# Patient Record
Sex: FEMALE | Race: WHITE
[De-identification: names, ages, dates, MRNs, and addresses within clinical notes are randomized per-mention and may not be internally consistent; named-entity substitution may affect disease eponyms.]

---

## 2017-03-18 ENCOUNTER — HOSPITAL ENCOUNTER (OUTPATIENT)
Dept: HOSPITAL 63 - ER | Age: 67
Setting detail: OBSERVATION
LOS: 3 days | Discharge: HOME | End: 2017-03-21
Attending: INTERNAL MEDICINE | Admitting: INTERNAL MEDICINE
Payer: COMMERCIAL

## 2017-03-18 VITALS — HEIGHT: 59 IN | WEIGHT: 168.31 LBS | BODY MASS INDEX: 33.93 KG/M2

## 2017-03-18 DIAGNOSIS — J44.0: ICD-10-CM

## 2017-03-18 DIAGNOSIS — Z82.49: ICD-10-CM

## 2017-03-18 DIAGNOSIS — J11.1: ICD-10-CM

## 2017-03-18 DIAGNOSIS — Z79.899: ICD-10-CM

## 2017-03-18 DIAGNOSIS — K21.9: ICD-10-CM

## 2017-03-18 DIAGNOSIS — E78.5: ICD-10-CM

## 2017-03-18 DIAGNOSIS — M15.9: ICD-10-CM

## 2017-03-18 DIAGNOSIS — J44.1: ICD-10-CM

## 2017-03-18 DIAGNOSIS — Z83.3: ICD-10-CM

## 2017-03-18 DIAGNOSIS — J45.909: ICD-10-CM

## 2017-03-18 DIAGNOSIS — I25.10: ICD-10-CM

## 2017-03-18 DIAGNOSIS — F41.9: ICD-10-CM

## 2017-03-18 DIAGNOSIS — I10: ICD-10-CM

## 2017-03-18 DIAGNOSIS — J09.X2: Primary | ICD-10-CM

## 2017-03-18 DIAGNOSIS — F17.210: ICD-10-CM

## 2017-03-18 LAB
ALBUMIN SERPL-MCNC: 3.8 G/DL (ref 3.4–5)
ALP SERPL-CCNC: 58 U/L (ref 46–116)
ALT SERPL-CCNC: 28 U/L (ref 14–59)
ANION GAP SERPL CALC-SCNC: 6 MMOL/L (ref 6–14)
AST SERPL-CCNC: 20 U/L (ref 15–37)
BASOPHILS # BLD AUTO: 0.1 X10^3/UL (ref 0–0.2)
BASOPHILS NFR BLD: 2 % (ref 0–3)
BILIRUB DIRECT SERPL-MCNC: 0.1 MG/DL (ref 0–0.2)
BILIRUB SERPL-MCNC: 0.3 MG/DL (ref 0.2–1)
CA-I SERPL ISE-MCNC: 12 MG/DL (ref 7–20)
CALCIUM SERPL-MCNC: 9 MG/DL (ref 8.5–10.1)
CHLORIDE SERPL-SCNC: 106 MMOL/L (ref 98–107)
CK SERPL-CCNC: 67 U/L (ref 26–192)
CO2 SERPL-SCNC: 29 MMOL/L (ref 21–32)
CREAT SERPL-MCNC: 0.9 MG/DL (ref 0.6–1)
EOSINOPHIL NFR BLD: 0.7 X10^3/UL (ref 0–0.7)
EOSINOPHIL NFR BLD: 12 % (ref 0–3)
ERYTHROCYTE [DISTWIDTH] IN BLOOD BY AUTOMATED COUNT: 15 % (ref 11.5–14.5)
GFR SERPLBLD BASED ON 1.73 SQ M-ARVRAT: 63 ML/MIN
GLUCOSE SERPL-MCNC: 91 MG/DL (ref 70–99)
HCT VFR BLD CALC: 43.6 % (ref 36–47)
HGB BLD-MCNC: 14.4 G/DL (ref 12–15.5)
LIPASE: 216 U/L (ref 73–393)
LYMPHOCYTES # BLD: 2.2 X10^3/UL (ref 1–4.8)
LYMPHOCYTES NFR BLD AUTO: 35 % (ref 24–48)
MAGNESIUM SERPL-MCNC: 2.1 MG/DL (ref 1.8–2.4)
MCH RBC QN AUTO: 29 PG (ref 25–35)
MCHC RBC AUTO-ENTMCNC: 33 G/DL (ref 31–37)
MCV RBC AUTO: 88 FL (ref 79–100)
MONO #: 0.5 X10^3/UL (ref 0–1.1)
MONOCYTES NFR BLD: 9 % (ref 0–9)
NEUT #: 2.6 X10^3UL (ref 1.8–7.7)
NEUTROPHILS NFR BLD AUTO: 42 % (ref 31–73)
PLATELET # BLD AUTO: 231 X10^3/UL (ref 140–400)
POTASSIUM SERPL-SCNC: 3.8 MMOL/L (ref 3.5–5.1)
PROT SERPL-MCNC: 6.8 G/DL (ref 6.4–8.2)
RBC # BLD AUTO: 4.96 X10^6/UL (ref 3.5–5.4)
SODIUM SERPL-SCNC: 141 MMOL/L (ref 136–145)
WBC # BLD AUTO: 6.1 X10^3/UL (ref 4–11)

## 2017-03-18 PROCEDURE — 87040 BLOOD CULTURE FOR BACTERIA: CPT

## 2017-03-18 PROCEDURE — 87070 CULTURE OTHR SPECIMN AEROBIC: CPT

## 2017-03-18 PROCEDURE — 83690 ASSAY OF LIPASE: CPT

## 2017-03-18 PROCEDURE — 85730 THROMBOPLASTIN TIME PARTIAL: CPT

## 2017-03-18 PROCEDURE — 82553 CREATINE MB FRACTION: CPT

## 2017-03-18 PROCEDURE — G0378 HOSPITAL OBSERVATION PER HR: HCPCS

## 2017-03-18 PROCEDURE — 96361 HYDRATE IV INFUSION ADD-ON: CPT

## 2017-03-18 PROCEDURE — 36415 COLL VENOUS BLD VENIPUNCTURE: CPT

## 2017-03-18 PROCEDURE — 71020: CPT

## 2017-03-18 PROCEDURE — G0238 OTH RESP PROC, INDIV: HCPCS

## 2017-03-18 PROCEDURE — 96365 THER/PROPH/DIAG IV INF INIT: CPT

## 2017-03-18 PROCEDURE — 84484 ASSAY OF TROPONIN QUANT: CPT

## 2017-03-18 PROCEDURE — 87086 URINE CULTURE/COLONY COUNT: CPT

## 2017-03-18 PROCEDURE — 96376 TX/PRO/DX INJ SAME DRUG ADON: CPT

## 2017-03-18 PROCEDURE — 85379 FIBRIN DEGRADATION QUANT: CPT

## 2017-03-18 PROCEDURE — 83735 ASSAY OF MAGNESIUM: CPT

## 2017-03-18 PROCEDURE — 85610 PROTHROMBIN TIME: CPT

## 2017-03-18 PROCEDURE — 94760 N-INVAS EAR/PLS OXIMETRY 1: CPT

## 2017-03-18 PROCEDURE — G0379 DIRECT REFER HOSPITAL OBSERV: HCPCS

## 2017-03-18 PROCEDURE — 87880 STREP A ASSAY W/OPTIC: CPT

## 2017-03-18 PROCEDURE — 85027 COMPLETE CBC AUTOMATED: CPT

## 2017-03-18 PROCEDURE — 96372 THER/PROPH/DIAG INJ SC/IM: CPT

## 2017-03-18 PROCEDURE — 80076 HEPATIC FUNCTION PANEL: CPT

## 2017-03-18 PROCEDURE — 80048 BASIC METABOLIC PNL TOTAL CA: CPT

## 2017-03-18 PROCEDURE — 87186 SC STD MICRODIL/AGAR DIL: CPT

## 2017-03-18 PROCEDURE — 81001 URINALYSIS AUTO W/SCOPE: CPT

## 2017-03-18 PROCEDURE — 87804 INFLUENZA ASSAY W/OPTIC: CPT

## 2017-03-18 PROCEDURE — 99285 EMERGENCY DEPT VISIT HI MDM: CPT

## 2017-03-18 PROCEDURE — 96366 THER/PROPH/DIAG IV INF ADDON: CPT

## 2017-03-18 PROCEDURE — 94640 AIRWAY INHALATION TREATMENT: CPT

## 2017-03-18 PROCEDURE — 93005 ELECTROCARDIOGRAM TRACING: CPT

## 2017-03-18 PROCEDURE — 96375 TX/PRO/DX INJ NEW DRUG ADDON: CPT

## 2017-03-18 PROCEDURE — 84443 ASSAY THYROID STIM HORMONE: CPT

## 2017-03-18 PROCEDURE — 80053 COMPREHEN METABOLIC PANEL: CPT

## 2017-03-18 NOTE — ED.ADGEN
Past History


Past Medical History:  Asthma, CAD, COPD, GERD


Past Surgical History:  Knee Replacement, Tonsillectomy, Tubal ligation


Smoking:  Quit Greater Than 1 Year


Alcohol Use:  None


Drug Use:  None





Adult General


Chief Complaint


Chief Complaint


".. I ve been short of breath...".. " I ve been sick the last 3 days.. fever,  

chills.. cough.. and now some yellow sputum.. I did go out to the bar last 

night... but now I am really short of breath..."





HPI


HPI





Patient is a 66 year old female who presents with above hx and complaints of 

increased dyspnea.  Found to be hypoxic 80 by paramedics.  Pt. normally not on 

oxygen.  Does have a history of cardiac disease and COPD. Patient no longer 

smokes. Patient did have stents placed in August and September for severe 

coronary artery stenosis. Patient has been seen both at [place] and  [facility] for her cardiac disease. Patient normally follows with Dr. Islas.. Patient denies any travel.  Pt. denies any specific ill contacts. 

Patient denies any noncompliance with her hypertensive  or other cardiac meds.





Review of Systems


Review of Systems





Constitutional:  Hx. of  fever or chills []


Eyes: Denies change in visual acuity, redness, or eye pain []


HENT: Denies nasal congestion or sore throat []


Respiratory:  hx of  cough  and  shortness of breath []


Cardiovascular: No additional information not addressed in HPI []


GI: Denies abdominal pain, nausea, vomiting, bloody stools or diarrhea []


: Denies dysuria or hematuria []


Musculoskeletal: Denies back pain or joint pain []


Integument: Denies rash or skin lesions []


Neurologic: Denies headache, focal weakness or sensory changes []


Endocrine: Denies polyuria or polydipsia []





Family History


Family History


Noncontributory





Current Medications


Current Medications





 Current Medications








 Medications


  (Trade)  Dose


 Ordered  Sig/Mar  Start Time


 Stop Time Status Last Admin


Dose Admin


 


 Albuterol/


 Ipratropium 3 ml  3 ml  1X  ONCE  3/18/17 22:30


 3/18/17 22:31 DC 3/18/17 22:47


3 ML


 


 Aspirin 324 mg  324 mg  1X  ONCE  3/18/17 22:30


 3/18/17 22:31 DC 3/19/17 00:46


324 MG


 


 Azithromycin


  (Zithromax)  500 mg  1X  ONCE  3/18/17 23:00


 3/18/17 23:01 DC 3/19/17 00:46


500 MG


 


 Ceftriaxone


 Sodium/Sodium


 Chloride


  (Rocephin/Iv


 Sodium Chloride


 0.9% 50ml)  50 ml @ 


 100 mls/hr  1X  ONCE  3/18/17 23:00


 3/18/17 23:29 DC  


 


 


 Enoxaparin Sodium


  (Lovenox 80mg


 Syringe)  80 mg  Q12HR  3/19/17 00:00


    3/19/17 00:45


80 MG


 


 Info


  (Anti-Coagulation


 Monitoring By


 Pharmacy)  1 each  PRN DAILY  PRN  3/18/17 23:45


     


 


 


 Lactated Ringer's


  (Iv Lactated


 Ringers)  1,000 ml @ 


 100 mls/hr  Q10H  3/18/17 22:30


    3/19/17 00:47


100 MLS/HR


 


 Ondansetron HCl


  (Zofran)  4 mg  PRN Q4HRS  PRN  3/19/17 00:00


 3/19/17 23:59  3/19/17 00:44


4 MG





See Nursing for home meds





Allergies


Allergies





 Allergies








Coded Allergies Type Severity Reaction Last Updated Verified


 


  venom-honey bee Allergy Severe Shortness of Air 5/29/14 Yes


 


  cimetidine Allergy Intermediate "toxic" 7/19/15 Yes


 


  cimetidine HCl Allergy Intermediate "toxic" 7/19/15 Yes











Physical Exam


Physical Exam





Constitutional: Moderate distress, non-toxic appearance. []


HENT: Normocephalic, atraumatic, bilateral external ears normal, oropharynx 

moist, injected pharynx, , no oral exudates, nose rhinorrhea. 


Eyes: PERRLA, EOMI, conjunctiva normal, no discharge. [] 


Neck: Normal range of motion, no tenderness, supple, no stridor. [] 


Cardiovascular:bradycardia heart rate regular rhythm, no murmur , PMI to lt. 


Lungs & Thorax:  Bilateral breath sounds equal at apexes with scattered wheezes 

on  auscultation []


Abdomen: Bowel sounds normal, soft, no tenderness, no masses, no pulsatile 

masses.  Obese.  Old surgical scar. 


Skin: Warm, dry, no erythema, no rash. [] 


Back: No tenderness, no CVA tenderness. [] 


Extremities: No tenderness, no cyanosis, no clubbing, ROM intact, trace ankle 

edema.  No cording.


Neurologic: Alert and oriented X 3, normal motor function, normal sensory 

function, no focal deficits noted. []


Psychologic: Affect anxious,  judgement normal, mood normal. []





Current Patient Data


Vital Signs





 Vital Signs








  Date Time  Temp Pulse Resp B/P Pulse Ox O2 Delivery O2 Flow Rate FiO2


 


3/18/17 22:48     99 Nasal Cannula 3.0 


 


3/18/17 22:14  73 22     


 


3/18/17 22:05 97.8   129/74    








Lab Results





 Laboratory Tests








Test


  3/18/17


22:35 3/18/17


23:25


 


White Blood Count


  6.1x10^3/uL


(4.0-11.0) 


 


 


Red Blood Count


  4.96x10^6/uL


(3.50-5.40) 


 


 


Hemoglobin


  14.4g/dL


(12.0-15.5) 


 


 


Hematocrit


  43.6%


(36.0-47.0) 


 


 


Mean Corpuscular Volume 88fL ()   


 


Mean Corpuscular Hemoglobin 29pg (25-35)   


 


Mean Corpuscular Hemoglobin


Concent 33g/dL (31-37)


  


 


 


Red Cell Distribution Width


  15.0%


(11.5-14.5)  H 


 


 


Platelet Count


  231x10^3/uL


(140-400) 


 


 


Neutrophils (%) (Auto) 42% (31-73)   


 


Lymphocytes (%) (Auto) 35% (24-48)   


 


Monocytes (%) (Auto) 9% (0-9)   


 


Eosinophils (%) (Auto) 12% (0-3)  H 


 


Basophils (%) (Auto) 2% (0-3)   


 


Neutrophils # (Auto)


  2.6x10^3uL


(1.8-7.7) 


 


 


Lymphocytes # (Auto)


  2.2x10^3/uL


(1.0-4.8) 


 


 


Monocytes # (Auto)


  0.5x10^3/uL


(0.0-1.1) 


 


 


Eosinophils # (Auto)


  0.7x10^3/uL


(0.0-0.7) 


 


 


Basophils # (Auto)


  0.1x10^3/uL


(0.0-0.2) 


 


 


Prothrombin Time


  10.3SEC


(9.4-11.4) 


 


 


Prothrombin Time INR 1.0 (0.9-1.1)   


 


PTT 24SEC (23-33)   


 


D-Dimer (Yessenia)


  0.43mg/L


(0.00-0.50) 


 


 


Sodium Level


  141mmol/L


(136-145) 


 


 


Potassium Level


  3.8mmol/L


(3.5-5.1) 


 


 


Chloride Level


  106mmol/L


() 


 


 


Carbon Dioxide Level


  29mmol/L


(21-32) 


 


 


Anion Gap 6 (6-14)   


 


Blood Urea Nitrogen


  12mg/dL (7-20)


  


 


 


Creatinine


  0.9mg/dL


(0.6-1.0) 


 


 


Estimated GFR


(Cockcroft-Gault) 63.0  


  


 


 


Glucose Level


  91mg/dL


(70-99) 


 


 


Calcium Level


  9.0mg/dL


(8.5-10.1) 


 


 


Magnesium Level


  2.1mg/dL


(1.8-2.4) 


 


 


Total Bilirubin


  0.3mg/dL


(0.2-1.0) 


 


 


Direct Bilirubin


  0.1mg/dL


(0.0-0.2) 


 


 


Aspartate Amino Transferase


(AST) 20U/L (15-37)  


  


 


 


Alanine Aminotransferase (ALT) 28U/L (14-59)   


 


Alkaline Phosphatase


  58U/L ()


  


 


 


Creatine Kinase


  67U/L ()


  


 


 


Creatine Kinase MB (Mass)


  0.8ng/mL


(0.0-3.6) 


 


 


Creatine Kinase MB Relative


Index 1.2% (0-4)  


  


 


 


Troponin I Quantitative


  < 0.017ng/mL


(0-0.055) 


 


 


Total Protein


  6.8g/dL


(6.4-8.2) 


 


 


Albumin


  3.8g/dL


(3.4-5.0) 


 


 


Lipase


  216U/L


() 


 


 


Urine Opiates Screen  Pos (NEG)  


 


Urine Methadone Screen  Neg (NEG)  


 


Urine Barbiturates  Neg (NEG)  


 


Urine Phencyclidine Screen  Neg (NEG)  


 


Urine


Amphetamine/Methamphetamine 


  Neg (NEG)  


 


 


Urine Benzodiazepines Screen  Neg (NEG)  


 


Urine Cocaine Screen  Neg (NEG)  


 


Urine Cannabinoids Screen  Neg (NEG)  


 


Urine Ethyl Alcohol  Neg (NEG)  


 


Group A Streptococcus Rapid


  


  Negative


(NEGATIVE)











EKG


EKG


My interpretation of EKG shows a rate of 66 with a Lt axis, and intra 

ventricular block. []





Radiology/Procedures


Radiology/Procedures


My interpretation of CXR shows chronic changes COPD in appearance.. Somewhat 

patchy in appearance. No large consolidation []





Course & Med Decision Making


Course & Med Decision Making


Pertinent Labs and Imaging studies reviewed. (See chart for details).  

Discussed presentation, testing and tx. plan with Dr. Little.  Will admit for 

further eval. and tx.





[]





Final Impression


Final Impression


1. Dyspnea[]


2. Respiratory Failure- Hypoxia


3. Hx of BBBlock-


4. Hx. CADz- Stents in Aug. and Sept. 2016. 


5. Elevated eosinophils


6. COPD exacerbation


Problems:  





Dragon Disclaimer


Dragon Disclaimer


This electronic medical record was generated, in whole or in part, using a 

voice recognition dictation system.








JESÚS LEIGH MD Mar 18, 2017 22:05

## 2017-03-19 VITALS — DIASTOLIC BLOOD PRESSURE: 56 MMHG | SYSTOLIC BLOOD PRESSURE: 104 MMHG

## 2017-03-19 VITALS — SYSTOLIC BLOOD PRESSURE: 99 MMHG | DIASTOLIC BLOOD PRESSURE: 66 MMHG

## 2017-03-19 VITALS — DIASTOLIC BLOOD PRESSURE: 61 MMHG | SYSTOLIC BLOOD PRESSURE: 117 MMHG

## 2017-03-19 LAB
ALBUMIN SERPL-MCNC: 3.6 G/DL (ref 3.4–5)
ALBUMIN/GLOB SERPL: 1.2 {RATIO} (ref 1–1.7)
ALP SERPL-CCNC: 51 U/L (ref 46–116)
ALT SERPL-CCNC: 25 U/L (ref 14–59)
AMPHETAMINE/METHAMPHETAMINE: (no result)
ANION GAP SERPL CALC-SCNC: 10 MMOL/L (ref 6–14)
APTT PPP: YELLOW S
AST SERPL-CCNC: 17 U/L (ref 15–37)
BACTERIA #/AREA URNS HPF: (no result) /HPF
BARBITURATES UR-MCNC: (no result) UG/ML
BASOPHILS # BLD AUTO: 0 X10^3/UL (ref 0–0.2)
BASOPHILS NFR BLD: 0 % (ref 0–3)
BENZODIAZ UR-MCNC: (no result) UG/L
BILIRUB SERPL-MCNC: 0.2 MG/DL (ref 0.2–1)
BILIRUB UR QL STRIP: (no result)
BUN/CREAT SERPL: 10 (ref 6–20)
CA-I SERPL ISE-MCNC: 12 MG/DL (ref 7–20)
CALCIUM SERPL-MCNC: 8.9 MG/DL (ref 8.5–10.1)
CANNABINOIDS UR-MCNC: (no result) UG/L
CHLORIDE SERPL-SCNC: 106 MMOL/L (ref 98–107)
CO2 SERPL-SCNC: 26 MMOL/L (ref 21–32)
COCAINE UR-MCNC: (no result) NG/ML
CREAT SERPL-MCNC: 1.2 MG/DL (ref 0.6–1)
EOSINOPHIL NFR BLD: 0 X10^3/UL (ref 0–0.7)
EOSINOPHIL NFR BLD: 1 % (ref 0–3)
ERYTHROCYTE [DISTWIDTH] IN BLOOD BY AUTOMATED COUNT: 15.1 % (ref 11.5–14.5)
FIBRINOGEN PPP-MCNC: (no result) MG/DL
GFR SERPLBLD BASED ON 1.73 SQ M-ARVRAT: 45.2 ML/MIN
GLOBULIN SER-MCNC: 3.1 G/DL (ref 2.2–3.8)
GLUCOSE SERPL-MCNC: 200 MG/DL (ref 70–99)
GLUCOSE UR STRIP-MCNC: (no result) MG/DL
HCT VFR BLD CALC: 43.9 % (ref 36–47)
HGB BLD-MCNC: 14.4 G/DL (ref 12–15.5)
INFLUENZA A PATIENT: POSITIVE
INFLUENZA B PATIENT: POSITIVE
LYMPHOCYTES # BLD: 0.9 X10^3/UL (ref 1–4.8)
LYMPHOCYTES NFR BLD AUTO: 15 % (ref 24–48)
MCH RBC QN AUTO: 29 PG (ref 25–35)
MCHC RBC AUTO-ENTMCNC: 33 G/DL (ref 31–37)
MCV RBC AUTO: 88 FL (ref 79–100)
METHADONE SERPL-MCNC: (no result) NG/ML
MONO #: 0.1 X10^3/UL (ref 0–1.1)
MONOCYTES NFR BLD: 1 % (ref 0–9)
NEUT #: 5 X10^3UL (ref 1.8–7.7)
NEUTROPHILS NFR BLD AUTO: 83 % (ref 31–73)
NITRITE UR QL STRIP: (no result)
OPIATES UR-MCNC: (no result) NG/ML
PCP SERPL-MCNC: (no result) MG/DL
PLATELET # BLD AUTO: 224 X10^3/UL (ref 140–400)
POTASSIUM SERPL-SCNC: 3.4 MMOL/L (ref 3.5–5.1)
PROT SERPL-MCNC: 6.7 G/DL (ref 6.4–8.2)
RBC # BLD AUTO: 4.99 X10^6/UL (ref 3.5–5.4)
RBC #/AREA URNS HPF: (no result) /HPF (ref 0–2)
SODIUM SERPL-SCNC: 142 MMOL/L (ref 136–145)
SP GR UR STRIP: >=1.03
SQUAMOUS #/AREA URNS LPF: (no result) /LPF
UROBILINOGEN UR-MCNC: 0.2 MG/DL
WBC # BLD AUTO: 6 X10^3/UL (ref 4–11)
WBC #/AREA URNS HPF: (no result) /HPF (ref 0–4)

## 2017-03-19 RX ADMIN — ENOXAPARIN SODIUM SCH MG: 100 INJECTION SUBCUTANEOUS at 00:45

## 2017-03-19 RX ADMIN — CLOPIDOGREL BISULFATE SCH MG: 75 TABLET ORAL at 08:16

## 2017-03-19 RX ADMIN — ASPIRIN SCH MG: 81 TABLET, COATED ORAL at 08:15

## 2017-03-19 RX ADMIN — ATORVASTATIN CALCIUM SCH MG: 20 TABLET, FILM COATED ORAL at 20:45

## 2017-03-19 RX ADMIN — HYDROCODONE BITARTRATE AND ACETAMINOPHEN PRN TAB: 7.5; 325 TABLET ORAL at 17:01

## 2017-03-19 RX ADMIN — ONDANSETRON PRN MG: 2 INJECTION, SOLUTION INTRAMUSCULAR; INTRAVENOUS at 19:02

## 2017-03-19 RX ADMIN — PANTOPRAZOLE SODIUM SCH MG: 40 TABLET, DELAYED RELEASE ORAL at 08:15

## 2017-03-19 RX ADMIN — CEFTRIAXONE SODIUM SCH MLS/HR: 1 INJECTION, POWDER, FOR SOLUTION INTRAMUSCULAR; INTRAVENOUS at 02:00

## 2017-03-19 RX ADMIN — HYDROCODONE BITARTRATE AND ACETAMINOPHEN PRN TAB: 7.5; 325 TABLET ORAL at 09:15

## 2017-03-19 RX ADMIN — OSELTAMIVIR PHOSPHATE SCH MG: 75 CAPSULE ORAL at 08:15

## 2017-03-19 RX ADMIN — IPRATROPIUM BROMIDE AND ALBUTEROL SULFATE SCH ML: .5; 3 SOLUTION RESPIRATORY (INHALATION) at 15:59

## 2017-03-19 RX ADMIN — METOPROLOL TARTRATE SCH MG: 25 TABLET, FILM COATED ORAL at 08:16

## 2017-03-19 RX ADMIN — IPRATROPIUM BROMIDE AND ALBUTEROL SULFATE SCH ML: .5; 3 SOLUTION RESPIRATORY (INHALATION) at 20:40

## 2017-03-19 RX ADMIN — HYDROCODONE BITARTRATE AND ACETAMINOPHEN PRN TAB: 7.5; 325 TABLET ORAL at 12:55

## 2017-03-19 RX ADMIN — IPRATROPIUM BROMIDE AND ALBUTEROL SULFATE SCH ML: .5; 3 SOLUTION RESPIRATORY (INHALATION) at 12:00

## 2017-03-19 RX ADMIN — POTASSIUM CHLORIDE SCH MEQ: 1500 TABLET, EXTENDED RELEASE ORAL at 14:45

## 2017-03-19 RX ADMIN — METHYLPREDNISOLONE SODIUM SUCCINATE SCH MG: 125 INJECTION, POWDER, FOR SOLUTION INTRAMUSCULAR; INTRAVENOUS at 08:17

## 2017-03-19 RX ADMIN — AZITHROMYCIN SCH MG: 250 TABLET, FILM COATED ORAL at 08:15

## 2017-03-19 RX ADMIN — OSELTAMIVIR PHOSPHATE SCH MG: 75 CAPSULE ORAL at 20:45

## 2017-03-19 RX ADMIN — ENOXAPARIN SODIUM SCH MG: 100 INJECTION SUBCUTANEOUS at 08:18

## 2017-03-19 RX ADMIN — POTASSIUM CHLORIDE SCH MEQ: 1500 TABLET, EXTENDED RELEASE ORAL at 20:45

## 2017-03-19 RX ADMIN — CETIRIZINE HYDROCHLORIDE SCH MG: 10 TABLET, FILM COATED ORAL at 08:16

## 2017-03-19 RX ADMIN — ONDANSETRON PRN MG: 2 INJECTION, SOLUTION INTRAMUSCULAR; INTRAVENOUS at 00:44

## 2017-03-19 NOTE — PDOC
PROVIDER NOTE


CARDIOLOGY CONSULT NOTE





HPI


Patient is a 66 y.o woman presenting with shortness of breath. Has a history of 

CAD but denies any chest pain. She has had multiple prior admissions for COPD 

exacerbation. Currently with Influenza a/b positivity on serology (rare but 

possible). Denies any syncope or palpitations. 





PAST MEDICAL HISTORY


Cardiovascular:  CAD (stent to unknown target 5/2016; remains on DAPT), HTN, 

Syncope


Pulmonary:  Asthma, COPD, Pneumonia


CENTRAL NERVOUS SYSTEM:  Other (none)


GI:  GERD, Other (esophageal stricture which needs repeat dilatation but can 

not be completed due to DAPT)


Heme/Onc:  No pertinent hx


Hepatobiliary:  Hep A/B/C (? of hepatitis B in her 20s)


Psych:  Anxiety


Musculoskeletal:  Osteoarthritis


Rheumatologic:  No pertinent hx


Infectious disease:  No pertinent hx


ENT:  Allergic Rhinitis


Renal/:  No pertinent hx ( ), Other (endometriosis)


Endocrine:  No pertinent hx


Dermatology:  No pertinent hx





PAST SURGICAL HISTORY


Past Surgical History:  Total knee replacement (right), Tonsillectomy, Other (

left wrist compound fracture with surgical repair; [facility], 5/2016)





FAMILY HISTORY


Family History:  Coronary Artery Disease (mother with CABG; half sister with 

heart disease - details unclear), Diabetes (maternal grandmother), Other (

father - cerebral aneurysm; sister with bipolar disorder)





SOCIAL HISTORY


Smoke:  No


ALCOHOL:  occassional


Drugs:  None


Lives:  Friends (significant other)





MEDS: 


Atorvastatin


Plavix


ASA 


Metoprolol 12.5mg bid





PHYSICAL EXAM: 


VSS


Gen: 


CVS: RRR, no m/r/g


LUNGS: Rhonchi bilaterally


ABD: Soft, NT/ND +BS


EXT: No edema


NEURO:No focal deficits


MSK: No trauma





Labs reviewed: 


Trop negative x 3


Echo in 7/2016 wnl


Nuc 7/2016 wnl. 





Impression: 


1. COPD exacerbation


2. Known CAD but stable





RECS: 


1. Supportive care from CV standpoint


2. Continue current medical therapy


3. Outpt f/u with primary cardiology for further eval as needed.








GIBSON JORDAN MD Mar 19, 2017 10:41

## 2017-03-19 NOTE — ACF
Admission Criteria Forms


                                                           COPD





Clinical Indications for Admission to Inpatient Care


                                                                                

(Place 'X' for any and all applicable criteria):





Admission is indicated for ANY ONE of the following (1)(2)(3):


[ ]I.    Acute exacerbation by high-risk comorbidity (e.g., pneumonia, 

dysrhythmia, heart failure, pleural effusion, 


        pneumothorax) or severe underlying COPD (e.g., steroid dependent)


[ ]II.   Inpatient admission required rather than observation care (see Chronic 

Obstructive Pulmonary


        Disease: Observation Care) because of ANY ONE of the following:


        [ ]a)   New or pre-existing signs or symptoms of COPD (eg, dyspnea or 

Tachypnea at rest or with


                 minimal activity) that persist despite outpatient and 

observation care treatment


        [ ]b)   New-onset hypoxemia (room air SaO2 less than 90%, PO2 less than 

60 mm Hg (8.0 kPa))


                 that persists despite outpatient and observation care treatment


        [ ]c)   Worsening of pre-existing hypoxemia (eg, new or increased 

requirement for supplemental


                 oxygen to maintain oxygenation at baseline level) that 

persists despite outpatient and


                 observation care treatment, with oxygen treatment needs 

performable only in acute inpatient setting


        [ ]d)   Hypercarbia (PCO2 greater than 40 mm Hg (5.3 kPa))-induced 

respiratory acidosis (pH less


                 than 7.35) that persists despite outpatient and observation 

care treatment


        [ ]e)   Supplemental oxygen or respiratory treatments for over 24 hours 

that are performable only in acute inpatient setting


        [ ]f)    Chest tube placement with active evacuation (e.g., suction, 

drainage) (5)


        [ ]g)   Other condition, treatment or monitoring requiring inpatient 

admission


[ ]III.  Planned invasive surgical or diagnostic procedures requiring acute-

care hospitalization 


[X]IV. Acute respiratory failure (e.g., uncompensated hypercarbia, severe 

hypoxemia) 


[ ]V.  Severe comorbid condition (e.g., severe steroid myopathy, acute 

vertebral fracture) that has acutely worsened pulmonary function


[ ]VI. Confusion state, lethargy, obtundation, stupor or coma











Extended stay beyond goal length of stay may be needed for (31)(32):


[ ]a ) Respiratory Failure.


[ ]b) Severe or persisting hypoxemia or hypercarbia


[ ]c) Severe or persistent dyspnea


[ ]d) Comorbidities (e.g. chronic heart failure, atrial fibrillation with rapid 

response, pneumonia)


[ ]e) Malnutrition








The original [facility] content created by [facility] 

[facility] has been revised. 


The portions of the content which have been revised are identified through the 

use of italic text or in bold, and [facility] 


has neither reviewed nor approved the  modified material. All other unmodified 

content is copyright   [facility].





Please see references footnoted in the original [facility] edition 

2016


Admission Criteria Met?:  Yes








MARIANA MACKAY Mar 19, 2017 06:54

## 2017-03-19 NOTE — RAD
Indication: Dyspnea today.



Technique: Two-view chest radiograph was obtained and compared to a study from

January 23, 2017.



Findings: Minimal atelectasis or scarring is noted in the left lung base. The

lungs otherwise are clear. Irregularity at the first costochondral junction is

greater on the left, similar in appearance to prior. This has a similar

appearance compared to a January 14, 2016 study as well. The heart is not

enlarged and there is no heart failure. There is atheromatous disease in the

thoracic aorta. There is no pleural effusion. Bony structures are intact.



Impression: Minimal atelectasis or scarring in the left lung base.

## 2017-03-19 NOTE — HP
ADMIT DATE:  2017



HISTORY OF PRESENT ILLNESS:  The patient is a 66-year-old  female

patient, who came to the Emergency Room complaining of shortness of breath,

having been sick for the last 3 days.  She has also fever, chills, and cough

with some yellowish sputum.  She was evaluated in the Emergency Room and was

found to be positive for both influenza A and B and her chest x-ray showed that

there is minimal atelectasis or scarring in the left lung base and basically she

was admitted with flu and COPD exacerbation and was started on IV antibiotic in

the form of ceftriaxone and Zithromax as well as Tamiflu.



PAST MEDICAL HISTORY:  Significant for gastroesophageal reflux disease,

hyperlipidemia, chronic obstructive pulmonary disease, generalized

osteoarthritis, gastritis and gastric ulcers.



PAST SURGICAL HISTORY:  Significant for left wrist fracture, status post open

reduction and internal fixation, right total knee arthroplasty, tubal ligation

and tonsillectomy.



ALLERGIES:  She is allergic to TAGAMET and BEE VENOM.



FAMILY HISTORY:  She has 2 half-brothers and 4 half-sisters.  Her biological

father  because of ruptured cerebral aneurysm at the edge of 80 and her

mother is still alive at age of 83.  She is known to have coronary artery

disease with 5 stents in her heart.



SOCIAL HISTORY:  She is , has 2 daughters.  She quit smoking about 9

years ago.  She used to smoke half to 1 pack a day and smoked for about 24

years.  She does not drink alcohol or use recreational drugs.  She is currently

retired.



REVIEW OF SYSTEMS:  The patient denied any blurring of vision, cataract,

glaucoma or macular degeneration.  Denied any earache, tinnitus or sensorineural

deafness.  Denied any stuffy nose, nosebleed or postnasal drip.  Denied any sore

throat, sore tongue, toothache, hoarseness of voice or difficulty swallowing. 

Denied any nausea, vomiting, diarrhea or constipation.  Denied any hematemesis,

melena or hematochezia.  Denied any dysuria, frequency, and hematuria.  Did

complain of shortness of breath, cough with yellowish sputum as well as fevers

and chills.



MEDICATIONS:  She is currently on the following home medications.  She is on

albuterol sulfate 2 puffs every 4 hours, aspirin 81 mg once a day, atorvastatin

calcium 10 mg at bedtime, cetirizine 10 mg, Plavix 75 mg once a day, epinephrine

as needed for anaphylactic shock, Flonase 2 sprays to each nostril once a day,

Advair Diskus 250/50 one puff twice a day, hydrocodone/APAP 7.5/325 one tablet

every 8 hours.  She is on DuoNeb 4 times a day, lorazepam 0.5 mg every 6 hours,

metoprolol tartrate 25 mg half tablet once a day, Protonix 40 mg once a day, and

prednisone 20 mg twice a day.



PHYSICAL EXAMINATION:

GENERAL:  On arrival to the Emergency Room, the patient was clearly tachypneic,

pale, but no jaundice, cyanosis or thyromegaly.  No jugular venous distention. 

No limb edema.

VITAL SIGNS:  Her heart rate was 78, blood pressure was 129/74, temperature was

97.8, respiratory rate was 24, and oxygen saturation was 90% on room air.

HEAD, EYES, EARS, NOSE, AND THROAT:  Showed normocephalic, atraumatic.

NECK:  Supple.

HEART:  Showed normal first and second heart sounds with no gallop, rub or

murmur.

CHEST:  Clear to auscultation.  No crepitation or rhonchi.  There were some

scattered wheezes, but no crackles.

ABDOMEN:  Slightly distended, soft, and nontender.  No guarding or rigidity.  No

organomegaly.  All hernial orifices are intact.  Bowel sounds are normal.

NEUROLOGIC:  She was awake, alert, responding appropriately.  All cranial nerves

are intact.

EXTREMITIES:  She moves her extremities without difficulty.  She ambulates

without assistance or assistive devices.



LABORATORY DATA:  While in the Emergency Room, she has had lab work done, showed

that her white cell count was 6100, hemoglobin 14.4, hematocrit 44, MCV was 88

and platelet count 231,000.  Her chemistry showed a serum sodium 141, potassium

3.8, chloride 106, bicarbonate 29, anion gap of 6, BUN 12, creatinine 0.9,

estimated GFR was 63 mL per minute.  Her glucose was 91, calcium was 9,

magnesium 2.1.  Total bilirubin, AST, ALT, and alkaline phosphatase were normal.

 Her total protein was 6.8, albumin was 3.8.  Serum lipase was 216.  Her

prothrombin time was 10.3, INR of 1, aPTT was 24.  D-dimer was 0.43.  Urinalysis

was unremarkable.  Urine toxicology screen was positive for opiates and she

tested positive for influenza A and B and was negative for group A streptococcus

rapid test.



IMAGING:  Her chest x-ray was unremarkable and showed only minimal atelectasis

or scarring in the left lung base.



ASSESSMENT AND PLAN:  This is a 66-year-old  female patient, who came

in with shortness of breath, cough with yellowish sputum.  She was diagnosed

with influenza A and B, acute bronchitis and chronic obstructive pulmonary

disease exacerbation.  We will continue with IV ceftriaxone and Zithromax as

well as Tamiflu.  Continue with steroids and inhalers.  Continue with all other

medications.  Follow her closely and decide on further management accordingly.





______________________________

ROSA HAYNES MD



DR:  FLORY/greg  JOB#:  [id number] / [id number]

DD:  2017 13:09  DT:  2017 14:11

## 2017-03-20 VITALS — DIASTOLIC BLOOD PRESSURE: 60 MMHG | SYSTOLIC BLOOD PRESSURE: 112 MMHG

## 2017-03-20 VITALS — DIASTOLIC BLOOD PRESSURE: 61 MMHG | SYSTOLIC BLOOD PRESSURE: 112 MMHG

## 2017-03-20 VITALS — DIASTOLIC BLOOD PRESSURE: 52 MMHG | SYSTOLIC BLOOD PRESSURE: 108 MMHG

## 2017-03-20 LAB
ANION GAP SERPL CALC-SCNC: 9 MMOL/L (ref 6–14)
BASOPHILS # BLD AUTO: 0 X10^3/UL (ref 0–0.2)
BASOPHILS NFR BLD: 0 % (ref 0–3)
CA-I SERPL ISE-MCNC: 15 MG/DL (ref 7–20)
CALCIUM SERPL-MCNC: 8.5 MG/DL (ref 8.5–10.1)
CHLORIDE SERPL-SCNC: 110 MMOL/L (ref 98–107)
CO2 SERPL-SCNC: 26 MMOL/L (ref 21–32)
CREAT SERPL-MCNC: 1 MG/DL (ref 0.6–1)
EOSINOPHIL NFR BLD: 0 % (ref 0–3)
EOSINOPHIL NFR BLD: 0 X10^3/UL (ref 0–0.7)
ERYTHROCYTE [DISTWIDTH] IN BLOOD BY AUTOMATED COUNT: 15.2 % (ref 11.5–14.5)
GFR SERPLBLD BASED ON 1.73 SQ M-ARVRAT: 55.8 ML/MIN
GLUCOSE SERPL-MCNC: 139 MG/DL (ref 70–99)
HCT VFR BLD CALC: 38.3 % (ref 36–47)
HGB BLD-MCNC: 12.6 G/DL (ref 12–15.5)
LYMPHOCYTES # BLD: 2 X10^3/UL (ref 1–4.8)
LYMPHOCYTES NFR BLD AUTO: 15 % (ref 24–48)
MCH RBC QN AUTO: 29 PG (ref 25–35)
MCHC RBC AUTO-ENTMCNC: 33 G/DL (ref 31–37)
MCV RBC AUTO: 88 FL (ref 79–100)
MONO #: 1 X10^3/UL (ref 0–1.1)
MONOCYTES NFR BLD: 8 % (ref 0–9)
NEUT #: 10.1 X10^3UL (ref 1.8–7.7)
NEUTROPHILS NFR BLD AUTO: 77 % (ref 31–73)
PLATELET # BLD AUTO: 217 X10^3/UL (ref 140–400)
POTASSIUM SERPL-SCNC: 4.6 MMOL/L (ref 3.5–5.1)
RBC # BLD AUTO: 4.37 X10^6/UL (ref 3.5–5.4)
SODIUM SERPL-SCNC: 145 MMOL/L (ref 136–145)
WBC # BLD AUTO: 13.1 X10^3/UL (ref 4–11)

## 2017-03-20 RX ADMIN — CETIRIZINE HYDROCHLORIDE SCH MG: 10 TABLET, FILM COATED ORAL at 09:46

## 2017-03-20 RX ADMIN — CLOPIDOGREL BISULFATE SCH MG: 75 TABLET ORAL at 09:44

## 2017-03-20 RX ADMIN — OSELTAMIVIR PHOSPHATE SCH MG: 75 CAPSULE ORAL at 09:44

## 2017-03-20 RX ADMIN — POTASSIUM CHLORIDE SCH MEQ: 1500 TABLET, EXTENDED RELEASE ORAL at 13:46

## 2017-03-20 RX ADMIN — POTASSIUM CHLORIDE SCH MEQ: 1500 TABLET, EXTENDED RELEASE ORAL at 09:44

## 2017-03-20 RX ADMIN — HYDROCODONE BITARTRATE AND ACETAMINOPHEN PRN TAB: 7.5; 325 TABLET ORAL at 06:13

## 2017-03-20 RX ADMIN — IPRATROPIUM BROMIDE AND ALBUTEROL SULFATE SCH ML: .5; 3 SOLUTION RESPIRATORY (INHALATION) at 10:52

## 2017-03-20 RX ADMIN — HYDROCODONE BITARTRATE AND ACETAMINOPHEN PRN TAB: 7.5; 325 TABLET ORAL at 11:03

## 2017-03-20 RX ADMIN — FLUTICASONE PROPIONATE SCH SPRAY: 50 SPRAY, METERED NASAL at 09:00

## 2017-03-20 RX ADMIN — IPRATROPIUM BROMIDE AND ALBUTEROL SULFATE SCH ML: .5; 3 SOLUTION RESPIRATORY (INHALATION) at 19:21

## 2017-03-20 RX ADMIN — ATORVASTATIN CALCIUM SCH MG: 20 TABLET, FILM COATED ORAL at 20:53

## 2017-03-20 RX ADMIN — METHYLPREDNISOLONE SODIUM SUCCINATE SCH MG: 125 INJECTION, POWDER, FOR SOLUTION INTRAMUSCULAR; INTRAVENOUS at 09:44

## 2017-03-20 RX ADMIN — CEFTRIAXONE SODIUM SCH MLS/HR: 1 INJECTION, POWDER, FOR SOLUTION INTRAMUSCULAR; INTRAVENOUS at 20:53

## 2017-03-20 RX ADMIN — OSELTAMIVIR PHOSPHATE SCH MG: 30 CAPSULE ORAL at 20:53

## 2017-03-20 RX ADMIN — METOPROLOL TARTRATE SCH MG: 25 TABLET, FILM COATED ORAL at 10:02

## 2017-03-20 RX ADMIN — AZITHROMYCIN SCH MG: 250 TABLET, FILM COATED ORAL at 09:44

## 2017-03-20 RX ADMIN — ENOXAPARIN SODIUM SCH MG: 100 INJECTION SUBCUTANEOUS at 09:47

## 2017-03-20 RX ADMIN — IPRATROPIUM BROMIDE AND ALBUTEROL SULFATE SCH ML: .5; 3 SOLUTION RESPIRATORY (INHALATION) at 16:00

## 2017-03-20 RX ADMIN — PANTOPRAZOLE SODIUM SCH MG: 40 TABLET, DELAYED RELEASE ORAL at 05:48

## 2017-03-20 RX ADMIN — ASPIRIN SCH MG: 81 TABLET, COATED ORAL at 09:44

## 2017-03-20 RX ADMIN — IPRATROPIUM BROMIDE AND ALBUTEROL SULFATE SCH ML: .5; 3 SOLUTION RESPIRATORY (INHALATION) at 05:58

## 2017-03-20 RX ADMIN — HYDROCODONE BITARTRATE AND ACETAMINOPHEN PRN TAB: 7.5; 325 TABLET ORAL at 16:07

## 2017-03-20 RX ADMIN — POTASSIUM CHLORIDE SCH MEQ: 1500 TABLET, EXTENDED RELEASE ORAL at 20:53

## 2017-03-20 NOTE — PN
DATE:  03/20/2017



PROBLEMS:

1. Influenza type B.

2. Dyspnea.

3. History of coronary artery disease.

4. COPD exacerbation.

5. Anxiety.



SUBJECTIVE:  A 66-year-old female who states that she had gone into bar that was

a private club and so was smoking there.  Since she had a poor sense of smile

she did not realize it until she had been there for 3 hours when she noticed

someone smoking.  She has not feel well since then and tested positive for both

influenza type A and B.  She is on antibiotics, Tamiflu, breathing treatments,

and steroids.  She is complaining of a little left-sided rib pain.



OBJECTIVE:  

VITAL SIGNS:  Blood pressure 108/52, pulse is 93, respirations 20, pulse ox is

97% on room air, height 59 inches, and weight 163.3 pounds.

GENERAL:  She is resting comfortably in bed.

HEENT:  Her throat was clear.  She is edentulous.

NECK:  Supple.

LUNGS:  With a few scattered wheezes very few, moving air well.

CARDIOVASCULAR:  Regular rhythm and rate.

ABDOMEN:  Soft and nontender.

EXTREMITIES:  Without edema.  She does have some left-sided rib pain and did

hear her coughing earlier.



PLAN:  Decrease steroids and start planning for discharge possible tomorrow .





______________________________

NEELA YANEZ DO



DR:  SILVIA/greg  JOB#:  [id number] / [id number]

DD:  03/20/2017 14:18  DT:  03/20/2017 22:44

## 2017-03-21 VITALS — SYSTOLIC BLOOD PRESSURE: 119 MMHG | DIASTOLIC BLOOD PRESSURE: 68 MMHG

## 2017-03-21 VITALS — DIASTOLIC BLOOD PRESSURE: 66 MMHG | SYSTOLIC BLOOD PRESSURE: 106 MMHG

## 2017-03-21 VITALS — DIASTOLIC BLOOD PRESSURE: 62 MMHG | SYSTOLIC BLOOD PRESSURE: 115 MMHG

## 2017-03-21 LAB
ALBUMIN SERPL-MCNC: 3.4 G/DL (ref 3.4–5)
ALBUMIN/GLOB SERPL: 1.2 {RATIO} (ref 1–1.7)
ALP SERPL-CCNC: 45 U/L (ref 46–116)
ALT SERPL-CCNC: 19 U/L (ref 14–59)
ANION GAP SERPL CALC-SCNC: 10 MMOL/L (ref 6–14)
AST SERPL-CCNC: 12 U/L (ref 15–37)
BASOPHILS # BLD AUTO: 0 X10^3/UL (ref 0–0.2)
BASOPHILS NFR BLD: 0 % (ref 0–3)
BILIRUB SERPL-MCNC: 0.2 MG/DL (ref 0.2–1)
BUN/CREAT SERPL: 9 (ref 6–20)
CA-I SERPL ISE-MCNC: 8 MG/DL (ref 7–20)
CALCIUM SERPL-MCNC: 8.7 MG/DL (ref 8.5–10.1)
CHLORIDE SERPL-SCNC: 108 MMOL/L (ref 98–107)
CO2 SERPL-SCNC: 26 MMOL/L (ref 21–32)
CREAT SERPL-MCNC: 0.9 MG/DL (ref 0.6–1)
EOSINOPHIL NFR BLD: 0 % (ref 0–3)
EOSINOPHIL NFR BLD: 0 X10^3/UL (ref 0–0.7)
ERYTHROCYTE [DISTWIDTH] IN BLOOD BY AUTOMATED COUNT: 15.3 % (ref 11.5–14.5)
GFR SERPLBLD BASED ON 1.73 SQ M-ARVRAT: 63 ML/MIN
GLOBULIN SER-MCNC: 2.9 G/DL (ref 2.2–3.8)
GLUCOSE SERPL-MCNC: 103 MG/DL (ref 70–99)
HCT VFR BLD CALC: 39.3 % (ref 36–47)
HGB BLD-MCNC: 12.9 G/DL (ref 12–15.5)
LYMPHOCYTES # BLD: 2.8 X10^3/UL (ref 1–4.8)
LYMPHOCYTES NFR BLD AUTO: 25 % (ref 24–48)
MAGNESIUM SERPL-MCNC: 2.1 MG/DL (ref 1.8–2.4)
MCH RBC QN AUTO: 29 PG (ref 25–35)
MCHC RBC AUTO-ENTMCNC: 33 G/DL (ref 31–37)
MCV RBC AUTO: 88 FL (ref 79–100)
MONO #: 0.7 X10^3/UL (ref 0–1.1)
MONOCYTES NFR BLD: 6 % (ref 0–9)
NEUT #: 8 X10^3UL (ref 1.8–7.7)
NEUTROPHILS NFR BLD AUTO: 69 % (ref 31–73)
PLATELET # BLD AUTO: 216 X10^3/UL (ref 140–400)
POTASSIUM SERPL-SCNC: 4.1 MMOL/L (ref 3.5–5.1)
PROT SERPL-MCNC: 6.3 G/DL (ref 6.4–8.2)
RBC # BLD AUTO: 4.46 X10^6/UL (ref 3.5–5.4)
SODIUM SERPL-SCNC: 144 MMOL/L (ref 136–145)
WBC # BLD AUTO: 11.5 X10^3/UL (ref 4–11)

## 2017-03-21 RX ADMIN — CLOPIDOGREL BISULFATE SCH MG: 75 TABLET ORAL at 08:56

## 2017-03-21 RX ADMIN — CETIRIZINE HYDROCHLORIDE SCH MG: 10 TABLET, FILM COATED ORAL at 08:56

## 2017-03-21 RX ADMIN — AZITHROMYCIN SCH MG: 250 TABLET, FILM COATED ORAL at 08:57

## 2017-03-21 RX ADMIN — METOPROLOL TARTRATE SCH MG: 25 TABLET, FILM COATED ORAL at 08:56

## 2017-03-21 RX ADMIN — IPRATROPIUM BROMIDE AND ALBUTEROL SULFATE SCH ML: .5; 3 SOLUTION RESPIRATORY (INHALATION) at 09:39

## 2017-03-21 RX ADMIN — PANTOPRAZOLE SODIUM SCH MG: 40 TABLET, DELAYED RELEASE ORAL at 08:55

## 2017-03-21 RX ADMIN — HYDROCODONE BITARTRATE AND ACETAMINOPHEN PRN TAB: 7.5; 325 TABLET ORAL at 06:06

## 2017-03-21 RX ADMIN — ASPIRIN SCH MG: 81 TABLET, COATED ORAL at 08:55

## 2017-03-21 RX ADMIN — IPRATROPIUM BROMIDE AND ALBUTEROL SULFATE SCH ML: .5; 3 SOLUTION RESPIRATORY (INHALATION) at 05:37

## 2017-03-21 RX ADMIN — ENOXAPARIN SODIUM SCH MG: 100 INJECTION SUBCUTANEOUS at 08:55

## 2017-03-21 RX ADMIN — POTASSIUM CHLORIDE SCH MEQ: 1500 TABLET, EXTENDED RELEASE ORAL at 08:56

## 2017-03-21 RX ADMIN — FLUTICASONE PROPIONATE SCH SPRAY: 50 SPRAY, METERED NASAL at 08:55

## 2017-03-21 RX ADMIN — OSELTAMIVIR PHOSPHATE SCH MG: 30 CAPSULE ORAL at 08:56

## 2017-03-21 NOTE — DS
DATE OF DISCHARGE:  03/21/2017



DISCHARGE DIAGNOSES:

1.  Influenza type A.

2.  Influenza type B.

3.  Dyspnea.

4.  Acute exacerbation of chronic obstructive pulmonary disease.

5.  Coronary artery disease -- stable.

6.  Anxiety.



HOSPITAL COURSE:  A 66-year-old female developed dyspnea and a fever after a

night in a private club where there was smoking.  She was not aware, total a 3

hours that there had been smoking.  She was treated with Tamiflu, antibiotics

and oral steroids, tolerated well and has had breathing treatments.



PHYSICAL EXAMINATION:

VITAL SIGNS:  On the day of discharge, her blood pressure 106/66, pulse 72,

respirations 20, temperature 97.9.  Pulse ox 93% on room air, 95% on room air.

LUNGS:  Clear and did not hear her cough.

CARDIOVASCULAR:  Regular rhythm and rate.

EXTREMITIES:  Without edema.



DISPOSITION:  To home.



DISCHARGE MEDICATIONS:  See MRAD.





______________________________

NEELA YANEZ DO



DR:  SILVIA/greg  JOB#:  [id number] / [id number]

DD:  03/21/2017 11:49  DT:  03/21/2017 17:24

## 2017-03-23 ENCOUNTER — HOSPITAL ENCOUNTER (EMERGENCY)
Dept: HOSPITAL 63 - ER | Age: 67
Discharge: HOME | End: 2017-03-23
Payer: COMMERCIAL

## 2017-03-23 VITALS — DIASTOLIC BLOOD PRESSURE: 78 MMHG | SYSTOLIC BLOOD PRESSURE: 131 MMHG

## 2017-03-23 DIAGNOSIS — R11.10: Primary | ICD-10-CM

## 2017-03-23 DIAGNOSIS — R19.7: ICD-10-CM

## 2017-03-23 DIAGNOSIS — J45.909: ICD-10-CM

## 2017-03-23 DIAGNOSIS — Z88.8: ICD-10-CM

## 2017-03-23 DIAGNOSIS — K21.9: ICD-10-CM

## 2017-03-23 DIAGNOSIS — Z87.891: ICD-10-CM

## 2017-03-23 DIAGNOSIS — I25.10: ICD-10-CM

## 2017-03-23 DIAGNOSIS — Z91.030: ICD-10-CM

## 2017-03-23 DIAGNOSIS — J44.9: ICD-10-CM

## 2017-03-23 PROCEDURE — 99283 EMERGENCY DEPT VISIT LOW MDM: CPT

## 2017-03-23 RX ADMIN — ONDANSETRON ONE MG: 4 TABLET, ORALLY DISINTEGRATING ORAL at 10:24

## 2017-03-27 NOTE — ED.ADGEN
Past History


Past Medical History:  Asthma, CAD, COPD, GERD


Past Surgical History:  Knee Replacement, Tonsillectomy, Tubal ligation


Smoking:  Quit Greater Than 1 Year


Alcohol Use:  Occasionally


Drug Use:  None





Adult General


Chief Complaint


Chief Complaint


Vomiting and diarrhea





[facility]


HPI





Patient is a 66-year-old female presents with acute onset of vomiting diarrhea 

earlier today. Patient reports nausea. Denies abdominal pain. Denies dizziness 

lightheadedness, blood in stool or blood in vomit. No recent antibiotic use or 

known GI illness exposures. No other acute symptoms or complaints.





Review of Systems


Review of Systems


ROS as per HPI. All other review symptoms regular.





Current Medications


Current Medications





 Current Medications








 Medications


  (Trade)  Dose


 Ordered  Sig/Mar  Start Time


 Stop Time Status Last Admin


Dose Admin


 


 Ondansetron HCl


  (Zofran Odt)  4 mg  STK-MED ONCE  3/23/17 10:22


 3/23/17 10:23 DC  


 











Allergies


Allergies





 Allergies








Coded Allergies Type Severity Reaction Last Updated Verified


 


  venom-honey bee Allergy Severe Shortness of Air 5/29/14 Yes


 


  cimetidine Allergy Intermediate "toxic" 7/19/15 Yes


 


  cimetidine HCl Allergy Intermediate "toxic" 7/19/15 Yes











Physical Exam


Physical Exam





Constitutional: Well developed, well nourished, no acute distress, non-toxic 

appearance. []


HENT: Normocephalic, atraumatic, bilateral external ears normal, oropharynx 

moist, no oral exudates, nose normal. []


Eyes: PERRLA, EOMI, conjunctiva normal, no discharge. [] 


Neck: Normal range of motion, no tenderness, supple, no stridor. [] 


Cardiovascular:Heart rate regular rhythm, no murmur []


Lungs & Thorax:  Bilateral breath sounds clear to auscultation []


Abdomen: Bowel sounds normal, soft, no tenderness, no masses, no pulsatile 

masses. [] 


Skin: Warm, dry, no erythema, no rash. [] 


Back: No tenderness, no CVA tenderness. [] 


Extremities: No tenderness, no cyanosis, no clubbing, ROM intact, no edema. [] 


Neurologic: Alert and oriented X 3, normal motor function, normal sensory 

function, no focal deficits noted. []


Psychologic: Affect normal, judgement normal, mood normal. []





Current Patient Data


Vital Signs





 Vital Signs








  Date Time  Temp Pulse Resp B/P Pulse Ox O2 Delivery O2 Flow Rate FiO2


 


3/23/17 11:40  67 18 131/78 95 Room Air  


 


3/23/17 10:07 97.7       











EKG


EKG


[]





Radiology/Procedures


Radiology/Procedures


[]


Impressions:


Vomiting and diarrhea





Course & Med Decision Making


Course & Med Decision Making


Pertinent Labs and Imaging studies reviewed. (See chart for details)





[Symptoms improved with medication in the ED]. Treat supportively with PCP 

follow-up. Return precautions reviewed.





Final Impression


Final Impression


[Vomiting and diarrhea]


Problems:  





Dragon Disclaimer


Dragon Disclaimer


This electronic medical record was generated, in whole or in part, using a 

voice recognition dictation system.








BESSIE HOPKINS DO Mar 27, 2017 08:25

## 2017-07-11 ENCOUNTER — HOSPITAL ENCOUNTER (EMERGENCY)
Dept: HOSPITAL 63 - ER | Age: 67
Discharge: HOME | End: 2017-07-11
Payer: MEDICAID

## 2017-07-11 VITALS — DIASTOLIC BLOOD PRESSURE: 84 MMHG | SYSTOLIC BLOOD PRESSURE: 148 MMHG

## 2017-07-11 DIAGNOSIS — Z88.8: ICD-10-CM

## 2017-07-11 DIAGNOSIS — Z91.038: ICD-10-CM

## 2017-07-11 DIAGNOSIS — Z87.891: ICD-10-CM

## 2017-07-11 DIAGNOSIS — M54.32: Primary | ICD-10-CM

## 2017-07-11 DIAGNOSIS — I25.10: ICD-10-CM

## 2017-07-11 DIAGNOSIS — J44.9: ICD-10-CM

## 2017-07-11 DIAGNOSIS — M25.552: ICD-10-CM

## 2017-07-11 DIAGNOSIS — K21.9: ICD-10-CM

## 2017-07-11 PROCEDURE — 99283 EMERGENCY DEPT VISIT LOW MDM: CPT

## 2017-07-11 PROCEDURE — 96372 THER/PROPH/DIAG INJ SC/IM: CPT

## 2017-07-11 NOTE — PHYS DOC
Past History


Past Medical History:  Asthma, CAD, COPD, GERD


Past Surgical History:  Knee Replacement, Tonsillectomy, Tubal ligation


Smoking:  Quit Greater Than 1 Year


Alcohol Use:  Occasionally


Drug Use:  None





Adult General


Chief Complaint


Chief Complaint:  LOWER EXT PAIN





HPI


HPI





Patient is a 66 year old female who presents with complaint of left hip pain. 

Patient states that her pain has been present over the past 3 weeks. Patient 

states that the pain has come and gone but over the past week the pain has 

become more constant. Patient states that the pain is sharp and radiates from 

her left hip all the way down to her toes. Patient states that the pain worsens 

with movement. Patient states that she has been taking Tylenol with no relief 

in symptoms. Patient states that she has Norco prescription at home from a 

previous knee replacement. Patient states she has taken only one of these 

tablets which she stated did not help much with her pain. Patient denies 

history of similar symptoms. Patient denies any trauma associated with onset of 

symptoms. Patient denies saddle anesthesia, loss of bowel or bladder control, 

or foot drop.





Review of Systems


Review of Systems





Constitutional: Denies fever or chills []


Eyes: Denies change in visual acuity, redness, or eye pain []


HENT: Denies nasal congestion or sore throat []


Respiratory: Denies cough or shortness of breath []


Cardiovascular: Denies chest pain or edema []


GI: Denies abdominal pain, nausea, vomiting, bloody stools or diarrhea []


: Denies dysuria or hematuria []


Musculoskeletal: Left hip and lower extremity pain []


Integument: Denies rash or skin lesions []


Neurologic: Denies headache, focal weakness or sensory changes []





Allergies


Allergies





Allergies








Coded Allergies Type Severity Reaction Last Updated Verified


 


  venom-honey bee Allergy Severe Shortness of Air 5/29/14 Yes


 


  cimetidine Allergy Intermediate "toxic" 7/19/15 Yes


 


  cimetidine HCl Allergy Intermediate "toxic" 7/19/15 Yes











Physical Exam


Physical Exam





Constitutional: Alert, afebrile, appears in mild to moderate discomfort. []


HENT: Normocephalic, atraumatic, bilateral external ears normal, oropharynx 

moist, no oral exudates, nose normal. []


Eyes: PERRLA, EOMI, conjunctiva normal, no discharge. [] 


Neck: Normal range of motion, no tenderness, supple, no stridor. [] 


Cardiovascular:Heart rate regular rhythm, no murmur []


Lungs & Thorax:  Bilateral breath sounds clear to auscultation []


Abdomen: Bowel sounds normal, soft, no tenderness, no masses, no pulsatile 

masses. [] 


Skin: Warm, dry, no erythema, no rash. [] 


Back: No midline tenderness or paraspinous muscle tenderness, tenderness to 

palpation along left upper portion of bilateral ear distribution of sciatic 

nerve, positive straight leg test in left lower extremity. [] 


Extremities: No obvious deformities, no cyanosis, no clubbing, ROM intact, no 

edema. [] 


Neurologic: Alert and oriented X 3, normal motor function, normal sensory 

function, no focal deficits noted. []





Current Patient Data


Vital Signs





 Vital Signs








  Date Time  Temp Pulse Resp B/P (MAP) Pulse Ox O2 Delivery O2 Flow Rate FiO2


 


7/11/17 00:31 98.1 85 18  94 Room Air  











EKG


EKG


Not performed []





Radiology/Procedures


Radiology/Procedures


Not performed []





Course & Med Decision Making


Course & Med Decision Making


Pertinent Labs and Imaging studies reviewed. (See chart for details)





Patient's symptoms appear consistent with sciatica. Patient given IM Toradol in 

the emergency department. The patient will continue on outpatient treatment 

with Flexeril and Medrol Dosepak. Advise follow-up in 3-4 days with patient's 

primary doctor for reevaluation and return to emergency department for any 

worsening symptoms. Patient voiced understanding and in agreement with 

treatment plan.





Dragon Disclaimer


Dragon Disclaimer


This chart was dictated in whole or in part using Voice Recognition software in 

a busy, high-work load, and often noisy Emergency Department environment.  It 

may contain unintended and wholly unrecognized errors or omissions.





Departure


Departure:


Impression:  


 Primary Impression:  


 Sciatic leg pain


Disposition:  01 HOME, SELF-CARE


Condition:  STABLE


Referrals:  


PB SAENZ (PCP)


Patient Instructions:  Sciatica





Additional Instructions:  


Follow-up with your primary doctor in the next 3-4 days for reevaluation. 

Return to emergency department for any worsening symptoms.


Scripts


Cyclobenzaprine Hcl (CYCLOBENZAPRINE HCL) 10 Mg Tablet


1 TAB PO TID Y for MUSCLE PAIN, #30 TAB


   Prov: JACOBY MERCEDES MD         7/11/17 


Methylprednisolone (MEDROL) 4 Mg Tab.ds.pk


1 PKG PO UD, #1 PKG


   Prov: JACOBY MERCEDES MD         7/11/17











JACOBY MERCEDES MD Jul 11, 2017 01:48

## 2017-07-12 ENCOUNTER — HOSPITAL ENCOUNTER (EMERGENCY)
Dept: HOSPITAL 63 - ER | Age: 67
Discharge: HOME | End: 2017-07-12
Payer: COMMERCIAL

## 2017-07-12 VITALS — DIASTOLIC BLOOD PRESSURE: 87 MMHG | SYSTOLIC BLOOD PRESSURE: 154 MMHG

## 2017-07-12 DIAGNOSIS — I25.10: ICD-10-CM

## 2017-07-12 DIAGNOSIS — Z87.891: ICD-10-CM
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
## 2017-07-12 DIAGNOSIS — J44.9: ICD-10-CM

## 2017-07-12 DIAGNOSIS — Z88.8: ICD-10-CM
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
## 2017-07-12 DIAGNOSIS — K21.9: ICD-10-CM

## 2017-07-12 DIAGNOSIS — Z91.030: ICD-10-CM

## 2017-07-12 DIAGNOSIS — I10: ICD-10-CM

## 2017-07-12 DIAGNOSIS — M79.641: Primary | ICD-10-CM
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
## 2017-07-12 PROCEDURE — 73110 X-RAY EXAM OF WRIST: CPT

## 2017-07-12 PROCEDURE — 29125 APPL SHORT ARM SPLINT STATIC: CPT

## 2017-07-12 PROCEDURE — 73130 X-RAY EXAM OF HAND: CPT

## 2017-07-12 NOTE — ED.ADGEN
Past History


Past Medical History:  Anxiety, Asthma, CAD, COPD, GERD


Past Surgical History:  Knee Replacement, Tonsillectomy, Tubal ligation


Smoking:  Quit Greater Than 1 Year


Alcohol Use:  Occasionally


Drug Use:  None





Adult General


HPI


HPI





Patient is a 46-year-old woman, history of COPD, CAD, anxiety, hypertension, 

who presents to the emergency department with complaint of right hand pain 

after a fall. Patient states that she tripped over her dog, and fell landing on 

her outstretched hand, states that "I landed straight on my thumb". She states 

that this occurred about 20 minutes prior to arrival. She has not taken any 

medication prior to come to the ED, is complaining of pain in the thumb and in 

the palmar aspect of the hand. No elbow pain, no shoulder pain, denies striking 

her head, denies pain in any other extremities. No shortness of breath or chest 

pain, no headache, no numbness or tingling, denies any preceding symptoms. 

Patient was brought to the ED by family.





Review of Systems


Review of Systems





Constitutional: Denies fever or chills []


Eyes: Denies change in visual acuity, redness, or eye pain []


HENT: Denies nasal congestion or sore throat []


Respiratory: Denies cough or shortness of breath []


Cardiovascular: No additional information not addressed in HPI []


GI: Denies abdominal pain, nausea, vomiting, bloody stools or diarrhea []


: Denies dysuria or hematuria []


Musculoskeletal: Denies back pain, pain in the right thumb, wrist, and palmar 

aspect of the right hand.


Integument: Denies rash or skin lesions []


Neurologic: Denies headache, focal weakness or sensory changes []


Endocrine: Denies polyuria or polydipsia []





Current Medications


Current Medications





Current Medications








 Medications


  (Trade)  Dose


 Ordered  Sig/Mar  Start Time


 Stop Time Status Last Admin


Dose Admin


 


 Oxycodone/


 Acetaminophen


  (Percocet 5/325)  1 tab  1X  ONCE  7/12/17 19:45


 7/12/17 19:46 DC 7/12/17 19:37


1 TAB











Allergies


Allergies





Allergies








Coded Allergies Type Severity Reaction Last Updated Verified


 


  venom-honey bee Allergy Severe Shortness of Air 5/29/14 Yes


 


  cimetidine Allergy Intermediate "toxic" 7/19/15 Yes


 


  cimetidine HCl Allergy Intermediate "toxic" 7/19/15 Yes











Physical Exam


Physical Exam





Constitutional: Well developed, well nourished, mild distress secondary to pain

, ice pack in place over hand, non-toxic appearance. []


HENT: Normocephalic, atraumatic, bilateral external ears normal, oropharynx 

moist, no oral exudates, nose normal. []


Eyes: PERRLA, EOMI, conjunctiva normal, no discharge. [] 


Neck: Normal range of motion, no tenderness, supple, no stridor. [] 


Cardiovascular:Heart rate regular rhythm, no murmur, S1, S2, rubs or gallops. []


Lungs & Thorax:  Bilateral breath sounds clear to auscultation, no wheezing, 

rhonchi, rales. No chest wall crepitus or tenderness. []


Abdomen: Bowel sounds normal, soft, no rebound, rigidity, no guarding, no 

tenderness, no masses, no pulsatile masses. [] 


Skin: Warm, dry, no erythema, no rash. [] 


Back: No midline or paraspinal tenderness, no CVA tenderness. [] 


Extremities: Patient with tenderness palpation across the thenar eminence of 

the right hand, no deformity identified, no tenderness to palpation across the 

dorsal aspect, patient with full range of motion of the fingers aside from the 

thumb, mild pain with axial loading, does have range of motion of the wrist 

with some pain, no tenderness to palpation or pain in the forearm, elbow, 

shoulder, other extremities, skin is intact. No cyanosis, no clubbing, ROM 

intact, no edema. [] 


Neurologic: Alert and oriented X 3, normal motor function, normal sensory 

function, no focal deficits noted. []


Psychologic: Affect normal, judgement normal, mood normal. []





Current Patient Data


Vital Signs





 Vital Signs








  Date Time  Temp Pulse Resp B/P (MAP) Pulse Ox O2 Delivery O2 Flow Rate FiO2


 


7/12/17 19:37   20  96 Room Air  


 


7/12/17 19:21 97.6 77      











EKG


EKG


Not indicated. []





Radiology/Procedures


Radiology/Procedures


Right hand x-ray: Three-view: Patient with evidence of degenerative joint 

changes, no evidence of acute fracture or subluxation noted, no significant 

soft tissue swelling or other concerning findings identified. As interpreted by 

me. []





Course & Med Decision Making


Course & Med Decision Making


Pertinent Labs and Imaging studies reviewed. (See chart for details)





Patient without significant swelling or obvious deformity to the hand. X-ray 

does not reveal evidence of acutely concerning findings. However, patient 

patient's pain with axial loading, and location of pain along the thenar 

eminence, thumb and into the wrist, patient was placed in a thumb spica splint 

for support and protection, and instructed to follow-up with Dr. joy valenzuela of 

orthopedics for additional evaluation and repeat x-ray as needed. Discussed 

with patient that sometimes x-ray findings can be delayed, also discussed the 

patient's x-rays of the over read by radiology in the morning and if any 

changes are identified she'll be contacted via telephone. Patient received 

Percocet in the emergency department, with improvement of symptoms, although 

she continued to have pain. We discussed concerning symptoms that were prompt 

return to the emergency department, and states use of medication. Patient 

discharged home in stable condition with prescription, precautions and plan as 

above





Final Impression


Final Impression


[]


Problems:  





Dragon Disclaimer


Dragon Disclaimer


This electronic medical record was generated, in whole or in part, using a 

voice recognition dictation system.





Departure:


Impression:  


 Primary Impression:  


 Right hand pain


Disposition:  01 HOME, SELF-CARE


Condition:  IMPROVED


Scripts


Naproxen (NAPROXEN) 250 Mg Tablet


250 MG PO PRN BID Y for PAIN, #8 TAB


   Prov: AIDE GAVIRIA DO         7/12/17 


Docusate Sodium (COLACE) 100 Mg Capsule


1 CAP PO BID Y for CONSTIPATION, #20 CAP


   Prov: AIDE GAVIRIA DO         7/12/17 


Oxycodone Hcl/Acetaminophen (PERCOCET 5-325 MG TABLET) 1 Each Tablet


1 EACH PO PRN Q6HRS Y for PAIN, #12 TAB


   Prov: AIDE GAVIRIA DO         7/12/17


Splinting


[PATIENT/GUARDIAN/FAMILY:"Patient"] informed of findings. Right thumb spica 

splint applied by nurse Philly, patient evaluated by myself, Dr. Gaviria. 

Approximate stabilization, patient neurovascularly intact pre-and post-

application.











AIDE GAVIRIA DO Jul 12, 2017 19:35

## 2017-07-13 NOTE — RAD
Right hand and right wrist radiograph 7/12/2017 at 1958 hours



Indication: Fall, hand/wrist pain



Comparison: None available



Technique: 3 views of the right hand and 3 views of the right wrist are

provided.



Findings:



Right hand: There is no acute fracture or dislocation. Mild interphalangeal

joint space narrowing. No soft tissue swelling. No osseous erosion or soft

tissue gas. Mild demineralization of the visualized osseous structures.



Right wrist: There is joint space narrowing at the first carpometacarpal joint

with tiny osteophytes. Distal radius and ulna are intact. Mild soft tissue

swelling is noted.



Impression:

1. No acute fracture or dislocation.

2. 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

## 2017-08-12 ENCOUNTER — HOSPITAL ENCOUNTER (EMERGENCY)
Dept: HOSPITAL 63 - ER | Age: 67
Discharge: HOME | End: 2017-08-12
Payer: COMMERCIAL

## 2017-08-12 VITALS — SYSTOLIC BLOOD PRESSURE: 159 MMHG | DIASTOLIC BLOOD PRESSURE: 85 MMHG

## 2017-08-12 VITALS — HEIGHT: 59 IN | BODY MASS INDEX: 34.53 KG/M2 | WEIGHT: 171.3 LBS

## 2017-08-12 DIAGNOSIS — Z88.8: ICD-10-CM

## 2017-08-12 DIAGNOSIS — K21.9: ICD-10-CM
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
## 2017-08-12 DIAGNOSIS — Z91.030: ICD-10-CM

## 2017-08-12 DIAGNOSIS — Z87.891: ICD-10-CM

## 2017-08-12 DIAGNOSIS — G89.29: ICD-10-CM

## 2017-08-12 DIAGNOSIS — M62.838: Primary | ICD-10-CM

## 2017-08-12 DIAGNOSIS — J44.9: ICD-10-CM

## 2017-08-12 DIAGNOSIS — I25.10: ICD-10-CM
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
## 2017-08-12 DIAGNOSIS — R51: ICD-10-CM
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
## 2017-08-12 DIAGNOSIS — M54.2: ICD-10-CM

## 2017-08-12 PROCEDURE — 70450 CT HEAD/BRAIN W/O DYE: CPT

## 2017-08-12 NOTE — PHYS DOC
Past History


Past Medical History:  Anxiety, Asthma, CAD, COPD, GERD


Past Surgical History:  Knee Replacement, Tonsillectomy, Tubal ligation


Smoking:  Quit Greater Than 1 Year


Alcohol Use:  Occasionally


Drug Use:  None





Adult General


Chief Complaint


Chief Complaint:  HEAD INJURY/TRAUMA





[facility] [place]


HPI





Patient is a 66 year old F who presents with a fall.  Minna states that she 

fell just prior to arrival hitting her head. She feels that the pain in her 

head and neck are not different from her chronic pain.





Review of Systems


Review of Systems





Constitutional: Denies fever or chills []


Eyes: Denies change in visual acuity, redness, or eye pain []


HENT: Denies nasal congestion or sore throat []


Respiratory: Denies cough or shortness of breath []


Cardiovascular: No additional information not addressed in HPI []


GI: Denies abdominal pain, nausea, vomiting, bloody stools or diarrhea []


: Denies dysuria or hematuria []


Musculoskeletal: Denies back pain or joint pain []


Integument: Denies rash or skin lesions []


Neurologic: Negative except history of present illness


Endocrine: Denies polyuria or polydipsia []





Family History


Family History


Noncontributory





Current Medications


Current Medications


Medications reviewed





Allergies


Allergies





Allergies








Coded Allergies Type Severity Reaction Last Updated Verified


 


  venom-honey bee Allergy Severe Shortness of Air 5/29/14 Yes


 


  cimetidine Allergy Intermediate "toxic" 7/19/15 Yes


 


  cimetidine HCl Allergy Intermediate "toxic" 7/19/15 Yes











Physical Exam


Physical Exam





Constitutional: Well developed, well nourished, no acute distress, non-toxic 

appearance. []


HENT: Normocephalic, atraumatic, bilateral external ears normal, oropharynx 

moist, no oral exudates, nose normal. []


Eyes: PERRLA, EOMI, conjunctiva normal, no discharge. [] 


Neck: Normal range of motion,supple, no stridor. [] Perispinal muscle spasms


Cardiovascular:Heart rate regular rhythm, no murmur []


Lungs & Thorax:  Bilateral breath sounds clear to auscultation []


Abdomen: Bowel sounds normal, soft, no tenderness, no masses, no pulsatile 

masses. [] 


Skin: Warm, dry, no erythema, no rash. [] 


Back: No tenderness, no CVA tenderness. [] 


Extremities: No tenderness, no cyanosis, no clubbing, ROM intact, no edema. [] 


Neurologic: Alert and oriented X 3, normal motor function, normal sensory 

function, no focal deficits noted. []


Psychologic: Affect normal, judgement normal, mood normal. []





Current Patient Data


Vital Signs


Reviewed and within normal limits





EKG


EKG


[]





Radiology/Procedures


Radiology/Procedures


Head CT showed no acute disease []





Course & Med Decision Making


Course & Med Decision Making


Pertinent Labs and Imaging studies reviewed. (See chart for details)





[]





Dragon Disclaimer


Dragon Disclaimer


This chart was dictated in whole or in part using Voice Recognition software in 

a busy, high-work load, and often noisy Emergency Department environment.  It 

may contain unintended and wholly unrecognized errors or omissions.





Departure


Departure:


Impression:  


 Primary Impression:  


 Muscle spasm


Disposition:  01 HOME, SELF-CARE


Condition:  STABLE


Referrals:  


PB SAENZ (PCP)





Additional Instructions:  


Minna was seen in the ER for a fall. No emergency medical condition was found 

on his and physical exam. She did have normal imaging of her head. Her symptoms 

are most consistent with muscle spasm. She was given a prescription for muscle 

relaxer. She was strongly advised to consider yoga. She was advised to follow-

up with her primary care doctor as soon as possible for further management of 

her chronic medical condition











AARON WALTON MD Aug 12, 2017 17:42

## 2017-08-12 NOTE — RAD
EXAM: Head CT without contrast.

 

HISTORY: Fall. Headache. Dizziness.

 

TECHNIQUE: Computed tomographic images of the head were obtained without 

contrast. 

*One or more of the following individualized dose reduction techniques 

were utilized for this examination:  

1. Automated exposure control.  

2. Adjustment of the mA and/or kV according to patient size.  

3. Use of iterative reconstruction technique.

 

COMPARISON: None.

 

FINDINGS: There is no acute or subacute extra-axial or intraparenchymal 

hemorrhage. There is no mass effect or midline shift. There is no 

hydrocephalus. 

 

There are areas of decreased attenuation within the cerebral white matter,

nonspecific and likely related to chronic small vessel disease.

 

There is moderate to severe ethmoid sinus mucosal thickening. The mastoid 

air cells and orbits are unremarkable. No calvarial lesion is seen.

 

IMPRESSION:

1. No acute intracranial finding.

2. Subtle areas of hypodensity within the cerebral white matter, a 

nonspecific finding likely due to chronic small vessel disease.

 

Electronically signed by: Erica Vergara MD (8/12/2017 5:05 PM) [facility]

## 2017-10-21 ENCOUNTER — HOSPITAL ENCOUNTER (EMERGENCY)
Dept: HOSPITAL 63 - ER | Age: 67
Discharge: HOME | End: 2017-10-21
Payer: COMMERCIAL

## 2017-10-21 VITALS — DIASTOLIC BLOOD PRESSURE: 65 MMHG | SYSTOLIC BLOOD PRESSURE: 110 MMHG

## 2017-10-21 VITALS — WEIGHT: 171.3 LBS | BODY MASS INDEX: 34.53 KG/M2 | HEIGHT: 59 IN

## 2017-10-21 DIAGNOSIS — F41.9: ICD-10-CM

## 2017-10-21 DIAGNOSIS — J44.9: ICD-10-CM

## 2017-10-21 DIAGNOSIS — I10: ICD-10-CM

## 2017-10-21 DIAGNOSIS — Z87.891: ICD-10-CM
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
## 2017-10-21 DIAGNOSIS — I25.10: ICD-10-CM
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
## 2017-10-21 DIAGNOSIS — Z91.030: ICD-10-CM

## 2017-10-21 DIAGNOSIS — S69.91XA: Primary | ICD-10-CM
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
## 2017-10-21 DIAGNOSIS — M19.031: ICD-10-CM

## 2017-10-21 DIAGNOSIS — G56.01: ICD-10-CM

## 2017-10-21 DIAGNOSIS — K21.9: ICD-10-CM

## 2017-10-21 DIAGNOSIS — Z88.8: ICD-10-CM

## 2017-10-21 PROCEDURE — 73110 X-RAY EXAM OF WRIST: CPT

## 2017-10-21 PROCEDURE — 29125 APPL SHORT ARM SPLINT STATIC: CPT

## 2017-10-21 PROCEDURE — 99283 EMERGENCY DEPT VISIT LOW MDM: CPT

## 2017-10-21 PROCEDURE — 96372 THER/PROPH/DIAG INJ SC/IM: CPT

## 2017-10-21 PROCEDURE — 73130 X-RAY EXAM OF HAND: CPT

## 2017-10-21 PROCEDURE — 99284 EMERGENCY DEPT VISIT MOD MDM: CPT

## 2017-10-21 NOTE — ED.ADGEN
Past History


Past Medical History:  Anxiety, Asthma, CAD, COPD, GERD


Past Surgical History:  Knee Replacement, Tonsillectomy, Tubal ligation


Smoking:  Quit Greater Than 1 Year


Alcohol Use:  Occasionally


Drug Use:  None





Adult General


Chief Complaint


Chief Complaint


" My dog 'Miss Capone".. she tripped me up yesterday.. and I went down.. and 

hurt this hand and wrist..." (rt)  " I am here with Mrs. Romero.. We are


Neighbors..  are husbands hated each other... but they both  this year... 

And we are the best friends now..."





HPI


HPI





Patient is a 66 year old female who presents with Rt. hand wrist pain.  Distal 

capillary refill less than 2 seconds. Patient is right hand dominant. Patient 

localizes pain in right scaphoid area and up on loading of thumb and first 

finger. Patient unable to pinch left forefinger and thumb because of pain. 

Patient does have findings of neuropathy on percussion of the medial nerve 

area.  Patient does have findings of arthritic changes. Initial injury was a 

FOOSH type injury during a fall when she tripped over her dog yesterday. 

Patient does do extensive computer typing which seems to exacerbate the pain in 

right median nerve.  Pain is worse at night.  Patient denies history of gout or 

pseudogout. Patient denies any history of other injury from the fall. Patient 

normally follows with Dr. Ordonez.  No recent travel or immunosuppression. Does 

have a history of hypertension. She has also lost her  this year and 

accompanied patient in room A who has dental pain.





Review of Systems


Review of Systems





Constitutional: Denies fever or chills []


Eyes: Denies change in visual acuity, redness, or eye pain []


HENT: Denies nasal congestion or sore throat []


Respiratory: Denies cough or shortness of breath []


Cardiovascular: No additional information not addressed in HPI []


GI: Denies abdominal pain, nausea, vomiting, bloody stools or diarrhea []


: Denies dysuria or hematuria []


Musculoskeletal: Denies back pain or joint pain []


Integument: Denies rash or skin lesions []


Neurologic: Denies headache, focal weakness or sensory changes []


Endocrine: Denies polyuria or polydipsia []





Family History


Family History


Noncontributory





Current Medications


Current Medications





Current Medications








 Medications


  (Trade)  Dose


 Ordered  Sig/Mar  Start Time


 Stop Time Status Last Admin


Dose Admin


 


 Ketorolac


 Tromethamine


  (Toradol)  60 mg  1X  ONCE  10/21/17 18:30


 10/21/17 18:31 DC 10/21/17 18:30


60 MG





See nursing for home medications





Allergies


Allergies





Allergies








Coded Allergies Type Severity Reaction Last Updated Verified


 


  venom-honey bee Allergy Severe Shortness of Air 14 Yes


 


  cimetidine Allergy Intermediate "toxic" 7/19/15 Yes


 


  cimetidine HCl Allergy Intermediate "toxic" 7/19/15 Yes











Physical Exam


Physical Exam





Constitutional: Well developed, well nourished, in moderately acute distress, 

non-toxic appearance. []


HENT: Normocephalic, atraumatic, bilateral external ears normal, oropharynx 

moist, no oral exudates, nose normal. []


Eyes: PERRLA, EOMI, conjunctiva normal, no discharge. [] 


Neck: Normal range of motion, no tenderness, supple, no stridor. [] 


Cardiovascular:Heart rate regular rhythm, no murmur []


Lungs & Thorax:  Bilateral breath sounds clear to auscultation []


Abdomen: Bowel sounds normal, soft, no tenderness, no masses, no pulsatile 

masses. [] 


Skin: Warm, dry, no erythema, no rash. [] 


Back: No tenderness, no CVA tenderness. [] 


Extremities: Right wrist and hand tenderness as per history of present illness, 

no cyanosis, no clubbing, ROM intact, no edema except in right hand. Patient is 

right-hand dominant. Arthritic changes. Bilateral knee surgery scars


Neurologic: Alert and oriented X 3, normal motor function, normal sensory 

function, no focal deficits noted. []


Psychologic: Affect anxious, judgement normal, mood normal. []





Current Patient Data


Vital Signs





 Vital Signs








  Date Time  Temp Pulse Resp B/P (MAP) Pulse Ox O2 Delivery O2 Flow Rate FiO2


 


10/21/17 18:20 97.8 83 16  93 Room Air  











EKG


EKG


[]





Radiology/Procedures


Radiology/Procedures


My interpretation of hand and wrist shows degenerative joint changes. There is 

some suspect degenerative joint area in right scaphoid and base of right thumb.

  []





Course & Med Decision Making


Course & Med Decision Making


Pertinent Labs and Imaging studies reviewed. (See chart for details).





Ice, rest, elevation, splint, and consider re-x-ray in 2 weeks to see if there 

is callus formation of scaphoid. This may require orthopedic follow-up. Would 

not return to extensive computer work up to allow healing. May eventually need 

surgery consult for carpal tunnel syndrome. Tylenol and ibuprofen for pain. 

Follow-up primary care. Return if any concerns. Recommend follow-up orthopedics.





[]





Final Impression


Final Impression


1. Hand Rt.[] and wrist injury


2. Degenerative joint changes


3. Possible scaphoid injury-fracture


4. Findings consistent with carpal tunnel syndrome


Problems:  





Dragon Disclaimer


Dragon Disclaimer


This electronic medical record was generated, in whole or in part, using a 

voice recognition dictation system.











JESÚS LEIGH MD Oct 21, 2017 18:20

## 2017-10-22 NOTE — RAD
Three-view right wrist radiographs 10/21/2017



Clinical history: Fall with injury to the right wrist.



PA, lateral and oblique digital radiographs of the right wrist were obtained.

No fracture or dislocation of the right wrist is seen. Mild to moderate

degenerative changes are seen involving the radiocarpal joint, midcarpal joint

and first carpometacarpal joint.



Impression: No fracture or dislocation of the right wrist is seen.

## 2017-10-22 NOTE — RAD
Three-view right hand radiographs 10/21/2017



Clinical history: Fall with injury to the right hand.



PA, lateral and oblique digital radiographs of the right hand were obtained.

No fracture or dislocation of the right hand is seen. Mild to moderate

degenerative changes are seen throughout the interphalangeal joints of the

right hand along with the MCP joints and first carpometacarpal joint.



Impression: No fracture or dislocation of the right hand is seen.

## 2018-02-21 ENCOUNTER — HOSPITAL ENCOUNTER (OUTPATIENT)
Dept: HOSPITAL 63 - ER | Age: 68
Setting detail: OBSERVATION
LOS: 2 days | Discharge: HOME | End: 2018-02-23
Attending: FAMILY MEDICINE | Admitting: FAMILY MEDICINE
Payer: MEDICARE

## 2018-02-21 VITALS — SYSTOLIC BLOOD PRESSURE: 103 MMHG | DIASTOLIC BLOOD PRESSURE: 55 MMHG

## 2018-02-21 VITALS — DIASTOLIC BLOOD PRESSURE: 63 MMHG | SYSTOLIC BLOOD PRESSURE: 113 MMHG

## 2018-02-21 VITALS — DIASTOLIC BLOOD PRESSURE: 58 MMHG | SYSTOLIC BLOOD PRESSURE: 102 MMHG

## 2018-02-21 VITALS — SYSTOLIC BLOOD PRESSURE: 98 MMHG | DIASTOLIC BLOOD PRESSURE: 64 MMHG

## 2018-02-21 VITALS — BODY MASS INDEX: 33.6 KG/M2 | WEIGHT: 171.13 LBS | HEIGHT: 60 IN

## 2018-02-21 VITALS — DIASTOLIC BLOOD PRESSURE: 62 MMHG | SYSTOLIC BLOOD PRESSURE: 103 MMHG

## 2018-02-21 DIAGNOSIS — J44.1: ICD-10-CM

## 2018-02-21 DIAGNOSIS — K21.9: ICD-10-CM

## 2018-02-21 DIAGNOSIS — J20.9: ICD-10-CM

## 2018-02-21 DIAGNOSIS — E87.6: ICD-10-CM

## 2018-02-21 DIAGNOSIS — N39.0: ICD-10-CM

## 2018-02-21 DIAGNOSIS — D72.829: ICD-10-CM

## 2018-02-21 DIAGNOSIS — M19.90: ICD-10-CM

## 2018-02-21 DIAGNOSIS — Z87.891: ICD-10-CM

## 2018-02-21 DIAGNOSIS — R79.1: ICD-10-CM

## 2018-02-21 DIAGNOSIS — E78.5: ICD-10-CM

## 2018-02-21 DIAGNOSIS — J96.00: ICD-10-CM

## 2018-02-21 DIAGNOSIS — A41.9: Primary | ICD-10-CM

## 2018-02-21 LAB
ALBUMIN SERPL-MCNC: 3.8 G/DL (ref 3.4–5)
ALP SERPL-CCNC: 56 U/L (ref 46–116)
ALT SERPL-CCNC: 27 U/L (ref 14–59)
AMPHETAMINE/METHAMPHETAMINE: (no result)
ANION GAP SERPL CALC-SCNC: 12 MMOL/L (ref 6–14)
APTT PPP: YELLOW S
AST SERPL-CCNC: 21 U/L (ref 15–37)
BACTERIA #/AREA URNS HPF: (no result) /HPF
BARBITURATES UR-MCNC: (no result) UG/ML
BASOPHILS # BLD AUTO: 0.1 X10^3/UL (ref 0–0.2)
BASOPHILS NFR BLD: 1 % (ref 0–3)
BENZODIAZ UR-MCNC: (no result) UG/L
BILIRUB DIRECT SERPL-MCNC: 0.1 MG/DL (ref 0–0.2)
BILIRUB SERPL-MCNC: 0.5 MG/DL (ref 0.2–1)
BILIRUB UR QL STRIP: (no result)
CA-I SERPL ISE-MCNC: 9 MG/DL (ref 7–20)
CALCIUM SERPL-MCNC: 8.6 MG/DL (ref 8.5–10.1)
CANNABINOIDS UR-MCNC: (no result) UG/L
CHLORIDE SERPL-SCNC: 104 MMOL/L (ref 98–107)
CO2 SERPL-SCNC: 24 MMOL/L (ref 21–32)
COCAINE UR-MCNC: (no result) NG/ML
CREAT SERPL-MCNC: 0.7 MG/DL (ref 0.6–1)
EOSINOPHIL NFR BLD: 0.5 X10^3/UL (ref 0–0.7)
EOSINOPHIL NFR BLD: 4 % (ref 0–3)
ERYTHROCYTE [DISTWIDTH] IN BLOOD BY AUTOMATED COUNT: 14.3 % (ref 11.5–14.5)
FIBRINOGEN PPP-MCNC: CLEAR MG/DL
GFR SERPLBLD BASED ON 1.73 SQ M-ARVRAT: 84 ML/MIN
GLUCOSE SERPL-MCNC: 117 MG/DL (ref 70–99)
GLUCOSE UR STRIP-MCNC: (no result) MG/DL
HCT VFR BLD CALC: 46.3 % (ref 36–47)
HGB BLD-MCNC: 15.5 G/DL (ref 12–15.5)
INFLUENZA A PATIENT: NEGATIVE
INFLUENZA B PATIENT: NEGATIVE
LIPASE: 135 U/L (ref 73–393)
LYMPHOCYTES # BLD: 2.8 X10^3/UL (ref 1–4.8)
LYMPHOCYTES NFR BLD AUTO: 22 % (ref 24–48)
MAGNESIUM SERPL-MCNC: 1.8 MG/DL (ref 1.8–2.4)
MCH RBC QN AUTO: 30 PG (ref 25–35)
MCHC RBC AUTO-ENTMCNC: 33 G/DL (ref 31–37)
MCV RBC AUTO: 89 FL (ref 79–100)
METHADONE SERPL-MCNC: (no result) NG/ML
MONO #: 0.8 X10^3/UL (ref 0–1.1)
MONOCYTES NFR BLD: 7 % (ref 0–9)
NEUT #: 8.5 X10^3UL (ref 1.8–7.7)
NEUTROPHILS NFR BLD AUTO: 67 % (ref 31–73)
NITRITE UR QL STRIP: (no result)
OPIATES UR-MCNC: (no result) NG/ML
PCP SERPL-MCNC: (no result) MG/DL
PLATELET # BLD AUTO: 221 X10^3/UL (ref 140–400)
POTASSIUM SERPL-SCNC: 3.2 MMOL/L (ref 3.5–5.1)
PROT SERPL-MCNC: 6.9 G/DL (ref 6.4–8.2)
RBC # BLD AUTO: 5.22 X10^6/UL (ref 3.5–5.4)
RBC #/AREA URNS HPF: (no result) /HPF (ref 0–2)
SODIUM SERPL-SCNC: 140 MMOL/L (ref 136–145)
SP GR UR STRIP: 1.01
SQUAMOUS #/AREA URNS LPF: (no result) /LPF
UROBILINOGEN UR-MCNC: 0.2 MG/DL
WBC # BLD AUTO: 12.8 X10^3/UL (ref 4–11)
WBC #/AREA URNS HPF: >40 /HPF (ref 0–4)

## 2018-02-21 PROCEDURE — 83880 ASSAY OF NATRIURETIC PEPTIDE: CPT

## 2018-02-21 PROCEDURE — A9558 XE133 XENON 10MCI: HCPCS

## 2018-02-21 PROCEDURE — 96376 TX/PRO/DX INJ SAME DRUG ADON: CPT

## 2018-02-21 PROCEDURE — 93970 EXTREMITY STUDY: CPT

## 2018-02-21 PROCEDURE — 85610 PROTHROMBIN TIME: CPT

## 2018-02-21 PROCEDURE — 85730 THROMBOPLASTIN TIME PARTIAL: CPT

## 2018-02-21 PROCEDURE — 99285 EMERGENCY DEPT VISIT HI MDM: CPT

## 2018-02-21 PROCEDURE — 80053 COMPREHEN METABOLIC PANEL: CPT

## 2018-02-21 PROCEDURE — 87040 BLOOD CULTURE FOR BACTERIA: CPT

## 2018-02-21 PROCEDURE — 85379 FIBRIN DEGRADATION QUANT: CPT

## 2018-02-21 PROCEDURE — 71046 X-RAY EXAM CHEST 2 VIEWS: CPT

## 2018-02-21 PROCEDURE — 78582 LUNG VENTILAT&PERFUS IMAGING: CPT

## 2018-02-21 PROCEDURE — 84443 ASSAY THYROID STIM HORMONE: CPT

## 2018-02-21 PROCEDURE — 83735 ASSAY OF MAGNESIUM: CPT

## 2018-02-21 PROCEDURE — 81001 URINALYSIS AUTO W/SCOPE: CPT

## 2018-02-21 PROCEDURE — 93005 ELECTROCARDIOGRAM TRACING: CPT

## 2018-02-21 PROCEDURE — 80076 HEPATIC FUNCTION PANEL: CPT

## 2018-02-21 PROCEDURE — 94640 AIRWAY INHALATION TREATMENT: CPT

## 2018-02-21 PROCEDURE — 83690 ASSAY OF LIPASE: CPT

## 2018-02-21 PROCEDURE — 84484 ASSAY OF TROPONIN QUANT: CPT

## 2018-02-21 PROCEDURE — 80307 DRUG TEST PRSMV CHEM ANLYZR: CPT

## 2018-02-21 PROCEDURE — G0479 DRUG TEST PRESUMP NOT OPT: HCPCS

## 2018-02-21 PROCEDURE — 87880 STREP A ASSAY W/OPTIC: CPT

## 2018-02-21 PROCEDURE — 87086 URINE CULTURE/COLONY COUNT: CPT

## 2018-02-21 PROCEDURE — 96361 HYDRATE IV INFUSION ADD-ON: CPT

## 2018-02-21 PROCEDURE — A9540 TC99M MAA: HCPCS

## 2018-02-21 PROCEDURE — G0378 HOSPITAL OBSERVATION PER HR: HCPCS

## 2018-02-21 PROCEDURE — 96360 HYDRATION IV INFUSION INIT: CPT

## 2018-02-21 PROCEDURE — 36415 COLL VENOUS BLD VENIPUNCTURE: CPT

## 2018-02-21 PROCEDURE — 96374 THER/PROPH/DIAG INJ IV PUSH: CPT

## 2018-02-21 PROCEDURE — 87804 INFLUENZA ASSAY W/OPTIC: CPT

## 2018-02-21 PROCEDURE — 87070 CULTURE OTHR SPECIMN AEROBIC: CPT

## 2018-02-21 PROCEDURE — 80048 BASIC METABOLIC PNL TOTAL CA: CPT

## 2018-02-21 PROCEDURE — 96372 THER/PROPH/DIAG INJ SC/IM: CPT

## 2018-02-21 PROCEDURE — 96375 TX/PRO/DX INJ NEW DRUG ADDON: CPT

## 2018-02-21 PROCEDURE — G0379 DIRECT REFER HOSPITAL OBSERV: HCPCS

## 2018-02-21 PROCEDURE — 85025 COMPLETE CBC W/AUTO DIFF WBC: CPT

## 2018-02-21 PROCEDURE — 82553 CREATINE MB FRACTION: CPT

## 2018-02-21 RX ADMIN — ATORVASTATIN CALCIUM SCH MG: 20 TABLET, FILM COATED ORAL at 20:33

## 2018-02-21 RX ADMIN — IPRATROPIUM BROMIDE AND ALBUTEROL SULFATE SCH ML: .5; 3 SOLUTION RESPIRATORY (INHALATION) at 10:04

## 2018-02-21 RX ADMIN — IPRATROPIUM BROMIDE AND ALBUTEROL SULFATE SCH ML: .5; 3 SOLUTION RESPIRATORY (INHALATION) at 21:01

## 2018-02-21 RX ADMIN — CLOPIDOGREL BISULFATE SCH MG: 75 TABLET ORAL at 10:03

## 2018-02-21 RX ADMIN — ALBUTEROL SULFATE SCH MG: 108 AEROSOL, METERED RESPIRATORY (INHALATION) at 18:00

## 2018-02-21 RX ADMIN — BUDESONIDE SCH MG: 0.5 SUSPENSION RESPIRATORY (INHALATION) at 12:00

## 2018-02-21 RX ADMIN — FLUTICASONE PROPIONATE SCH SPRAY: 50 SPRAY, METERED NASAL at 10:03

## 2018-02-21 RX ADMIN — METOPROLOL SUCCINATE SCH MG: 25 TABLET, EXTENDED RELEASE ORAL at 10:03

## 2018-02-21 RX ADMIN — ASPIRIN SCH MG: 81 TABLET, COATED ORAL at 10:03

## 2018-02-21 RX ADMIN — Medication SCH CAP: at 10:02

## 2018-02-21 RX ADMIN — IPRATROPIUM BROMIDE AND ALBUTEROL SULFATE SCH ML: .5; 3 SOLUTION RESPIRATORY (INHALATION) at 10:03

## 2018-02-21 RX ADMIN — IPRATROPIUM BROMIDE AND ALBUTEROL SULFATE SCH ML: .5; 3 SOLUTION RESPIRATORY (INHALATION) at 15:41

## 2018-02-21 RX ADMIN — Medication SCH CAP: at 20:33

## 2018-02-21 RX ADMIN — BUDESONIDE SCH MG: 0.5 SUSPENSION RESPIRATORY (INHALATION) at 21:01

## 2018-02-21 RX ADMIN — MONTELUKAST SODIUM SCH MG: 10 TABLET ORAL at 10:10

## 2018-02-21 RX ADMIN — CEFTRIAXONE SODIUM SCH GM: 1 INJECTION, POWDER, FOR SOLUTION INTRAMUSCULAR; INTRAVENOUS at 21:52

## 2018-02-21 RX ADMIN — ALBUTEROL SULFATE SCH MG: 108 AEROSOL, METERED RESPIRATORY (INHALATION) at 12:00

## 2018-02-21 RX ADMIN — METHYLPREDNISOLONE SODIUM SUCCINATE SCH MG: 125 INJECTION, POWDER, FOR SOLUTION INTRAMUSCULAR; INTRAVENOUS at 09:00

## 2018-02-21 NOTE — HP
ADMIT DATE:  02/21/2018



REASON FOR ADMISSION:  This is a 67-year-old female who I previously cared for,

who reports a 2-3 day history of aching and being cold.  She tried to treat

herself with some chicken noodle soup and home remedies; however, continued to

be achy and cold and with some sputum production went from clear-to-yellow, now

it's greenish-yellow.  Blood pressure also was up 2 nights ago and was up a lot

yesterday.  She also felt quite sick Monday night, was dizzy and disoriented and

weak.  She is due to have a knee replacement this week, but it will have to be

postponed.





PAST MEDICAL HISTORY:  Reflux, hyperlipidemia, COPD, osteoarthritis, gastritis,

gastric ulcers, anxiety.





PAST SURGICAL HISTORY:  Left wrist fracture, had an open reduction and internal

fixation of the left wrist, right total knee, tubal ligation and tonsillectomy.





ALLERGIES:  TAGAMET and BEE VENOM, and ADHESIVE.





REVIEW OF SYSTEMS:  As per HPI, also chronic left knee pain due to

osteoarthritis and need for knee replacement.





OBJECTIVE:

VITAL SIGNS:  Blood pressure is 103/55, pulse 97, temperature 97.7, respirations

20, pulse ox 99% on room air.  She also had 92% on room air earlier this morning

noted.

GENERAL:  Mildly ill appearing.  She had a high-pitched cough.

HEENT:  Hearing is normal.  Eyes clear.  Throat is clear.  She is edentulous,

does have some congestion.

NECK:  Supple without adenopathy.

LUNGS:  With diffuse wheezes.

CARDIOVASCULAR:  Regular rhythm and rate.

ABDOMEN:  Soft, nontender.

EXTREMITIES:  Without edema.





LABORATORY AND DIAGNOSTIC DATA:  White blood cell count 12.8.  Chemistry: 

Potassium 3.2.  Troponin is 0.017.  D-dimer is 0.55, V/Q scan is pending. 

Urinalysis is greater than 40 white cells, leukocyte esterase large.  Influenza

is negative.  Strep is negative.





ASSESSMENT:

1.  Acute exacerbation of chronic obstructive pulmonary disease.

2.  Acute bronchitis.

3.  Leukocytosis does not appear septic.

4.  Elevated D-dimer.  VQ scan is pending.

5.  Hypokalemia.  We will replace.

6.  Urinary tract infection.





PLAN:  IV antibiotics, steroids, breathing treatments, V/Q scan, and IV fluids.





______________________________

NEELA YANEZ DO



DR:  Ángel  JOB#:  [id number] / [id number]

DD:  02/21/2018 12:48  DT:  02/21/2018 13:33

## 2018-02-21 NOTE — RAD
Chest, 2 views, 2/21/2018:



History: Cough, dyspnea



Comparison is made to a study from 3/18/2017.  The heart size and pulmonary

vascularity are within normal limits.  There is calcific plaquing of the

thoracic aorta.  No pulmonary infiltrate is seen.  There is no evidence of

pleural fluid.  Mild scattered spurs are present in the spine.



IMPRESSION: No acute cardiopulmonary abnormality is detected.

## 2018-02-21 NOTE — RAD
Ventilation/perfusion lung scan, 2/21/2000 813:



History: Elevated d-dimer



The ventilation study was performed utilizing 10 mCi of xenon-133.  Activity

in the lungs is homogeneous.  There is mild patchy retention of activity in

both lungs on the washout phase.  Perfusion imaging was performed utilizing

5.5 mCi of technetium 99m MAA.  A similar pattern of activity is present in

the lungs.  No segmental or unmatched perfusion defects are seen.





IMPRESSION:

1.  Abnormal ventilation study suggesting obstructive pulmonary disease.

2.  No VQ findings to suggest pulmonary emboli.

## 2018-02-21 NOTE — ED.ADGEN
Past History


Past Medical History:  Anxiety, Asthma, CAD, COPD, GERD


Past Surgical History:  Knee Replacement, Tonsillectomy, Tubal ligation


Smoking:  Quit Greater Than 1 Year


Alcohol Use:  Occasionally


Drug Use:  None





Adult General


Chief Complaint


Chief Complaint


".. This asthma and copd thing is kicking me again..."





HPI


HPI





Patient is a 67 year old female who presents with above hx and complaints 

dyspnea, severe coughing episodes, wheezing, myalgia, malaise, and fevers.  No 

recent travel or specific ill contacts. No history immunosuppression. Patient 

normally follows with Dr. Islas.  Pt. does have hx of COPD, Asthma,  and  

Arthritis.





Review of Systems


Review of Systems





Constitutional: Subjective history  of fever or chills []


Eyes: Denies change in visual acuity, redness, or eye pain []


HENT: History of nasal congestion and sore throat []


Respiratory: Severe coughing spasms and wheezing]


Cardiovascular: No additional information not addressed in HPI []


GI: Denies abdominal pain, nausea, vomiting, bloody stools or diarrhea []


: Denies dysuria or hematuria []


Musculoskeletal: Denies back pain or joint pain []


Integument: Denies rash or skin lesions []


Neurologic: Denies headache, focal weakness or sensory changes []


Endocrine: Denies polyuria or polydipsia []





All other systems were reviewed and found to be within normal limits, except as 

documented in this note.





Family History


Family History


Noncontributory





Current Medications


Current Medications





Current Medications








 Medications


  (Trade)  Dose


 Ordered  Sig/Mar  Start Time


 Stop Time Status Last Admin


Dose Admin


 


 Albuterol/


 Ipratropium


  (Duoneb)  3 ml  1X  ONCE  2/21/18 04:00


 2/21/18 04:15 DC 2/21/18 04:00


3 ML


 


 Aspirin


  (Darryl Aspirin)  325 mg  1X  ONCE  2/21/18 02:00


 2/21/18 02:01 DC 2/21/18 03:58


325 MG


 


 Azithromycin


  (Zithromax)  500 mg  1X  ONCE  2/21/18 02:00


 2/21/18 02:01 DC 2/21/18 03:58


500 MG


 


 Ceftriaxone


 Sodium 1 gm/


 Sodium Chloride  50 ml @ 


 100 mls/hr  1X  ONCE  2/21/18 01:45


 2/21/18 02:14 UNV  


 


 


 Ceftriaxone Sodium


  (Rocephin)  1 gm  1X  ONCE  2/21/18 02:00


 2/21/18 02:01 DC 2/21/18 02:00


1 GM


 


 Diphenhydramine


 HCl


  (Benadryl Oral


 Elixir)  25 mg  1X  ONCE  2/21/18 05:30


 2/21/18 05:31 DC 2/21/18 05:24


25 MG


 


 Enoxaparin Sodium


  (Lovenox 60mg


 Syringe)  60 mg  1X  ONCE  2/21/18 05:15


 2/21/18 05:16 UNV  


 


 


 Enoxaparin Sodium


  (Lovenox 80mg


 Syringe)  70 mg  BID  2/21/18 05:30


    2/21/18 05:24


70 MG


 


 Info


  (Anti-Coagulation


 Monitoring By


 Pharmacy)  1 each  PRN DAILY  PRN  2/21/18 05:30


     


 


 


 Methylprednisolone


 Sodium Succinate


  (SOLU-Medrol


 125MG VIAL)  125 mg  1X  ONCE  2/21/18 02:00


 2/21/18 02:01 DC 2/21/18 03:58


125 MG


 


 Morphine Sulfate


  (Morphine 10mg


 Syringe)  10 mg  1X  ONCE  2/21/18 05:30


 2/21/18 05:31 DC 2/21/18 05:24


10 MG


 


 Ondansetron HCl


  (Zofran)  4 mg  PRN Q4HRS  PRN  2/21/18 05:00


 2/22/18 04:59   


 


 


 Potassium Chloride


  (KCl Oral Soln)  40 meq  1X  ONCE  2/21/18 05:30


 2/21/18 05:31 DC  


 


 


 Sodium Chloride  1,000 ml @ 


 100 mls/hr  Q10H  2/21/18 02:00


 2/21/18 11:59  2/21/18 03:58


100 MLS/HR





See nursing for home meds





Allergies


Allergies





Allergies








Coded Allergies Type Severity Reaction Last Updated Verified


 


  venom-honey bee Allergy Severe Shortness of Air 5/29/14 Yes


 


  adhesive Allergy Intermediate  2/21/18 Yes


 


  cimetidine Allergy Intermediate "toxic" 7/19/15 Yes


 


  cimetidine HCl Allergy Intermediate "toxic" 7/19/15 Yes











Physical Exam


Physical Exam





Constitutional: Moderately acute distress, non-toxic appearance. []


HENT: Normocephalic, atraumatic, bilateral external ears normal, oropharynx 

moist, injected pharynx, no oral exudates, nose rhinorrhea


Eyes: PERRLA, EOMI, conjunctiva normal, no discharge. [] 


Neck: Normal range of motion, no tenderness, supple, no stridor. [] 


Cardiovascular:Heart rate regular rhythm, no murmur []


Lungs & Thorax:  Bilateral breath sounds equal with scattered wheezes 

throughout on auscultation []Severe episodes of periodic coughing spasms.


Abdomen: Bowel sounds normal, soft, no tenderness, no masses, no pulsatile 

masses. [] 


Skin: Warm, dry, no erythema, no rash. [] 


Back: No tenderness, no CVA tenderness. [] 


Extremities: No tenderness, no cyanosis, no clubbing, ROM intact, no edema. No 

cording appreciated.  Knee surgical scars. 


Neurologic: Alert and oriented X 3, normal motor function, normal sensory 

function, no focal deficits noted. []


Psychologic: Affect anxious, judgement normal, mood normal. []





Current Patient Data


Vital Signs





 Vital Signs








  Date Time  Temp Pulse Resp B/P (MAP) Pulse Ox O2 Delivery O2 Flow Rate FiO2


 


2/21/18 05:32  104 18 122/65 (84) 94 Room Air  


 


2/21/18 02:34 98.7       








Lab Results





 Laboratory Tests








Test


  2/21/18


01:47 2/21/18


01:51 2/21/18


01:55 2/21/18


04:08


 


Urine Collection Type Unknown     


 


Urine Color Yellow     


 


Urine Clarity Clear     


 


Urine pH 5.0     


 


Urine Specific Gravity 1.015     


 


Urine Protein


  Neg


(NEG-TRACE) 


  


  


 


 


Urine Glucose (UA)


  Neg mg/dL


(NEG) 


  


  


 


 


Urine Ketones (Stick)


  Trace mg/dL


(NEG) 


  


  


 


 


Urine Blood Small (NEG)     


 


Urine Nitrite Neg (NEG)     


 


Urine Bilirubin Neg (NEG)     


 


Urine Urobilinogen Dipstick


  0.2 mg/dL (0.2


mg/dL) 


  


  


 


 


Urine Leukocyte Esterase Large (NEG)     


 


Urine RBC


  Occ /HPF (0-2)


  


  


  


 


 


Urine WBC


  >40 /HPF (0-4)


  


  


  


 


 


Urine Squamous Epithelial


Cells Occ /LPF  


  


  


  


 


 


Urine Bacteria


  Few /HPF


(0-FEW) 


  


  


 


 


Urine Opiates Screen Neg (NEG)     


 


Urine Methadone Screen Neg (NEG)     


 


Urine Barbiturates Neg (NEG)     


 


Urine Phencyclidine Screen Neg (NEG)     


 


Urine


Amphetamine/Methamphetamine Neg (NEG)  


  


  


  


 


 


Urine Benzodiazepines Screen Neg (NEG)     


 


Urine Cocaine Screen Neg (NEG)     


 


Urine Cannabinoids Screen Neg (NEG)     


 


Urine Ethyl Alcohol Neg (NEG)     


 


Influenza Type A (Rapid)


  


  Negative


(NEGATIVE) 


  


 


 


Influenza Type B (Rapid)


  


  Negative


(NEGATIVE) 


  


 


 


Group A Streptococcus Rapid


  


  


  Negative


(NEGATIVE) 


 


 


White Blood Count


  


  


  


  12.8 x10^3/uL


(4.0-11.0)  H


 


Red Blood Count


  


  


  


  5.22 x10^6/uL


(3.50-5.40)


 


Hemoglobin


  


  


  


  15.5 g/dL


(12.0-15.5)


 


Hematocrit


  


  


  


  46.3 %


(36.0-47.0)


 


Mean Corpuscular Volume


  


  


  


  89 fL ()


 


 


Mean Corpuscular Hemoglobin    30 pg (25-35)  


 


Mean Corpuscular Hemoglobin


Concent 


  


  


  33 g/dL


(31-37)


 


Red Cell Distribution Width


  


  


  


  14.3 %


(11.5-14.5)


 


Platelet Count


  


  


  


  221 x10^3/uL


(140-400)


 


Neutrophils (%) (Auto)    67 % (31-73)  


 


Lymphocytes (%) (Auto)    22 % (24-48)  L


 


Monocytes (%) (Auto)    7 % (0-9)  


 


Eosinophils (%) (Auto)    4 % (0-3)  H


 


Basophils (%) (Auto)    1 % (0-3)  


 


Neutrophils # (Auto)


  


  


  


  8.5 x10^3uL


(1.8-7.7)  H


 


Lymphocytes # (Auto)


  


  


  


  2.8 x10^3/uL


(1.0-4.8)


 


Monocytes # (Auto)


  


  


  


  0.8 x10^3/uL


(0.0-1.1)


 


Eosinophils # (Auto)


  


  


  


  0.5 x10^3/uL


(0.0-0.7)


 


Basophils # (Auto)


  


  


  


  0.1 x10^3/uL


(0.0-0.2)


 


Prothrombin Time


  


  


  


  10.7 SEC


(9.4-11.4)


 


Prothrombin Time INR    1.0 (0.9-1.1)  


 


PTT


  


  


  


  22 SEC (23-33)


L


 


D-Dimer (Yessenia)


  


  


  


  0.55 mg/L


(0.00-0.50)  H


 


Sodium Level


  


  


  


  140 mmol/L


(136-145)


 


Potassium Level


  


  


  


  3.2 mmol/L


(3.5-5.1)  L


 


Chloride Level


  


  


  


  104 mmol/L


()


 


Carbon Dioxide Level


  


  


  


  24 mmol/L


(21-32)


 


Anion Gap    12 (6-14)  


 


Blood Urea Nitrogen


  


  


  


  9 mg/dL (7-20)


 


 


Creatinine


  


  


  


  0.7 mg/dL


(0.6-1.0)


 


Estimated GFR


(Cockcroft-Gault) 


  


  


  84.0  


 


 


Glucose Level


  


  


  


  117 mg/dL


(70-99)  H


 


Calcium Level


  


  


  


  8.6 mg/dL


(8.5-10.1)


 


Magnesium Level


  


  


  


  1.8 mg/dL


(1.8-2.4)


 


Total Bilirubin


  


  


  


  0.5 mg/dL


(0.2-1.0)


 


Direct Bilirubin


  


  


  


  0.1 mg/dL


(0.0-0.2)


 


Aspartate Amino Transferase


(AST) 


  


  


  21 U/L (15-37)


 


 


Alanine Aminotransferase (ALT)


  


  


  


  27 U/L (14-59)


 


 


Alkaline Phosphatase


  


  


  


  56 U/L


()


 


Creatine Kinase


  


  


  


  158 U/L


()


 


Creatine Kinase MB (Mass)


  


  


  


  < 0.5 ng/mL


(0.0-3.6)


 


Creatine Kinase MB Relative


Index 


  


  


  0.3 % (0-4)  


 


 


Troponin I Quantitative


  


  


  


  < 0.017 ng/mL


(0-0.055)


 


NT-Pro-B-Type Natriuretic


Peptide 


  


  


  54 pg/mL


(0-124)


 


Total Protein


  


  


  


  6.9 g/dL


(6.4-8.2)


 


Albumin


  


  


  


  3.8 g/dL


(3.4-5.0)


 


Lipase


  


  


  


  135 U/L


()











EKG


EKG


My interpretation EKG shows a sinus rhythm at 88 bpm. No findings acute STEMI. 

With contralateral changes .There is some nonspecific T-wave changes in the 

lateral leads.[]





Radiology/Procedures


Radiology/Procedures


My interpretation chest x-ray shows emphysema/COPD type pattern. No large 

infiltrate.  []





Course & Med Decision Making


Course & Med Decision Making


Pertinent Labs and Imaging studies reviewed. (See chart for details)-


Pt. has had multiple blood draws- Full labs pending at time of admission.





Discussed presentation, testing and tx. plan with Dr. Prescott.-  will admit 

for further eval and tx. 0500. 





[]





Final Impression


Final Impression


1. COPD exacerbation[]


2.  Leukocytosis


3. UTI


4. Hypokalemia


5. Elevated D-dimer


Problems:  





Dragon Disclaimer


Dragon Disclaimer


This electronic medical record was generated, in whole or in part, using a 

voice recognition dictation system.











JESÚS LEIGH MD Feb 21, 2018 01:33

## 2018-02-21 NOTE — EKG
[facility] [address], [place] [address]

Test Date:    2018               Test Time:    01:35:48

Pat Name:     PARISH RICO      Department:   

Patient ID:   [id number]           Room:          

Gender:       F                        Technician:   

:          1950               Requested By: JESÚS LEIGH

Order Number: [id number]            Reading MD:   John Ozuna

                                 Measurements

Intervals                              Axis          

Rate:         88                       P:            17

LA:           140                      QRS:          33

QRSD:         94                       T:            84

QT:           350                                    

QTc:          427                                    

                           Interpretive Statements

SINUS RHYTHM

ANTERIOR ST ELEVATION

T ABNORMALITY IN LATERAL LEADS

ABNORMAL ECG

RI6.01



Electronically Signed On 2018 12:43:56 CST by John Ozuna

## 2018-02-21 NOTE — RAD
Bilateral lower extremity venous ultrasound, 2/21/2018:



History: Elevated d-dimer



Duplex evaluation of the deep veins in the lower extremities was performed

including grayscale, color-flow and spectral Doppler analysis. The femoral and

popliteal veins demonstrate normal compressibility and normal responses to

distal augmentation maneuvers.  Color imaging of those vessels shows no

evidence of intraluminal clot.  The visualized deep veins in both calves are

patent. 



IMPRESSION:   There is no sonographic evidence of deep vein thrombosis in

either lower extremity.

## 2018-02-22 VITALS — DIASTOLIC BLOOD PRESSURE: 75 MMHG | SYSTOLIC BLOOD PRESSURE: 118 MMHG

## 2018-02-22 VITALS — DIASTOLIC BLOOD PRESSURE: 74 MMHG | SYSTOLIC BLOOD PRESSURE: 125 MMHG

## 2018-02-22 VITALS — SYSTOLIC BLOOD PRESSURE: 107 MMHG | DIASTOLIC BLOOD PRESSURE: 66 MMHG

## 2018-02-22 VITALS — DIASTOLIC BLOOD PRESSURE: 56 MMHG | SYSTOLIC BLOOD PRESSURE: 99 MMHG

## 2018-02-22 VITALS — DIASTOLIC BLOOD PRESSURE: 68 MMHG | SYSTOLIC BLOOD PRESSURE: 136 MMHG

## 2018-02-22 LAB
ALBUMIN SERPL-MCNC: 3.4 G/DL (ref 3.4–5)
ALBUMIN/GLOB SERPL: 1.1 {RATIO} (ref 1–1.7)
ALP SERPL-CCNC: 50 U/L (ref 46–116)
ALT SERPL-CCNC: 25 U/L (ref 14–59)
ANION GAP SERPL CALC-SCNC: 10 MMOL/L (ref 6–14)
AST SERPL-CCNC: 15 U/L (ref 15–37)
BASOPHILS # BLD AUTO: 0 X10^3/UL (ref 0–0.2)
BASOPHILS NFR BLD: 0 % (ref 0–3)
BILIRUB SERPL-MCNC: 0.2 MG/DL (ref 0.2–1)
BUN/CREAT SERPL: 13 (ref 6–20)
CA-I SERPL ISE-MCNC: 10 MG/DL (ref 7–20)
CALCIUM SERPL-MCNC: 8.3 MG/DL (ref 8.5–10.1)
CHLORIDE SERPL-SCNC: 106 MMOL/L (ref 98–107)
CO2 SERPL-SCNC: 25 MMOL/L (ref 21–32)
CREAT SERPL-MCNC: 0.8 MG/DL (ref 0.6–1)
EOSINOPHIL NFR BLD: 0 % (ref 0–3)
EOSINOPHIL NFR BLD: 0 X10^3/UL (ref 0–0.7)
ERYTHROCYTE [DISTWIDTH] IN BLOOD BY AUTOMATED COUNT: 14.6 % (ref 11.5–14.5)
GFR SERPLBLD BASED ON 1.73 SQ M-ARVRAT: 72 ML/MIN
GLOBULIN SER-MCNC: 3.1 G/DL (ref 2.2–3.8)
GLUCOSE SERPL-MCNC: 129 MG/DL (ref 70–99)
HCT VFR BLD CALC: 41.3 % (ref 36–47)
HGB BLD-MCNC: 13.7 G/DL (ref 12–15.5)
LYMPHOCYTES # BLD: 2.2 X10^3/UL (ref 1–4.8)
LYMPHOCYTES NFR BLD AUTO: 18 % (ref 24–48)
MAGNESIUM SERPL-MCNC: 2 MG/DL (ref 1.8–2.4)
MCH RBC QN AUTO: 30 PG (ref 25–35)
MCHC RBC AUTO-ENTMCNC: 33 G/DL (ref 31–37)
MCV RBC AUTO: 90 FL (ref 79–100)
MONO #: 1.1 X10^3/UL (ref 0–1.1)
MONOCYTES NFR BLD: 9 % (ref 0–9)
NEUT #: 9.1 X10^3UL (ref 1.8–7.7)
NEUTROPHILS NFR BLD AUTO: 73 % (ref 31–73)
PLATELET # BLD AUTO: 219 X10^3/UL (ref 140–400)
POTASSIUM SERPL-SCNC: 3.8 MMOL/L (ref 3.5–5.1)
PROT SERPL-MCNC: 6.5 G/DL (ref 6.4–8.2)
RBC # BLD AUTO: 4.61 X10^6/UL (ref 3.5–5.4)
SODIUM SERPL-SCNC: 141 MMOL/L (ref 136–145)
WBC # BLD AUTO: 12.4 X10^3/UL (ref 4–11)

## 2018-02-22 RX ADMIN — BUDESONIDE SCH MG: 0.5 SUSPENSION RESPIRATORY (INHALATION) at 21:45

## 2018-02-22 RX ADMIN — OXYCODONE HYDROCHLORIDE AND ACETAMINOPHEN PRN TAB: 5; 325 TABLET ORAL at 02:18

## 2018-02-22 RX ADMIN — ALBUTEROL SULFATE SCH MG: 108 AEROSOL, METERED RESPIRATORY (INHALATION) at 05:58

## 2018-02-22 RX ADMIN — ENOXAPARIN SODIUM SCH MG: 100 INJECTION SUBCUTANEOUS at 09:50

## 2018-02-22 RX ADMIN — CEFTRIAXONE SODIUM SCH GM: 1 INJECTION, POWDER, FOR SOLUTION INTRAMUSCULAR; INTRAVENOUS at 20:06

## 2018-02-22 RX ADMIN — ALBUTEROL SULFATE SCH MG: 108 AEROSOL, METERED RESPIRATORY (INHALATION) at 12:00

## 2018-02-22 RX ADMIN — ATORVASTATIN CALCIUM SCH MG: 20 TABLET, FILM COATED ORAL at 20:06

## 2018-02-22 RX ADMIN — ALBUTEROL SULFATE SCH MG: 108 AEROSOL, METERED RESPIRATORY (INHALATION) at 21:45

## 2018-02-22 RX ADMIN — ALBUTEROL SULFATE SCH MG: 108 AEROSOL, METERED RESPIRATORY (INHALATION) at 06:00

## 2018-02-22 RX ADMIN — ALBUTEROL SULFATE SCH MG: 108 AEROSOL, METERED RESPIRATORY (INHALATION) at 16:59

## 2018-02-22 RX ADMIN — BUDESONIDE SCH MG: 0.5 SUSPENSION RESPIRATORY (INHALATION) at 15:40

## 2018-02-22 RX ADMIN — METOPROLOL SUCCINATE SCH MG: 25 TABLET, EXTENDED RELEASE ORAL at 09:34

## 2018-02-22 RX ADMIN — Medication SCH CAP: at 09:33

## 2018-02-22 RX ADMIN — Medication SCH CAP: at 20:06

## 2018-02-22 RX ADMIN — AZITHROMYCIN SCH MG: 250 TABLET, FILM COATED ORAL at 09:34

## 2018-02-22 RX ADMIN — FLUTICASONE PROPIONATE SCH SPRAY: 50 SPRAY, METERED NASAL at 09:00

## 2018-02-22 RX ADMIN — BUDESONIDE SCH MG: 0.5 SUSPENSION RESPIRATORY (INHALATION) at 10:51

## 2018-02-22 RX ADMIN — OXYCODONE HYDROCHLORIDE AND ACETAMINOPHEN PRN TAB: 5; 325 TABLET ORAL at 23:49

## 2018-02-22 RX ADMIN — CLOPIDOGREL BISULFATE SCH MG: 75 TABLET ORAL at 09:34

## 2018-02-22 RX ADMIN — MONTELUKAST SODIUM SCH MG: 10 TABLET ORAL at 09:33

## 2018-02-22 RX ADMIN — ASPIRIN SCH MG: 81 TABLET, COATED ORAL at 09:33

## 2018-02-22 RX ADMIN — METHYLPREDNISOLONE SODIUM SUCCINATE SCH MG: 125 INJECTION, POWDER, FOR SOLUTION INTRAMUSCULAR; INTRAVENOUS at 09:35

## 2018-02-22 NOTE — PN
DATE:  02/22/2018



CURRENT PROBLEMS:

1.  Sepsis with UTI, culture pending.

2.  Acute exacerbation of COPD.

3.  Acute on chronic hypoxic respiratory failure.

4.  Elevated D-dimer.  V/Q scan negative.  Doing a little bit better today,

still short of breath, still sat 91% on room air, still very congested and weak.



OBJECTIVE:

VITAL SIGNS:  Blood pressure 102/58, pulse 102, respirations 18, temperature

98.3, pulse ox 91-93% on room air.

HEENT:  The patient still has a lot of nasal congestion.  Throat was clear.

NECK:  Supple.

LUNGS:  With scattered wheezes.

CARDIOVASCULAR:  Regular rhythm and rate.

ABDOMEN:  Soft, nontender.

EXTREMITIES:  Without edema.



LABORATORY DATA:  Shows elevated white count, probably compounded by steroids.



PLAN:  Continue antibiotics and wait for the urine culture, probably could

benefit from ____.  She is drinking fluids, so we will not initiate IV fluids,

but we will keep an eye on her blood pressure.





______________________________

NEELA YANEZ DO



DR:  SILVIA/greg  JOB#:  [id number] / [id number]

DD:  02/22/2018 12:26  DT:  02/22/2018 12:48

## 2018-02-23 VITALS — DIASTOLIC BLOOD PRESSURE: 64 MMHG | SYSTOLIC BLOOD PRESSURE: 110 MMHG

## 2018-02-23 VITALS — SYSTOLIC BLOOD PRESSURE: 136 MMHG | DIASTOLIC BLOOD PRESSURE: 76 MMHG

## 2018-02-23 RX ADMIN — Medication SCH CAP: at 09:02

## 2018-02-23 RX ADMIN — AZITHROMYCIN SCH MG: 250 TABLET, FILM COATED ORAL at 09:02

## 2018-02-23 RX ADMIN — ASPIRIN SCH MG: 81 TABLET, COATED ORAL at 09:02

## 2018-02-23 RX ADMIN — ALBUTEROL SULFATE SCH MG: 108 AEROSOL, METERED RESPIRATORY (INHALATION) at 05:56

## 2018-02-23 RX ADMIN — ENOXAPARIN SODIUM SCH MG: 100 INJECTION SUBCUTANEOUS at 09:03

## 2018-02-23 RX ADMIN — METOPROLOL SUCCINATE SCH MG: 25 TABLET, EXTENDED RELEASE ORAL at 09:03

## 2018-02-23 RX ADMIN — METHYLPREDNISOLONE SODIUM SUCCINATE SCH MG: 125 INJECTION, POWDER, FOR SOLUTION INTRAMUSCULAR; INTRAVENOUS at 09:02

## 2018-02-23 RX ADMIN — MONTELUKAST SODIUM SCH MG: 10 TABLET ORAL at 09:03

## 2018-02-23 RX ADMIN — CLOPIDOGREL BISULFATE SCH MG: 75 TABLET ORAL at 09:02

## 2018-02-23 RX ADMIN — FLUTICASONE PROPIONATE SCH SPRAY: 50 SPRAY, METERED NASAL at 09:00

## 2018-02-23 RX ADMIN — BUDESONIDE SCH MG: 0.5 SUSPENSION RESPIRATORY (INHALATION) at 09:47

## 2018-02-23 RX ADMIN — ALBUTEROL SULFATE SCH MG: 108 AEROSOL, METERED RESPIRATORY (INHALATION) at 09:47

## 2018-07-04 ENCOUNTER — HOSPITAL ENCOUNTER (OUTPATIENT)
Dept: HOSPITAL 63 - ER | Age: 68
Setting detail: OBSERVATION
LOS: 1 days | Discharge: HOME | End: 2018-07-05
Attending: INTERNAL MEDICINE | Admitting: INTERNAL MEDICINE
Payer: COMMERCIAL

## 2018-07-04 VITALS — SYSTOLIC BLOOD PRESSURE: 121 MMHG | DIASTOLIC BLOOD PRESSURE: 73 MMHG

## 2018-07-04 VITALS — SYSTOLIC BLOOD PRESSURE: 106 MMHG | DIASTOLIC BLOOD PRESSURE: 65 MMHG

## 2018-07-04 VITALS — SYSTOLIC BLOOD PRESSURE: 125 MMHG | DIASTOLIC BLOOD PRESSURE: 60 MMHG

## 2018-07-04 VITALS — WEIGHT: 169 LBS | BODY MASS INDEX: 33.18 KG/M2 | HEIGHT: 60 IN

## 2018-07-04 VITALS — SYSTOLIC BLOOD PRESSURE: 130 MMHG | DIASTOLIC BLOOD PRESSURE: 86 MMHG

## 2018-07-04 VITALS — DIASTOLIC BLOOD PRESSURE: 76 MMHG | SYSTOLIC BLOOD PRESSURE: 148 MMHG

## 2018-07-04 VITALS — SYSTOLIC BLOOD PRESSURE: 111 MMHG | DIASTOLIC BLOOD PRESSURE: 68 MMHG

## 2018-07-04 VITALS — SYSTOLIC BLOOD PRESSURE: 129 MMHG | DIASTOLIC BLOOD PRESSURE: 72 MMHG

## 2018-07-04 VITALS — SYSTOLIC BLOOD PRESSURE: 113 MMHG | DIASTOLIC BLOOD PRESSURE: 70 MMHG

## 2018-07-04 DIAGNOSIS — Z83.3: ICD-10-CM

## 2018-07-04 DIAGNOSIS — E78.5: ICD-10-CM

## 2018-07-04 DIAGNOSIS — Z79.899: ICD-10-CM

## 2018-07-04 DIAGNOSIS — I44.7: ICD-10-CM

## 2018-07-04 DIAGNOSIS — I10: ICD-10-CM

## 2018-07-04 DIAGNOSIS — L50.9: ICD-10-CM

## 2018-07-04 DIAGNOSIS — Z95.5: ICD-10-CM

## 2018-07-04 DIAGNOSIS — R50.9: ICD-10-CM

## 2018-07-04 DIAGNOSIS — R53.1: ICD-10-CM

## 2018-07-04 DIAGNOSIS — Z80.8: ICD-10-CM

## 2018-07-04 DIAGNOSIS — R11.0: ICD-10-CM

## 2018-07-04 DIAGNOSIS — Z79.52: ICD-10-CM

## 2018-07-04 DIAGNOSIS — I25.10: ICD-10-CM

## 2018-07-04 DIAGNOSIS — Z80.3: ICD-10-CM

## 2018-07-04 DIAGNOSIS — Z82.49: ICD-10-CM

## 2018-07-04 DIAGNOSIS — L29.9: ICD-10-CM

## 2018-07-04 DIAGNOSIS — Z87.891: ICD-10-CM

## 2018-07-04 DIAGNOSIS — N39.0: ICD-10-CM

## 2018-07-04 DIAGNOSIS — K58.9: ICD-10-CM

## 2018-07-04 DIAGNOSIS — K21.9: ICD-10-CM

## 2018-07-04 DIAGNOSIS — J44.1: Primary | ICD-10-CM

## 2018-07-04 DIAGNOSIS — M79.1: ICD-10-CM

## 2018-07-04 DIAGNOSIS — G89.4: ICD-10-CM

## 2018-07-04 LAB
ALBUMIN SERPL-MCNC: 3.8 G/DL (ref 3.4–5)
ALP SERPL-CCNC: 69 U/L (ref 46–116)
ALT SERPL-CCNC: 28 U/L (ref 14–59)
AMPHETAMINE/METHAMPHETAMINE: (no result)
ANION GAP SERPL CALC-SCNC: 10 MMOL/L (ref 6–14)
APTT PPP: YELLOW S
AST SERPL-CCNC: 22 U/L (ref 15–37)
BACTERIA #/AREA URNS HPF: (no result) /HPF
BARBITURATES UR-MCNC: (no result) UG/ML
BASOPHILS # BLD AUTO: 0.1 X10^3/UL (ref 0–0.2)
BASOPHILS NFR BLD: 1 % (ref 0–3)
BENZODIAZ UR-MCNC: (no result) UG/L
BILIRUB DIRECT SERPL-MCNC: 0.1 MG/DL (ref 0–0.2)
BILIRUB SERPL-MCNC: 0.3 MG/DL (ref 0.2–1)
BILIRUB UR QL STRIP: (no result)
CA-I SERPL ISE-MCNC: 9 MG/DL (ref 7–20)
CALCIUM SERPL-MCNC: 8.9 MG/DL (ref 8.5–10.1)
CANNABINOIDS UR-MCNC: (no result) UG/L
CHLORIDE SERPL-SCNC: 101 MMOL/L (ref 98–107)
CO2 SERPL-SCNC: 26 MMOL/L (ref 21–32)
COCAINE UR-MCNC: (no result) NG/ML
CREAT SERPL-MCNC: 0.8 MG/DL (ref 0.6–1)
EOSINOPHIL NFR BLD: 0.3 X10^3/UL (ref 0–0.7)
EOSINOPHIL NFR BLD: 4 % (ref 0–3)
ERYTHROCYTE [DISTWIDTH] IN BLOOD BY AUTOMATED COUNT: 14.9 % (ref 11.5–14.5)
FIBRINOGEN PPP-MCNC: CLEAR MG/DL
GFR SERPLBLD BASED ON 1.73 SQ M-ARVRAT: 72 ML/MIN
GLUCOSE SERPL-MCNC: 106 MG/DL (ref 70–99)
GLUCOSE UR STRIP-MCNC: (no result) MG/DL
HCT VFR BLD CALC: 46.6 % (ref 36–47)
HGB BLD-MCNC: 15.4 G/DL (ref 12–15.5)
LIPASE: 138 U/L (ref 73–393)
LYMPHOCYTES # BLD: 2.1 X10^3/UL (ref 1–4.8)
LYMPHOCYTES NFR BLD AUTO: 26 % (ref 24–48)
MAGNESIUM SERPL-MCNC: 2.2 MG/DL (ref 1.8–2.4)
MCH RBC QN AUTO: 29 PG (ref 25–35)
MCHC RBC AUTO-ENTMCNC: 33 G/DL (ref 31–37)
MCV RBC AUTO: 89 FL (ref 79–100)
METHADONE SERPL-MCNC: (no result) NG/ML
MONO #: 0.5 X10^3/UL (ref 0–1.1)
MONOCYTES NFR BLD: 6 % (ref 0–9)
NEUT #: 5.4 X10^3UL (ref 1.8–7.7)
NEUTROPHILS NFR BLD AUTO: 64 % (ref 31–73)
NITRITE UR QL STRIP: (no result)
OPIATES UR-MCNC: (no result) NG/ML
PCP SERPL-MCNC: (no result) MG/DL
PLATELET # BLD AUTO: 301 X10^3/UL (ref 140–400)
POTASSIUM SERPL-SCNC: 3.7 MMOL/L (ref 3.5–5.1)
PROT SERPL-MCNC: 7.3 G/DL (ref 6.4–8.2)
RBC # BLD AUTO: 5.26 X10^6/UL (ref 3.5–5.4)
RBC #/AREA URNS HPF: 0 /HPF (ref 0–2)
SODIUM SERPL-SCNC: 137 MMOL/L (ref 136–145)
SP GR UR STRIP: 1.01
SQUAMOUS #/AREA URNS LPF: (no result) /LPF
UROBILINOGEN UR-MCNC: 0.2 MG/DL
WBC # BLD AUTO: 8.4 X10^3/UL (ref 4–11)
WBC #/AREA URNS HPF: (no result) /HPF (ref 0–4)

## 2018-07-04 PROCEDURE — 80048 BASIC METABOLIC PNL TOTAL CA: CPT

## 2018-07-04 PROCEDURE — 93005 ELECTROCARDIOGRAM TRACING: CPT

## 2018-07-04 PROCEDURE — 71046 X-RAY EXAM CHEST 2 VIEWS: CPT

## 2018-07-04 PROCEDURE — S0028 INJECTION, FAMOTIDINE, 20 MG: HCPCS

## 2018-07-04 PROCEDURE — 84443 ASSAY THYROID STIM HORMONE: CPT

## 2018-07-04 PROCEDURE — 71275 CT ANGIOGRAPHY CHEST: CPT

## 2018-07-04 PROCEDURE — 85025 COMPLETE CBC W/AUTO DIFF WBC: CPT

## 2018-07-04 PROCEDURE — 96374 THER/PROPH/DIAG INJ IV PUSH: CPT

## 2018-07-04 PROCEDURE — 86038 ANTINUCLEAR ANTIBODIES: CPT

## 2018-07-04 PROCEDURE — G0378 HOSPITAL OBSERVATION PER HR: HCPCS

## 2018-07-04 PROCEDURE — 86021 WBC ANTIBODY IDENTIFICATION: CPT

## 2018-07-04 PROCEDURE — 36415 COLL VENOUS BLD VENIPUNCTURE: CPT

## 2018-07-04 PROCEDURE — 83690 ASSAY OF LIPASE: CPT

## 2018-07-04 PROCEDURE — 85651 RBC SED RATE NONAUTOMATED: CPT

## 2018-07-04 PROCEDURE — 83880 ASSAY OF NATRIURETIC PEPTIDE: CPT

## 2018-07-04 PROCEDURE — 86140 C-REACTIVE PROTEIN: CPT

## 2018-07-04 PROCEDURE — 96376 TX/PRO/DX INJ SAME DRUG ADON: CPT

## 2018-07-04 PROCEDURE — 87880 STREP A ASSAY W/OPTIC: CPT

## 2018-07-04 PROCEDURE — 94640 AIRWAY INHALATION TREATMENT: CPT

## 2018-07-04 PROCEDURE — 80053 COMPREHEN METABOLIC PANEL: CPT

## 2018-07-04 PROCEDURE — 96375 TX/PRO/DX INJ NEW DRUG ADDON: CPT

## 2018-07-04 PROCEDURE — 96361 HYDRATE IV INFUSION ADD-ON: CPT

## 2018-07-04 PROCEDURE — 80076 HEPATIC FUNCTION PANEL: CPT

## 2018-07-04 PROCEDURE — G0479 DRUG TEST PRESUMP NOT OPT: HCPCS

## 2018-07-04 PROCEDURE — 85730 THROMBOPLASTIN TIME PARTIAL: CPT

## 2018-07-04 PROCEDURE — G0238 OTH RESP PROC, INDIV: HCPCS

## 2018-07-04 PROCEDURE — 85007 BL SMEAR W/DIFF WBC COUNT: CPT

## 2018-07-04 PROCEDURE — 85610 PROTHROMBIN TIME: CPT

## 2018-07-04 PROCEDURE — 81001 URINALYSIS AUTO W/SCOPE: CPT

## 2018-07-04 PROCEDURE — 96372 THER/PROPH/DIAG INJ SC/IM: CPT

## 2018-07-04 PROCEDURE — 83735 ASSAY OF MAGNESIUM: CPT

## 2018-07-04 PROCEDURE — 93970 EXTREMITY STUDY: CPT

## 2018-07-04 PROCEDURE — 87086 URINE CULTURE/COLONY COUNT: CPT

## 2018-07-04 PROCEDURE — 99285 EMERGENCY DEPT VISIT HI MDM: CPT

## 2018-07-04 PROCEDURE — 84484 ASSAY OF TROPONIN QUANT: CPT

## 2018-07-04 PROCEDURE — 80307 DRUG TEST PRSMV CHEM ANLYZR: CPT

## 2018-07-04 PROCEDURE — 85379 FIBRIN DEGRADATION QUANT: CPT

## 2018-07-04 PROCEDURE — 82553 CREATINE MB FRACTION: CPT

## 2018-07-04 PROCEDURE — G0379 DIRECT REFER HOSPITAL OBSERV: HCPCS

## 2018-07-04 PROCEDURE — 87070 CULTURE OTHR SPECIMN AEROBIC: CPT

## 2018-07-04 PROCEDURE — 87040 BLOOD CULTURE FOR BACTERIA: CPT

## 2018-07-04 RX ADMIN — Medication SCH CAP: at 21:19

## 2018-07-04 RX ADMIN — METHYLPREDNISOLONE SODIUM SUCCINATE SCH MG: 40 INJECTION, POWDER, FOR SOLUTION INTRAMUSCULAR; INTRAVENOUS at 09:34

## 2018-07-04 RX ADMIN — IPRATROPIUM BROMIDE AND ALBUTEROL SULFATE SCH ML: .5; 3 SOLUTION RESPIRATORY (INHALATION) at 11:03

## 2018-07-04 RX ADMIN — SODIUM CHLORIDE, SODIUM LACTATE, POTASSIUM CHLORIDE, AND CALCIUM CHLORIDE SCH MLS/HR: .6; .31; .03; .02 INJECTION, SOLUTION INTRAVENOUS at 18:32

## 2018-07-04 RX ADMIN — IPRATROPIUM BROMIDE AND ALBUTEROL SULFATE SCH ML: .5; 3 SOLUTION RESPIRATORY (INHALATION) at 05:47

## 2018-07-04 RX ADMIN — IPRATROPIUM BROMIDE AND ALBUTEROL SULFATE SCH ML: .5; 3 SOLUTION RESPIRATORY (INHALATION) at 16:16

## 2018-07-04 RX ADMIN — FAMOTIDINE SCH MG: 20 TABLET ORAL at 09:35

## 2018-07-04 RX ADMIN — IPRATROPIUM BROMIDE AND ALBUTEROL SULFATE SCH ML: .5; 3 SOLUTION RESPIRATORY (INHALATION) at 20:47

## 2018-07-04 RX ADMIN — Medication SCH CAP: at 09:35

## 2018-07-04 NOTE — EKG
[facility] [address], [place] [address]

Test Date:    2018               Test Time:    01:39:48

Pat Name:     PARISH RICO      Department:   

Patient ID:   [id number]           Room:         105 A

Gender:       F                        Technician:   ABRAHAM

:          1950               Requested By: JESÚS LEIGH

Order Number: [id number]            Reading MD:   Bernardino Franz MD

                                 Measurements

Intervals                              Axis          

Rate:         67                       P:            52

MD:           156                      QRS:          1

QRSD:         126                      T:            54

QT:           436                                    

QTc:          464                                    

                           Interpretive Statements

SINUS RHYTHM

LBBB

Electronically Signed On 2018 12:57:36 CDT by Bernardino Franz MD

## 2018-07-04 NOTE — RAD
EXAM: Chest, 2 views

 

HISTORY: Dyspnea

 

COMPARISON: 2/21/2018

 

FINDINGS: Frontal and lateral views of the chest are obtained. There is 

suspected lingular and left lower lobe atelectasis. There is no 

infiltrate, pleural effusion or pneumothorax. The heart is normal in size.

 

IMPRESSION: Suspected lingular and left lower lobe atelectasis.

 

Electronically signed by: Erica Vergara MD (7/4/2018 7:37 AM) [facility]

## 2018-07-04 NOTE — RAD
EXAM: Bilateral lower extremity venous Doppler sonogram.

 

HISTORY: Elevated d-dimer. Swelling.

 

TECHNIQUE: Gray scale and color Doppler sonographic evaluation of the 

bilateral lower extremity veins with spectral waveform analysis was 

performed.

 

FINDINGS: There is normal color flow, normal compressibility and there are

normal spectral waveforms in the common femoral, superficial femoral, 

popliteal, posterior tibial and greater saphenous veins.

 

IMPRESSION: No Doppler evidence of lower extremity deep venous thrombosis.

 

Electronically signed by: Erica Vergara MD (7/4/2018 3:19 PM) [facility]

## 2018-07-04 NOTE — RAD
INDICATION: 101208.001 Omni 300 75cc: PE protocol: Dyspnea, cough, 

congestion, elevated d-dimer

 

COMPARISON: Chest x-ray earlier same day

 

TECHNIQUE: 

 

Axial CT images obtained through the chest. Intravenous contrast utilized.

Angiogram 3D images processed per protocol.

 

One or more of the following individualized dose reduction techniques were

utilized for this examination:  1. Automated exposure control;  2. 

Adjustment of the mA and/or kV according to patient size;  3. Use of 

iterative reconstruction technique.

 

FINDINGS:

 

 Nodular opacity in the lingula measuring up to about 13 mm. Linear 

opacities lower lungs.

No evidence of pneumothorax.

There are some minimal groundglass opacities.

Partially visualized liver is low-attenuation.

Exophytic cystic lesion right kidney. Nodular thickening of the right 

adrenal gland.

Moderate calcific atherosclerosis. Portion of a ascending thoracic aorta 

obscured by motion but no definite aneurysm or dissection flap in the 

visualized portions.

Coronary artery calcific atherosclerosis as well as suspected stent at 

right coronary artery.

If the patient does not have a stent the region and could be from calcific

atherosclerosis.

Small fluid in pericardial recess which is a common finding.

There are some scattered prominent lymph nodes in the mediastinum.

Degenerative changes spine.

No central pulmonary embolus

 

 

IMPRESSION:

 

No central pulmonary embolus.

 

There is some linear opacities at lung bases as well as a nodular opacity 

within the lingula. These could all be from atelectasis but given that the

lingular opacity is nodular in nature may be helpful to obtain a follow-up

exam in a few months to ensure no growth to exclude other causes such as a

lung nodule.

 

Electronically signed by: Nazario Montoya MD (7/4/2018 3:45 AM) [facility]

## 2018-07-04 NOTE — ED.ADGEN
Past History


Past Medical History:  Anxiety, Asthma, Bronchitis, COPD, Depression, GERD, IBS

, Other


Past Surgical History:  No Surgical History, Angioplasty, Hysterectomy, Knee 

Replacement, Other


Smoking:  Quit Greater Than 1 Year


Alcohol Use:  None


Drug Use:  None





Adult General


Chief Complaint


Chief Complaint


".. I was diagnosed with Rhinovirus.. but I have not gotten better... still 

short of breath.. fatigued..."  " Tonight I started getting Hives.. itchy 

everywhere.. . .. almost like I got an allergic reaction...but I am not on any 

antibiotics.. and have done nothing new"





HPI


HPI





Patient is a 67 year old female who presents with above hx and complaints 

congestion, hives, urticaria, pruritus, nausea,  dyspnea, fever, malaise, 

myalgia and fatigue.  Pt. follows with Dr. Islas as primary and  Dr. Whitt 

cardiology.  Pt. last admitted at [facility].  6/20/18.  Pt. completed 

medrol dose pack 4 days ago.  Non- productive cough.  Denies hx of 

immunosuppression,  specific ill contacts or recent travel.  No changes in foods

, hygiene products, soaps, or new meds such as antibiotics.  Pt has hx of 

exacerbation of Asthma like complaints with abrupt cessation of steroids.   No 

specific hx of triggers of her asthma exacerbations.  Pt. has not smoked for 

years.  Est. 40 pack yrs. Tob. use.     Pt. does not know her best peak flow or 

PFT's findings.  Pt. has hx of  rheumatological modalities.   Pt. denies any 

work up for vasculitis or specific inflammatory disorders.    Pt. has hx  COPD, 

CARDz with 3 stents,  HTN, Hx. Steroid dependent Asthma / Reactive airway, 

Nausea, GERD, Depression, Anxiety, Dysphagia, Chronic Pain, LBB, Osteoarthritis

, IBS, Chronic Pain, Gait disorder ect.





Review of Systems


Review of Systems





Constitutional:  Hx. of fever or chills []


Eyes: Denies change in visual acuity, redness, or eye pain []


HENT: Hx.  nasal congestion and sore throat []


Respiratory:  Hx of  cough , wheezing and  shortness of breath []


Cardiovascular: No additional information not addressed in HPI []


GI: Denies abdominal pain,, vomiting, bloody stools or diarrhea [] Hx. of nausea


: Denies dysuria or hematuria []


Musculoskeletal: Denies back pain or joint pain []


Integument: Denies rash or skin lesions []


Neurologic: Denies headache, focal weakness or sensory changes []


Endocrine: Denies polyuria or polydipsia []





All other systems were reviewed and found to be within normal limits, except as 

documented in this note.





Family History


Family History


CADz, HTN, DM





Current Medications


Current Medications





Current Medications








 Medications


  (Trade)  Dose


 Ordered  Sig/Mar  Start Time


 Stop Time Status Last Admin


Dose Admin


 


 Albuterol/


 Ipratropium


  (Duoneb)  3 ml  1X  ONCE  7/4/18 01:30


 7/4/18 01:31 DC 7/4/18 01:47


3 ML


 


 Aspirin


  (Children'S


 Aspirin)  324 mg  1X  ONCE  7/4/18 01:30


 7/4/18 01:31 DC 7/4/18 01:46


324 MG


 


 Ceftriaxone


 Sodium 1 gm/


 Sodium Chloride  50 ml @ 


 100 mls/hr  1X  ONCE  7/4/18 02:30


 7/4/18 02:59 UNV  





 


 Diphenhydramine


 HCl


  (Benadryl)  25 mg  1X  ONCE  7/4/18 02:00


 7/4/18 02:01 DC 7/4/18 02:12


25 MG


 


 Famotidine


  (Pepcid Vial)  20 mg  1X  ONCE  7/4/18 02:00


 7/4/18 02:01 DC 7/4/18 02:04


20 MG


 


 Lactated Ringer's  1,000 ml @ 


 100 mls/hr  Q10H  7/4/18 01:30


 7/4/18 11:29  7/4/18 01:50


100 MLS/HR


 


 Methylprednisolone


 Sodium Succinate


  (SOLU-Medrol


 125MG VIAL)  125 mg  1X  ONCE  7/4/18 01:30


 7/4/18 01:31 DC 7/4/18 01:51


125 MG





See Nursing for home





Allergies


Allergies





Allergies








Coded Allergies Type Severity Reaction Last Updated Verified


 


  venom-honey bee Allergy Severe Shortness of Air 5/29/14 Yes


 


  adhesive Allergy Intermediate  2/21/18 Yes


 


  cimetidine Allergy Intermediate "toxic" 7/19/15 Yes


 


  cimetidine HCl Allergy Intermediate "toxic" 7/19/15 Yes











Physical Exam


Physical Exam





Constitutional: in acute distress, non-toxic appearance. []


HENT: Normocephalic, atraumatic, bilateral external ears normal, oropharynx 

moist, no oral exudates, nose normal. []


Eyes: PERRLA, EOMI, conjunctiva normal, no discharge. [] 


Neck: Normal range of motion, no tenderness, supple, no stridor. [] 


Cardiovascular:Heart rate regular rhythm, no murmur []PMI to the left.


Lungs & Thorax:  Bilateral breath sounds equal at apex, few scattered wheezes, 

decreased bases bilaterally on auscultation []


Abdomen: Bowel sounds normal, soft, no tenderness, no masses, no pulsatile 

masses. Old surgery scar.


Skin: Warm, dry, scattered erythemic hives and urticaria


Back: No tenderness, no CVA tenderness. [] 


Extremities: No tenderness, no cyanosis, no clubbing, ROM intact, no edema. 

Bilateral knee scars. No cording appreciated. 


Neurologic: Alert and oriented X 3, normal motor function, normal sensory 

function, no focal deficits noted. []


Psychologic: Affect anxious, judgement normal, mood normal. []





Current Patient Data


Vital Signs





 Vital Signs








  Date Time  Temp Pulse Resp B/P (MAP) Pulse Ox O2 Delivery O2 Flow Rate FiO2


 


7/4/18 02:24  75 20 162/62 (95) 95 Room Air  


 


7/4/18 01:05 98.3       








Lab Results





 Laboratory Tests








Test


 7/4/18


01:13 7/4/18


01:20 7/4/18


01:25


 


Urine Collection Type Unknown    


 


Urine Color Yellow    


 


Urine Clarity Clear    


 


Urine pH 8.0    


 


Urine Specific Gravity 1.015    


 


Urine Protein


 Neg


(NEG-TRACE) 


 





 


Urine Glucose (UA)


 Neg mg/dL


(NEG) 


 





 


Urine Ketones (Stick)


 Neg mg/dL


(NEG) 


 





 


Urine Blood Neg (NEG)    


 


Urine Nitrite Neg (NEG)    


 


Urine Bilirubin Neg (NEG)    


 


Urine Urobilinogen Dipstick


 0.2 mg/dL (0.2


mg/dL) 


 





 


Urine Leukocyte Esterase Small (NEG)    


 


Urine RBC 0 /HPF (0-2)    


 


Urine WBC


 5-10 /HPF


(0-4) 


 





 


Urine Squamous Epithelial


Cells Occ /LPF  


 


 





 


Urine Bacteria


 Few /HPF


(0-FEW) 


 





 


Urine Mucus Slight /LPF    


 


White Blood Count


 


 8.4 x10^3/uL


(4.0-11.0) 





 


Red Blood Count


 


 5.26 x10^6/uL


(3.50-5.40) 





 


Hemoglobin


 


 15.4 g/dL


(12.0-15.5) 





 


Hematocrit


 


 46.6 %


(36.0-47.0) 





 


Mean Corpuscular Volume


 


 89 fL ()


 





 


Mean Corpuscular Hemoglobin  29 pg (25-35)   


 


Mean Corpuscular Hemoglobin


Concent 


 33 g/dL


(31-37) 





 


Red Cell Distribution Width


 


 14.9 %


(11.5-14.5)  H 





 


Platelet Count


 


 301 x10^3/uL


(140-400) 





 


Neutrophils (%) (Auto)  64 % (31-73)   


 


Lymphocytes (%) (Auto)  26 % (24-48)   


 


Monocytes (%) (Auto)  6 % (0-9)   


 


Eosinophils (%) (Auto)  4 % (0-3)  H 


 


Basophils (%) (Auto)  1 % (0-3)   


 


Neutrophils # (Auto)


 


 5.4 x10^3uL


(1.8-7.7) 





 


Lymphocytes # (Auto)


 


 2.1 x10^3/uL


(1.0-4.8) 





 


Monocytes # (Auto)


 


 0.5 x10^3/uL


(0.0-1.1) 





 


Eosinophils # (Auto)


 


 0.3 x10^3/uL


(0.0-0.7) 





 


Basophils # (Auto)


 


 0.1 x10^3/uL


(0.0-0.2) 





 


Erythrocyte Sedimentation Rate  5 (0-25)   


 


Prothrombin Time


 


 9.6 SEC


(9.4-11.4) 





 


Prothrombin Time INR  0.9 (0.9-1.1)   


 


PTT


 


 24 SEC (23-33)


 





 


D-Dimer (Yessenia)


 


 0.83 mg/L


(0.00-0.50)  H 





 


Sodium Level


 


 137 mmol/L


(136-145) 





 


Potassium Level


 


 3.7 mmol/L


(3.5-5.1) 





 


Chloride Level


 


 101 mmol/L


() 





 


Carbon Dioxide Level


 


 26 mmol/L


(21-32) 





 


Anion Gap  10 (6-14)   


 


Blood Urea Nitrogen


 


 9 mg/dL (7-20)


 





 


Creatinine


 


 0.8 mg/dL


(0.6-1.0) 





 


Estimated GFR


(Cockcroft-Gault) 


 72.0  


 





 


Glucose Level


 


 106 mg/dL


(70-99)  H 





 


Calcium Level


 


 8.9 mg/dL


(8.5-10.1) 





 


Magnesium Level


 


 2.2 mg/dL


(1.8-2.4) 





 


Total Bilirubin


 


 0.3 mg/dL


(0.2-1.0) 





 


Direct Bilirubin


 


 0.1 mg/dL


(0.0-0.2) 





 


Aspartate Amino Transferase


(AST) 


 22 U/L (15-37)


 





 


Alanine Aminotransferase (ALT)


 


 28 U/L (14-59)


 





 


Alkaline Phosphatase


 


 69 U/L


() 





 


Creatine Kinase


 


 157 U/L


() 





 


Creatine Kinase MB (Mass)


 


 1.1 ng/mL


(0.0-3.6) 





 


Creatine Kinase MB Relative


Index 


 0.7 % (0-4)  


 





 


Troponin I Quantitative


 


 < 0.017 ng/mL


(0-0.055) 





 


C-Reactive Protein


 


 2.1 mg/L


(0-3.3) 





 


NT-Pro-B-Type Natriuretic


Peptide 


 267 pg/mL


(0-124)  H 





 


Total Protein


 


 7.3 g/dL


(6.4-8.2) 





 


Albumin


 


 3.8 g/dL


(3.4-5.0) 





 


Lipase


 


 138 U/L


() 





 


Urine Opiates Screen  Pos (NEG)   


 


Urine Methadone Screen  Neg (NEG)   


 


Urine Barbiturates  Neg (NEG)   


 


Urine Phencyclidine Screen  Neg (NEG)   


 


Urine


Amphetamine/Methamphetamine 


 Neg (NEG)  


 





 


Urine Benzodiazepines Screen  Neg (NEG)   


 


Urine Cocaine Screen  Neg (NEG)   


 


Urine Cannabinoids Screen  Neg (NEG)   


 


Urine Ethyl Alcohol  Neg (NEG)   


 


Group A Streptococcus Rapid


 


 


 Negative


(NEGATIVE)











EKG


EKG


My interpretation of EKG shows a sinus rhythm at 67 bpm. There is some 

nonspecific left bundle-branch block/interventricular delay. No findings acute 

STEMI with contralateral changes.[]





Radiology/Procedures


Radiology/Procedures


My interpretation chest x-ray shows no free air under the diaphragm. Some 

chronic changes.   Possible hilar adenopathy.  Some linear atelectasis . 

Cardiac Stents are visible on the lateral film. []DJD.  Calcified aorta.  No 

obvious large consolidations.





Course & Med Decision Making


Course & Med Decision Making


Pertinent Labs and Imaging studies reviewed. (See chart for details)





Request for records from [facility]-  pending at time of admit. 





Discussed presentation, testing and tx plan with Dr. Little- will admit for 

further tx. and evaluation.  Pt. symptoms had improved at time of admission, 

but still complained of some dyspnea.  CT and US pending at time of admit. 








[]





Final Impression


Final Impression


1.  Hx. recent Upper Respiratory Infection[]


2. Asthma /COPD exacerbation


3. Hives- Allergic rx.-Urticaria


4. UTI


5. Lt. Bundle Branch Block


6. Chronic Pain


7. Elevated D-dimer


8, Chronic Cough- Asthma, GERD, Inflammatory Disorders.





Dragon Disclaimer


Dragon Disclaimer


This electronic medical record was generated, in whole or in part, using a 

voice recognition dictation system.











JESÚS LEIGH MD Jul 4, 2018 01:13

## 2018-07-04 NOTE — PN
DATE:  2018



HISTORY OF PRESENT ILLNESS:  The patient is a 67-year-old  female

patient who came to the Emergency Room complaining of congestion, hives,

urticaria, pruritus, nausea, dyspnea, fever, malaise, myalgia, fatigue.  She

follows with Dr. Islas, her primary care physician and Dr. Whitt, her

cardiologist.  She was admitted recently to [facility] on 2018.  She

completed Medrol Dosepak 4 days ago and started having nonproductive cough. 

Denied any history of immunosuppression.  Denied any specific ill contact or

recent travel.  She has a history of exacerbation of asthma.  The patient has

not smoked for years.  She quit smoking 15 years ago.  She smoked for 40 years. 

Does not know her best peak flow.  She was evaluated in the Emergency Room

extensively and was admitted with a recent upper respiratory tract infection,

asthma/COPD exacerbation, allergic reaction, UTI, left bundle branch block,

chronic pain syndrome, elevated D-dimer, chronic cough.  She was started on IV

antibiotic, IV fluid as well as steroids and Lovenox as her D-dimer was

elevated.  She underwent CT angiogram, which basically showed no central

pulmonary emboli.  There are some linear opacities at lung bases as well as

nodular opacity within the lingula.  These could be due from atelectasis, but

given that the lingular opacity is nodular in nature, it may be helpful to

obtain a followup exam in a few months to ensure no growth to exclude other

causes such as lung nodes.



PAST MEDICAL HISTORY:  Significant for bronchial asthma/COPD, gastroesophageal

reflux disease, hypertension, hyperlipidemia, left bundle branch block.  She has

coronary artery disease status post PCI with stent deployment x 3.  Her last

stress test was about a year ago.



PAST SURGICAL HISTORY:  Significant for percutaneous coronary intervention with

stent deployment x 3, exploratory laparotomy for endometriosis, tonsillectomy,

left wrist compound fracture, and bilateral total knee arthroplasty.



ALLERGIES:  She is allergic to ADHESIVES, CIMETIDINE, VENOM HONEY BEE.



MEDICATIONS:  She is currently on following medications:  She is on

diphenhydramine 25 mg every 6 hours, cyproheptadine 4 mg 3 times a day,

albuterol sulfate 2 puffs every 4 hours.  She is on Plavix 75 mg once a day,

atorvastatin calcium 10 mg at bedtime, nitroglycerin 0.4 mg sublingual every 5

minutes x 3, metoprolol tartrate 12.5 mg twice a day, aspirin 81 mg once a day,

hydrocodone/APAP 5/325 1-2 tablets twice a day, lorazepam 0.5-1 mg daily, Advair

Diskus 250/50 one puff twice a day.  She is on montelukast for Singulair 10 mg

at bedtime, olopatadine for Patanol 1 drop to each eye twice a day, Flonase 2

sprays to each nostril once a day, Protonix 40 mg once a day.



FAMILY HISTORY:  She has no full brothers or sisters.  Her father  at the

age of 71 because of brain cancer.  Her mother is still alive and has breast

cancer for which she is getting oral chemotherapy and radiation treatment.



SOCIAL HISTORY:  She is  twice.  She has 2 daughters.  She quit smoking

about 15 years ago.  She smoked half to 1 pack a day for almost 40 years.  She

does not drink alcohol or use any recreational drugs.



REVIEW OF SYSTEMS:  The patient denied any blurring of vision, cataract,

glaucoma, or macular degeneration.  Did complain of tinnitus.  Denied any

earache or sensorineural deafness.  Denied any nosebleed, but did complain of

nasal stuffiness and postnasal drip.  Denied any sore throat, sore tongue,

toothache, hoarseness of voice, or difficulty swallowing.  Denied any nausea,

vomiting, diarrhea, or constipation.  Denied any hematemesis, melena, or

hematochezia.  Denied any dysuria, frequency, or hematuria.  Denied any chest

pain.  Did complain of shortness of breath and cough.  No hemoptysis.  Did

complain of dizziness, lightheadedness, but no vertigo.



PHYSICAL EXAMINATION:

GENERAL:  On arrival to the Emergency Room, the patient looked slightly pale,

but no jaundice, cyanosis, or thyromegaly.  No jugular venous distention.  No

limb edema.

VITAL SIGNS:  Her heart rate was 75, blood pressure was 162/62, temperature was

98.3, respiratory rate 22, and oxygen saturation was 95% on room air.

HEAD, EYES, EARS, NOSE AND THROAT:  Showed normocephalic, atraumatic.

NECK:  Supple.

HEART:  Showed normal first and second heart sounds.  No gallop, rub, or murmur.

CHEST:  Clear to auscultation.  No crepitation or rhonchi.

ABDOMEN:  Distended, soft, nontender.  No guarding or rigidity.  No

organomegaly.  All hernial orifices intact.  Bowel sounds normal.

NEUROLOGIC:  She is awake, alert, responding appropriately.  All cranial nerves

intact.  She moves extremities without difficulty.  She ambulates without

assistance or assistive devices.



LABORATORY DATA:  While in the Emergency Room, she had lab work done which

showed a white cell count of 8400, hemoglobin 15, hematocrit 45, MCV 89, and

platelet count of 301,000 with normal manual differential.  Her sed rate was

only 4 mm per hour.  Her chemistry showed a serum sodium of 137, potassium 3.7,

chloride 101, bicarbonate 26, anion gap of 10, BUN 9, creatinine 0.98. 

Estimated GFR was 72 mL per minute.  Her glucose was 106.  Calcium was 8.9. 

Total protein 7.3, albumin 3.8, calcium was 8.9, magnesium 2.2.  Total

bilirubin, AST, ALT, alkaline phosphatase were normal.  Her lipase was 138.  TSH

was 2.436 and C-reactive protein was 2.1 mg/dL.  Her prothrombin time was 9.6,

INR was 0.9, APTT was 24, and D-dimer was 0.83.  Urinalysis showed the urine was

yellow, clear with pH of 8, specific gravity of 1.015.  The urine was negative

for glucose, ketones, blood, nitrite, bilirubin, and dipstick.  Her urine

toxicology screen was positive for opiates, negative for all other medications. 

Her group A Streptococcus rapid test was negative.  Her chest x-ray showed that

there is suspected lingular and left lower lobe atelectasis.  There is no

infiltrate, pleural effusion, or pneumothorax.  The heart is normal in size. 

Given the elevated D-dimer, she underwent CT angio which basically showed that

there is no central pulmonary embolism.  There is some linear opacities at the

lung bases as well as nodular opacity within the lingula.  These could all be

from atelectasis, but given that the lingular opacity is nodular in nature, it

may be helpful to obtain a followup exam in a few months.  Her bilateral lower

extremities Doppler ultrasound showed no evidence of deep vein thrombosis.



PLAN:  My plan is to cut down her Lovenox.  Meanwhile, we will continue with all

other medications.





______________________________

ROSA HAYNES MD



DR:  Alisson  JOB#:  [id number] / [id number]

DD:  2018 15:47  DT:  2018 17:00

## 2018-07-05 VITALS — SYSTOLIC BLOOD PRESSURE: 173 MMHG | DIASTOLIC BLOOD PRESSURE: 68 MMHG

## 2018-07-05 VITALS — DIASTOLIC BLOOD PRESSURE: 81 MMHG | SYSTOLIC BLOOD PRESSURE: 124 MMHG

## 2018-07-05 LAB
% BANDS: 0 % (ref 0–9)
% LYMPHS: 13 % (ref 24–48)
% MONOS: 5 % (ref 0–10)
% SEGS: 81 % (ref 35–66)
ALBUMIN SERPL-MCNC: 3.1 G/DL (ref 3.4–5)
ALBUMIN/GLOB SERPL: 1 {RATIO} (ref 1–1.7)
ALP SERPL-CCNC: 58 U/L (ref 46–116)
ALT SERPL-CCNC: 11 U/L (ref 14–59)
ANION GAP SERPL CALC-SCNC: 8 MMOL/L (ref 6–14)
AST SERPL-CCNC: 5 U/L (ref 15–37)
BASOPHILS # BLD AUTO: 0 X10^3/UL (ref 0–0.2)
BASOPHILS NFR BLD AUTO: 0 % (ref 0–3)
BASOPHILS NFR BLD: 0 % (ref 0–3)
BILIRUB SERPL-MCNC: 0.2 MG/DL (ref 0.2–1)
BUN/CREAT SERPL: 13 (ref 6–20)
CA-I SERPL ISE-MCNC: 9 MG/DL (ref 7–20)
CALCIUM SERPL-MCNC: 8.5 MG/DL (ref 8.5–10.1)
CHLORIDE SERPL-SCNC: 107 MMOL/L (ref 98–107)
CO2 SERPL-SCNC: 27 MMOL/L (ref 21–32)
CREAT SERPL-MCNC: 0.7 MG/DL (ref 0.6–1)
EOSINOPHIL NFR BLD AUTO: 1 % (ref 0–5)
EOSINOPHIL NFR BLD: 0 % (ref 0–3)
EOSINOPHIL NFR BLD: 0 X10^3/UL (ref 0–0.7)
ERYTHROCYTE [DISTWIDTH] IN BLOOD BY AUTOMATED COUNT: 15.1 % (ref 11.5–14.5)
GFR SERPLBLD BASED ON 1.73 SQ M-ARVRAT: 84 ML/MIN
GLOBULIN SER-MCNC: 3.1 G/DL (ref 2.2–3.8)
GLUCOSE SERPL-MCNC: 137 MG/DL (ref 70–99)
HCT VFR BLD CALC: 41.2 % (ref 36–47)
HGB BLD-MCNC: 13.4 G/DL (ref 12–15.5)
LYMPHOCYTES # BLD: 1.9 X10^3/UL (ref 1–4.8)
LYMPHOCYTES NFR BLD AUTO: 13 % (ref 24–48)
MCH RBC QN AUTO: 29 PG (ref 25–35)
MCHC RBC AUTO-ENTMCNC: 33 G/DL (ref 31–37)
MCV RBC AUTO: 89 FL (ref 79–100)
MONO #: 0.7 X10^3/UL (ref 0–1.1)
MONOCYTES NFR BLD: 5 % (ref 0–9)
NEUT #: 11.9 X10^3UL (ref 1.8–7.7)
NEUTROPHILS NFR BLD AUTO: 82 % (ref 31–73)
PLATELET # BLD AUTO: 259 X10^3/UL (ref 140–400)
PLATELET # BLD EST: ADEQUATE 10*3/UL
POTASSIUM SERPL-SCNC: 3.9 MMOL/L (ref 3.5–5.1)
PROT SERPL-MCNC: 6.2 G/DL (ref 6.4–8.2)
RBC # BLD AUTO: 4.63 X10^6/UL (ref 3.5–5.4)
SODIUM SERPL-SCNC: 142 MMOL/L (ref 136–145)
WBC # BLD AUTO: 14.6 X10^3/UL (ref 4–11)

## 2018-07-05 RX ADMIN — SODIUM CHLORIDE, SODIUM LACTATE, POTASSIUM CHLORIDE, AND CALCIUM CHLORIDE SCH MLS/HR: .6; .31; .03; .02 INJECTION, SOLUTION INTRAVENOUS at 00:45

## 2018-07-05 RX ADMIN — IPRATROPIUM BROMIDE AND ALBUTEROL SULFATE SCH ML: .5; 3 SOLUTION RESPIRATORY (INHALATION) at 05:58

## 2018-07-05 RX ADMIN — SODIUM CHLORIDE, SODIUM LACTATE, POTASSIUM CHLORIDE, AND CALCIUM CHLORIDE SCH MLS/HR: .6; .31; .03; .02 INJECTION, SOLUTION INTRAVENOUS at 06:59

## 2018-07-05 RX ADMIN — IPRATROPIUM BROMIDE AND ALBUTEROL SULFATE SCH ML: .5; 3 SOLUTION RESPIRATORY (INHALATION) at 11:15

## 2018-07-05 RX ADMIN — FAMOTIDINE SCH MG: 20 TABLET ORAL at 09:30

## 2018-07-05 RX ADMIN — Medication SCH CAP: at 09:29

## 2018-07-05 RX ADMIN — METHYLPREDNISOLONE SODIUM SUCCINATE SCH MG: 40 INJECTION, POWDER, FOR SOLUTION INTRAMUSCULAR; INTRAVENOUS at 09:29

## 2018-07-06 NOTE — HP
ADMIT DATE:  2018



HISTORY OF PRESENT ILLNESS:  The patient is a 67-year-old  female

patient who came to the Emergency Room complaining of congestion, hives,

urticaria, pruritus, nausea, dyspnea, fever, malaise, myalgia, fatigue.  She

follows with Dr. Islas, her primary care physician and Dr. Whitt, her

cardiologist.  She was admitted recently to [facility] on 2018.  She

completed Medrol Dosepak 4 days ago and started having nonproductive cough. 

Denied any history of immunosuppression.  Denied any specific ill contact or

recent travel.  She has a history of exacerbation of asthma.  The patient has

not smoked for years.  She quit smoking 15 years ago.  She smoked for 40 years. 

Does not know her best peak flow.  She was evaluated in the Emergency Room

extensively and was admitted with a recent upper respiratory tract infection,

asthma/COPD exacerbation, allergic reaction, UTI, left bundle branch block,

chronic pain syndrome, elevated D-dimer, chronic cough.  She was started on IV

antibiotic, IV fluid as well as steroids and Lovenox as her D-dimer was

elevated.  She underwent CT angiogram, which basically showed no central

pulmonary emboli.  There are some linear opacities at lung bases as well as

nodular opacity within the lingula.  These could be due from atelectasis, but

given that the lingular opacity is nodular in nature, it may be helpful to

obtain a followup exam in a few months to ensure no growth to exclude other

causes such as lung nodes.



PAST MEDICAL HISTORY:  Significant for bronchial asthma/COPD, gastroesophageal

reflux disease, hypertension, hyperlipidemia, left bundle branch block.  She has

coronary artery disease status post PCI with stent deployment x 3.  Her last

stress test was about a year ago.



PAST SURGICAL HISTORY:  Significant for percutaneous coronary intervention with

stent deployment x 3, exploratory laparotomy for endometriosis, tonsillectomy,

left wrist compound fracture, and bilateral total knee arthroplasty.



ALLERGIES:  She is allergic to ADHESIVES, CIMETIDINE, VENOM HONEY BEE.



MEDICATIONS:  She is currently on following medications:  She is on

diphenhydramine 25 mg every 6 hours, cyproheptadine 4 mg 3 times a day,

albuterol sulfate 2 puffs every 4 hours.  She is on Plavix 75 mg once a day,

atorvastatin calcium 10 mg at bedtime, nitroglycerin 0.4 mg sublingual every 5

minutes x 3, metoprolol tartrate 12.5 mg twice a day, aspirin 81 mg once a day,

hydrocodone/APAP 5/325 1-2 tablets twice a day, lorazepam 0.5-1 mg daily, Advair

Diskus 250/50 one puff twice a day.  She is on montelukast for Singulair 10 mg

at bedtime, olopatadine for Patanol 1 drop to each eye twice a day, Flonase 2

sprays to each nostril once a day, Protonix 40 mg once a day.



FAMILY HISTORY:  She has no full brothers or sisters.  Her father  at the

age of 71 because of brain cancer.  Her mother is still alive and has breast

cancer for which she is getting oral chemotherapy and radiation treatment.



SOCIAL HISTORY:  She is  twice.  She has 2 daughters.  She quit smoking

about 15 years ago.  She smoked half to 1 pack a day for almost 40 years.  She

does not drink alcohol or use any recreational drugs.



REVIEW OF SYSTEMS:  The patient denied any blurring of vision, cataract,

glaucoma, or macular degeneration.  Did complain of tinnitus.  Denied any

earache or sensorineural deafness.  Denied any nosebleed, but did complain of

nasal stuffiness and postnasal drip.  Denied any sore throat, sore tongue,

toothache, hoarseness of voice, or difficulty swallowing.  Denied any nausea,

vomiting, diarrhea, or constipation.  Denied any hematemesis, melena, or

hematochezia.  Denied any dysuria, frequency, or hematuria.  Denied any chest

pain.  Did complain of shortness of breath and cough.  No hemoptysis.  Did

complain of dizziness, lightheadedness, but no vertigo.



PHYSICAL EXAMINATION:

GENERAL:  On arrival to the Emergency Room, the patient looked slightly pale,

but no jaundice, cyanosis, or thyromegaly.  No jugular venous distention.  No

limb edema.

VITAL SIGNS:  Her heart rate was 75, blood pressure was 162/62, temperature was

98.3, respiratory rate 22, and oxygen saturation was 95% on room air.

HEAD, EYES, EARS, NOSE AND THROAT:  Showed normocephalic, atraumatic.

NECK:  Supple.

HEART:  Showed normal first and second heart sounds.  No gallop, rub, or murmur.

CHEST:  Clear to auscultation.  No crepitation or rhonchi.

ABDOMEN:  Distended, soft, nontender.  No guarding or rigidity.  No

organomegaly.  All hernial orifices intact.  Bowel sounds normal.

NEUROLOGIC:  She is awake, alert, responding appropriately.  All cranial nerves

intact.  She moves extremities without difficulty.  She ambulates without

assistance or assistive devices.



LABORATORY DATA:  While in the Emergency Room, she had lab work done which

showed a white cell count of 8400, hemoglobin 15, hematocrit 45, MCV 89, and

platelet count of 301,000 with normal manual differential.  Her sed rate was

only 4 mm per hour.  Her chemistry showed a serum sodium of 137, potassium 3.7,

chloride 101, bicarbonate 26, anion gap of 10, BUN 9, creatinine 0.98. 

Estimated GFR was 72 mL per minute.  Her glucose was 106.  Calcium was 8.9. 

Total protein 7.3, albumin 3.8, calcium was 8.9, magnesium 2.2.  Total

bilirubin, AST, ALT, alkaline phosphatase were normal.  Her lipase was 138.  TSH

was 2.436 and C-reactive protein was 2.1 mg/dL.  Her prothrombin time was 9.6,

INR was 0.9, APTT was 24, and D-dimer was 0.83.  Urinalysis showed the urine was

yellow, clear with pH of 8, specific gravity of 1.015.  The urine was negative

for glucose, ketones, blood, nitrite, bilirubin, and dipstick.  Her urine

toxicology screen was positive for opiates, negative for all other medications. 

Her group A Streptococcus rapid test was negative.  Her chest x-ray showed that

there is suspected lingular and left lower lobe atelectasis.  There is no

infiltrate, pleural effusion, or pneumothorax.  The heart is normal in size. 

Given the elevated D-dimer, she underwent CT angio which basically showed that

there is no central pulmonary embolism.  There is some linear opacities at the

lung bases as well as nodular opacity within the lingula.  These could all be

from atelectasis, but given that the lingular opacity is nodular in nature, it

may be helpful to obtain a followup exam in a few months.  Her bilateral lower

extremities Doppler ultrasound showed no evidence of deep vein thrombosis.



PLAN:  My plan is to cut down her Lovenox.  Meanwhile, we will continue with all

other medications.





______________________________

ROSA HAYNES MD



DR:  Alisson  JOB#:  [id number] / [id number]

DD:  2018 15:47  DT:  2018 17:00

## 2018-07-09 LAB
ANCA AB SER QL IF: (no result) TITER
P ANCA: (no result) TITER

## 2018-07-31 ENCOUNTER — HOSPITAL ENCOUNTER (EMERGENCY)
Dept: HOSPITAL 63 - ER | Age: 68
Discharge: HOME | End: 2018-07-31
Payer: COMMERCIAL

## 2018-07-31 VITALS — DIASTOLIC BLOOD PRESSURE: 75 MMHG | SYSTOLIC BLOOD PRESSURE: 147 MMHG
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
## 2018-07-31 DIAGNOSIS — R07.89: ICD-10-CM

## 2018-07-31 DIAGNOSIS — Z91.030: ICD-10-CM
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
## 2018-07-31 DIAGNOSIS — F41.9: Primary | ICD-10-CM

## 2018-07-31 DIAGNOSIS — Z88.8: ICD-10-CM

## 2018-07-31 DIAGNOSIS — F32.9: ICD-10-CM

## 2018-07-31 DIAGNOSIS — R42: ICD-10-CM

## 2018-07-31 DIAGNOSIS — R29.6: ICD-10-CM

## 2018-07-31 DIAGNOSIS — K21.9: ICD-10-CM

## 2018-07-31 DIAGNOSIS — Z87.891: ICD-10-CM

## 2018-07-31 DIAGNOSIS — I10: ICD-10-CM

## 2018-07-31 DIAGNOSIS — J44.9: ICD-10-CM
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
## 2018-07-31 DIAGNOSIS — Z98.61: ICD-10-CM

## 2018-07-31 LAB
ALBUMIN SERPL-MCNC: 3.5 G/DL (ref 3.4–5)
ALBUMIN/GLOB SERPL: 1 {RATIO} (ref 1–1.7)
ALP SERPL-CCNC: 66 U/L (ref 46–116)
ALT SERPL-CCNC: 47 U/L (ref 14–59)
ANION GAP SERPL CALC-SCNC: 6 MMOL/L (ref 6–14)
APTT PPP: YELLOW S
AST SERPL-CCNC: 30 U/L (ref 15–37)
BACTERIA #/AREA URNS HPF: (no result) /HPF
BASOPHILS # BLD AUTO: 0.1 X10^3/UL (ref 0–0.2)
BASOPHILS NFR BLD: 1 % (ref 0–3)
BILIRUB SERPL-MCNC: 0.5 MG/DL (ref 0.2–1)
BILIRUB UR QL STRIP: (no result)
BUN/CREAT SERPL: 15 (ref 6–20)
CA-I SERPL ISE-MCNC: 12 MG/DL (ref 7–20)
CALCIUM SERPL-MCNC: 9.2 MG/DL (ref 8.5–10.1)
CHLORIDE SERPL-SCNC: 105 MMOL/L (ref 98–107)
CO2 SERPL-SCNC: 29 MMOL/L (ref 21–32)
CREAT SERPL-MCNC: 0.8 MG/DL (ref 0.6–1)
EOSINOPHIL NFR BLD: 0.5 X10^3/UL (ref 0–0.7)
EOSINOPHIL NFR BLD: 9 % (ref 0–3)
ERYTHROCYTE [DISTWIDTH] IN BLOOD BY AUTOMATED COUNT: 14.5 % (ref 11.5–14.5)
FIBRINOGEN PPP-MCNC: CLEAR MG/DL
GFR SERPLBLD BASED ON 1.73 SQ M-ARVRAT: 72 ML/MIN
GLOBULIN SER-MCNC: 3.4 G/DL (ref 2.2–3.8)
GLUCOSE SERPL-MCNC: 102 MG/DL (ref 70–99)
GLUCOSE UR STRIP-MCNC: (no result) MG/DL
HCT VFR BLD CALC: 43.5 % (ref 36–47)
HGB BLD-MCNC: 14.7 G/DL (ref 12–15.5)
LYMPHOCYTES # BLD: 1.4 X10^3/UL (ref 1–4.8)
LYMPHOCYTES NFR BLD AUTO: 23 % (ref 24–48)
MAGNESIUM SERPL-MCNC: 2.3 MG/DL (ref 1.8–2.4)
MCH RBC QN AUTO: 30 PG (ref 25–35)
MCHC RBC AUTO-ENTMCNC: 34 G/DL (ref 31–37)
MCV RBC AUTO: 87 FL (ref 79–100)
MONO #: 0.6 X10^3/UL (ref 0–1.1)
MONOCYTES NFR BLD: 11 % (ref 0–9)
NEUT #: 3.3 X10^3UL (ref 1.8–7.7)
NEUTROPHILS NFR BLD AUTO: 56 % (ref 31–73)
NITRITE UR QL STRIP: (no result)
PLATELET # BLD AUTO: 272 X10^3/UL (ref 140–400)
POTASSIUM SERPL-SCNC: 3.6 MMOL/L (ref 3.5–5.1)
PROT SERPL-MCNC: 6.9 G/DL (ref 6.4–8.2)
RBC # BLD AUTO: 4.99 X10^6/UL (ref 3.5–5.4)
RBC #/AREA URNS HPF: (no result) /HPF (ref 0–2)
SODIUM SERPL-SCNC: 140 MMOL/L (ref 136–145)
SP GR UR STRIP: 1.02
SQUAMOUS #/AREA URNS LPF: (no result) /LPF
UROBILINOGEN UR-MCNC: 0.2 MG/DL
WBC # BLD AUTO: 5.9 X10^3/UL (ref 4–11)
WBC #/AREA URNS HPF: (no result) /HPF (ref 0–4)

## 2018-07-31 PROCEDURE — 70450 CT HEAD/BRAIN W/O DYE: CPT

## 2018-07-31 PROCEDURE — 36415 COLL VENOUS BLD VENIPUNCTURE: CPT

## 2018-07-31 PROCEDURE — 85610 PROTHROMBIN TIME: CPT

## 2018-07-31 PROCEDURE — 87086 URINE CULTURE/COLONY COUNT: CPT

## 2018-07-31 PROCEDURE — 83735 ASSAY OF MAGNESIUM: CPT

## 2018-07-31 PROCEDURE — 99285 EMERGENCY DEPT VISIT HI MDM: CPT

## 2018-07-31 PROCEDURE — 96374 THER/PROPH/DIAG INJ IV PUSH: CPT

## 2018-07-31 PROCEDURE — 85025 COMPLETE CBC W/AUTO DIFF WBC: CPT

## 2018-07-31 PROCEDURE — 84484 ASSAY OF TROPONIN QUANT: CPT

## 2018-07-31 PROCEDURE — 81001 URINALYSIS AUTO W/SCOPE: CPT

## 2018-07-31 PROCEDURE — 71045 X-RAY EXAM CHEST 1 VIEW: CPT

## 2018-07-31 PROCEDURE — 80053 COMPREHEN METABOLIC PANEL: CPT

## 2018-07-31 PROCEDURE — 93005 ELECTROCARDIOGRAM TRACING: CPT

## 2018-07-31 PROCEDURE — 82553 CREATINE MB FRACTION: CPT

## 2018-07-31 PROCEDURE — 83880 ASSAY OF NATRIURETIC PEPTIDE: CPT

## 2018-07-31 NOTE — PHYS DOC
Past History


Past Medical History:  Anxiety, Asthma, Bronchitis, COPD, Depression, GERD, 

Hypertension, IBS, Other


Past Surgical History:  Angioplasty, Hysterectomy, Knee Replacement, Other


Smoking:  Non-smoker, Quit Greater Than 1 Year


Alcohol Use:  Rarely


Drug Use:  None





Adult General


Chief Complaint


Chief Complaint:  SHORTNESS OF BREATH





[facility]


HPI





67-year-old female patient being of several medical problem. Patient 

complaining of intermittent episodes of dizziness with a standing up for one 

week and episodes of fall because of dizziness since yesterday. Patient states 

she had 3 episodes of fall today without loss of consciousness or injury to her 

head. Patient also complaining of episodes of substernal chest pain for the 

last 2 days as a aching pain in substernal area without radiation. Patient 

complaining of intermittent episodes of shortness of breath and palpitation and 

lightheadedness. Patient also complaining of episodes of diarrhea and states 

she had 3 episodes of diarrhea yesterday and 3 episodes of diarrhea today. She 

denies fever and chills, cough and congestion, focal neuro deficit.





Review of Systems


Review of Systems





Constitutional: Denies fever or chills []


Eyes: Denies change in visual acuity, redness, or eye pain []


HENT: Denies nasal congestion or sore throat []


Respiratory: Reports shortness of breath []


Cardiovascular: No additional information not addressed in HPI []


GI: Denies abdominal pain, nausea, vomiting, bloody stools or diarrhea []


: Denies dysuria or hematuria []


Musculoskeletal: Denies back pain or joint pain []


Integument: Denies rash or skin lesions []


Neurologic: Denies headache, focal weakness or sensory changes, [reports 

dizziness]


Endocrine: Denies polyuria or polydipsia []





All other systems were reviewed and found to be within normal limits, except as 

documented in this note.





Current Medications


Current Medications





Current Medications








 Medications


  (Trade)  Dose


 Ordered  Sig/Mar  Start Time


 Stop Time Status Last Admin


Dose Admin


 


 Lorazepam


  (Ativan)  1 mg  1X  ONCE  18 15:15


 18 15:16 DC  














Allergies


Allergies





Allergies








Coded Allergies Type Severity Reaction Last Updated Verified


 


  venom-honey bee Allergy Severe Shortness of Air 14 Yes


 


  adhesive Allergy Intermediate  18 Yes


 


  cimetidine Allergy Intermediate "toxic" 7/19/15 Yes


 


  cimetidine HCl Allergy Intermediate "toxic" 7/19/15 Yes











Physical Exam


Physical Exam





Constitutional: Well developed, well nourished, mild distress, non-toxic 

appearance. []


HENT: Normocephalic, atraumatic, bilateral external ears normal, oropharynx 

moist, no oral exudates, nose normal. []


Eyes: PERRLA, EOMI, conjunctiva normal, no discharge. [] 


Neck: Normal range of motion, no tenderness, supple, no stridor. [] 


Cardiovascular:Heart rate regular rhythm, no murmur []


Lungs & Thorax:  Bilateral breath sounds clear to auscultation []


Abdomen: Bowel sounds normal, soft, no tenderness, no masses, no pulsatile 

masses. [] 


Skin: Warm, dry, no erythema, no rash. [] 


Back: No tenderness, no CVA tenderness. [] 


Extremities: No tenderness, no cyanosis, no clubbing, ROM intact, no edema. [] 


Neurologic: Alert and oriented X 3, normal motor function, normal sensory 

function, no focal deficits noted, NIH scale of 0


Psychologic: Affect anxious, judgement normal, mood normal. []





Current Patient Data


Vital Signs





 Vital Signs








  Date Time  Temp Pulse Resp B/P (MAP) Pulse Ox O2 Delivery O2 Flow Rate FiO2


 


18 14:30 97.7 73 24  94 Room Air  








Lab Results





 Laboratory Tests








Test


 18


15:15


 


White Blood Count


 5.9 x10^3/uL


(4.0-11.0)


 


Red Blood Count


 4.99 x10^6/uL


(3.50-5.40)


 


Hemoglobin


 14.7 g/dL


(12.0-15.5)


 


Hematocrit


 43.5 %


(36.0-47.0)


 


Mean Corpuscular Volume


 87 fL ()





 


Mean Corpuscular Hemoglobin 30 pg (25-35)  


 


Mean Corpuscular Hemoglobin


Concent 34 g/dL


(31-37)


 


Red Cell Distribution Width


 14.5 %


(11.5-14.5)


 


Platelet Count


 272 x10^3/uL


(140-400)


 


Neutrophils (%) (Auto) 56 % (31-73)  


 


Lymphocytes (%) (Auto) 23 % (24-48)  L


 


Monocytes (%) (Auto) 11 % (0-9)  H


 


Eosinophils (%) (Auto) 9 % (0-3)  H


 


Basophils (%) (Auto) 1 % (0-3)  


 


Neutrophils # (Auto)


 3.3 x10^3uL


(1.8-7.7)


 


Lymphocytes # (Auto)


 1.4 x10^3/uL


(1.0-4.8)


 


Monocytes # (Auto)


 0.6 x10^3/uL


(0.0-1.1)


 


Eosinophils # (Auto)


 0.5 x10^3/uL


(0.0-0.7)


 


Basophils # (Auto)


 0.1 x10^3/uL


(0.0-0.2)











EKG


EKG


EKG interpreted by me. EKG at 1536 showed normal sinus rhythm at rate of 70, 

left fourth axis, left bundle branch block,, no acute ST and T-wave 

abnormalities[]





Radiology/Procedures


Radiology/Procedures


[][facility] [address], [place] [address] [phone]


 


 IMAGING REPORT





 Signed





PATIENT: PARISH RICO ACCOUNT: [id number] MRN#: [id number]


: 1950 LOCATION: ER AGE: 67


SEX: F EXAM DT: 18 ACCESSION#: [id number]


STATUS: REG ER ORD. PHYSICIAN: HARDEEP PEDRO MD 


REASON: dizziness and chest pain


PROCEDURE: PORTABLE CHEST 1V





 


Single view of the chest. 2018 3:24 PM


 


Indication: CHEST PAIN AND DIZZY


 


Comparison: Chest radiograph 2018


 


Findings: Mild interstitial coarsening is stable from prior study. No 


pneumothorax or pleural effusion is identified. Heart size is within 


normal limits. No focal infiltrate is seen. Coronary stents noted. No 


acute osseous changes are identified.


 


IMPRESSION: No evidence of acute cardiopulmonary process is identified.


 


Electronically signed by: Marcelo Frank MD (2018 3:30 PM) [id number]














DICTATED AND SIGNED BY:     MARCELO FRANK MD


DATE:     18 1529





CC: PB SAENZ MD; HARDEEP PEDRO MD ~


 [facility] [address], [place] [address] [phone]


 


 IMAGING REPORT





 Signed





PATIENT: PARISH RICO ACCOUNT: [id number] MRN#: [id number]


: 1950 LOCATION: ER AGE: 67


SEX: F EXAM DT: 18 ACCESSION#: [id number]


STATUS: REG ER ORD. PHYSICIAN: HARDEEP PEDRO MD 


REASON: dizziness and chest pain


PROCEDURE: CT HEAD WO CONTRAST





 


CT HEAD


 


INDICATION: FREQUENT FALLS, HEADACHE AND DIZZINESS


 


COMPARISON: None Available.


 


TECHNIQUE: 5 mm contiguous axial images were obtained from the skull base 


to the vertex in both bone and soft tissue algorithm.


 


Exposure: One or more of the following individualized dose reduction 


techniques were utilized for this examination:  1. Automated exposure 


control  2. Adjustment of the mA and/or kV according to patient size  3. 


Use of iterative reconstruction technique  


 


FINDINGS: 


 


No abnormal attenuation within the brain parenchyma.  


 


No evidence of acute intracranial hemorrhage.   No extra-axial fluid 


collections.


 


No mass effect or midline shift. Ventricular size is appropriate.  Basal 


cisterns are patent.


 


No fractures identified.Gray-white differentiation is preserved.Globes and


orbits are within normal limits.   Mild mucosal thickening identified in 


the maxillary sinuses   


 


IMPRESSION:


 


No acute intracranial findings. 


 


Electronically signed by: Darrion Morel MD (2018 3:44 PM) [id number]














DICTATED AND SIGNED BY:     DARRION MOREL MD


DATE:     18 1536





CC: PB SAENZ MD; HARDEEP PEDRO MD ~





Course & Med Decision Making


Course & Med Decision Making


Pertinent Labs and Imaging studies reviewed. (See chart for details)


Evaluation of patient in ER showed 67-year-old female patient with multiple 

complaints including dizziness, shortness of breath, frequent falls, and 

intermittent episodes of chest pain for several days. Patient had unremarkable 

physical exam and labs and CT head and chest x-ray. She treated with Ativan and 

felt better. Patient was interpreted without problem. Patient psychiatric to 

follow up with her primary care physician regarding dizziness and increase 

fluid intake.





Dragon Disclaimer


Dragon Disclaimer


This electronic medical record was generated, in whole or in part, using a 

voice recognition dictation system.





Departure


Departure:


Impression:  


 Primary Impression:  


 Anxiety


 Additional Impressions:  


 Dizziness


 Fall at home


 Chest pain


Disposition:   HOME, SELF-CARE (At 1627)


Condition:  IMPROVED


Referrals:  


PB SAENZ MD (PCP)


Patient Instructions:  Anxiety and Panic Attacks, Chest Pain (Nonspecific), 

Dizziness, Fall Prevention and Home Safety





Additional Instructions:  


Drink plenty of liquids


Follow-up with your primary care physician in 3-5 days


Return to ER if not getting better





Problem Qualifiers











HARDEEP PEDRO MD 2018 15:45

## 2018-07-31 NOTE — RAD
Single view of the chest. 7/31/2018 3:24 PM

 

Indication: CHEST PAIN AND DIZZY

 

Comparison: Chest radiograph July 4, 2018

 

Findings: Mild interstitial coarsening is stable from prior study. No 

pneumothorax or pleural effusion is identified. Heart size is within 

normal limits. No focal infiltrate is seen. Coronary stents noted. No 

acute osseous changes are identified.

 

IMPRESSION: No evidence of acute cardiopulmonary process is identified.

 

Electronically signed by: Marcelo Shahid MD (7/31/2018 3:30 PM) [id number]

## 2018-07-31 NOTE — RAD
CT HEAD

 

INDICATION: FREQUENT FALLS, HEADACHE AND DIZZINESS

 

COMPARISON: None Available.

 

TECHNIQUE: 5 mm contiguous axial images were obtained from the skull base 

to the vertex in both bone and soft tissue algorithm.

 

Exposure: One or more of the following individualized dose reduction 

techniques were utilized for this examination:  1. Automated exposure 

control  2. Adjustment of the mA and/or kV according to patient size  3. 

Use of iterative reconstruction technique  

 

FINDINGS: 

 

No abnormal attenuation within the brain parenchyma.  

 

No evidence of acute intracranial hemorrhage.   No extra-axial fluid 

collections.

 

No mass effect or midline shift. Ventricular size is appropriate.  Basal 

cisterns are patent.

 

No fractures identified.Gray-white differentiation is preserved.Globes and

orbits are within normal limits.   Mild mucosal thickening identified in 

the maxillary sinuses   

 

IMPRESSION:

 

No acute intracranial findings. 

 

Electronically signed by: Darrion Antonio MD (7/31/2018 3:44 PM) [id number]

## 2018-07-31 NOTE — EKG
[facility] [address], [place] [address]

Test Date:    2018               Test Time:    15:36:25

Pat Name:     PARISH RICO      Department:   

Patient ID:   [id number]           Room:          

Gender:       F                        Technician:   

:          1950               Requested By: HARDEEP PEDRO

Order Number: [id number]            Reading MD:     

                                 Measurements

Intervals                              Axis          

Rate:         70                       P:            45

NJ:           170                      QRS:          -5

QRSD:         122                      T:            90

QT:           432                                    

QTc:          470                                    

                           Interpretive Statements

SINUS RHYTHM

LEFTWARD AXIS

QRS(T) CONTOUR ABNORMALITY

CONSISTENT WITH ANTEROSEPTAL INFARCT

POSSIBLY RECENT

T ABNORMALITY IN HIGH LATERAL LEADS

ABNORMAL ECG

RI6.01          Unconfirmed report

No previous ECG available for comparison

## 2018-11-16 NOTE — DS
DATE OF DISCHARGE:  02/23/2018



DISCHARGE DIAGNOSES:

1.  Sepsis with urinary tract infection.  Culture is pending.

2.  Acute exacerbation of chronic obstructive pulmonary disease.

3.  Hypoxic respiratory failure.



HOSPITAL COURSE:  A 67-year-old was admitted for the above problems.  She was

treated with IV steroids, IV antibiotics.  ____her VQ scan was negative for PE. 

She improved during her hospital stay and was ready for discharge on 02/23/2018.

 Blood pressure 99/56, temperature 97.3, pulse 88, respirations 20, pulse ox was

91% to 93% on room air.



DISPOSITION:  To home.  She received Zithromax and ceftriaxone in the hospital. 

We will give her 1 dose of fosfomycin prior to discharge.





______________________________

NEELA YANEZ DO



DR:  SILVIA/greg  JOB#:  [id number] / [id number]

DD:  02/23/2018 12:57  DT:  02/23/2018 21:42
none

## 2019-09-07 ENCOUNTER — HOSPITAL ENCOUNTER (EMERGENCY)
Dept: HOSPITAL 63 - ER | Age: 69
Discharge: HOME | End: 2019-09-07
Payer: COMMERCIAL

## 2019-09-07 VITALS — WEIGHT: 165 LBS | BODY MASS INDEX: 32.39 KG/M2 | HEIGHT: 60 IN

## 2019-09-07 VITALS — DIASTOLIC BLOOD PRESSURE: 79 MMHG | SYSTOLIC BLOOD PRESSURE: 145 MMHG

## 2019-09-07 DIAGNOSIS — Z88.8: ICD-10-CM

## 2019-09-07 DIAGNOSIS — K21.9: ICD-10-CM

## 2019-09-07 DIAGNOSIS — E87.6: ICD-10-CM

## 2019-09-07 DIAGNOSIS — M19.90: ICD-10-CM

## 2019-09-07 DIAGNOSIS — Z87.891: ICD-10-CM

## 2019-09-07 DIAGNOSIS — R79.89: ICD-10-CM

## 2019-09-07 DIAGNOSIS — M43.6: ICD-10-CM

## 2019-09-07 DIAGNOSIS — J44.1: Primary | ICD-10-CM

## 2019-09-07 DIAGNOSIS — M62.838: ICD-10-CM

## 2019-09-07 DIAGNOSIS — Z91.030: ICD-10-CM

## 2019-09-07 DIAGNOSIS — I10: ICD-10-CM

## 2019-09-07 LAB
ALBUMIN SERPL-MCNC: 3.7 G/DL (ref 3.4–5)
ALP SERPL-CCNC: 52 U/L (ref 46–116)
ALT SERPL-CCNC: 15 U/L (ref 14–59)
AMPHETAMINE/METHAMPHETAMINE: (no result)
ANION GAP SERPL CALC-SCNC: 13 MMOL/L (ref 6–14)
APTT PPP: (no result) S
AST SERPL-CCNC: 18 U/L (ref 15–37)
BACTERIA #/AREA URNS HPF: (no result) /HPF
BARBITURATES UR-MCNC: (no result) UG/ML
BASOPHILS # BLD AUTO: 0 X10^3/UL (ref 0–0.2)
BASOPHILS NFR BLD: 0 % (ref 0–3)
BENZODIAZ UR-MCNC: (no result) UG/L
BILIRUB DIRECT SERPL-MCNC: 0.2 MG/DL (ref 0–0.2)
BILIRUB SERPL-MCNC: 0.7 MG/DL (ref 0.2–1)
BILIRUB UR QL STRIP: (no result)
CA-I SERPL ISE-MCNC: 5 MG/DL (ref 7–20)
CALCIUM SERPL-MCNC: 8.8 MG/DL (ref 8.5–10.1)
CANNABINOIDS UR-MCNC: (no result) UG/L
CHLORIDE SERPL-SCNC: 104 MMOL/L (ref 98–107)
CO2 SERPL-SCNC: 22 MMOL/L (ref 21–32)
COCAINE UR-MCNC: (no result) NG/ML
CREAT SERPL-MCNC: 0.8 MG/DL (ref 0.6–1)
EOSINOPHIL NFR BLD: 0.3 X10^3/UL (ref 0–0.7)
EOSINOPHIL NFR BLD: 4 % (ref 0–3)
ERYTHROCYTE [DISTWIDTH] IN BLOOD BY AUTOMATED COUNT: 14.1 % (ref 11.5–14.5)
FIBRINOGEN PPP-MCNC: (no result) MG/DL
GFR SERPLBLD BASED ON 1.73 SQ M-ARVRAT: 71.8 ML/MIN
GLUCOSE SERPL-MCNC: 125 MG/DL (ref 70–99)
GLUCOSE UR STRIP-MCNC: (no result) MG/DL
HCT VFR BLD CALC: 44 % (ref 36–47)
HGB BLD-MCNC: 14.6 G/DL (ref 12–15.5)
LIPASE: 166 U/L (ref 73–393)
LYMPHOCYTES # BLD: 2.6 X10^3/UL (ref 1–4.8)
LYMPHOCYTES NFR BLD AUTO: 30 % (ref 24–48)
MAGNESIUM SERPL-MCNC: 2.1 MG/DL (ref 1.8–2.4)
MCH RBC QN AUTO: 30 PG (ref 25–35)
MCHC RBC AUTO-ENTMCNC: 33 G/DL (ref 31–37)
MCV RBC AUTO: 90 FL (ref 79–100)
METHADONE SERPL-MCNC: (no result) NG/ML
MONO #: 0.8 X10^3/UL (ref 0–1.1)
MONOCYTES NFR BLD: 9 % (ref 0–9)
NEUT #: 5.1 X10^3UL (ref 1.8–7.7)
NEUTROPHILS NFR BLD AUTO: 57 % (ref 31–73)
NITRITE UR QL STRIP: (no result)
OPIATES UR-MCNC: (no result) NG/ML
PCP SERPL-MCNC: (no result) MG/DL
PLATELET # BLD AUTO: 259 X10^3/UL (ref 140–400)
POTASSIUM SERPL-SCNC: 3.1 MMOL/L (ref 3.5–5.1)
PROT SERPL-MCNC: 7 G/DL (ref 6.4–8.2)
RBC # BLD AUTO: 4.92 X10^6/UL (ref 3.5–5.4)
RBC #/AREA URNS HPF: 0 /HPF (ref 0–2)
SODIUM SERPL-SCNC: 139 MMOL/L (ref 136–145)
SP GR UR STRIP: 1.01
SQUAMOUS #/AREA URNS LPF: (no result) /LPF
UROBILINOGEN UR-MCNC: 0.2 MG/DL
WBC # BLD AUTO: 8.9 X10^3/UL (ref 4–11)
WBC #/AREA URNS HPF: (no result) /HPF (ref 0–4)

## 2019-09-07 PROCEDURE — 80307 DRUG TEST PRSMV CHEM ANLYZR: CPT

## 2019-09-07 PROCEDURE — 81001 URINALYSIS AUTO W/SCOPE: CPT

## 2019-09-07 PROCEDURE — 96375 TX/PRO/DX INJ NEW DRUG ADDON: CPT

## 2019-09-07 PROCEDURE — 80076 HEPATIC FUNCTION PANEL: CPT

## 2019-09-07 PROCEDURE — 80048 BASIC METABOLIC PNL TOTAL CA: CPT

## 2019-09-07 PROCEDURE — 82550 ASSAY OF CK (CPK): CPT

## 2019-09-07 PROCEDURE — 85025 COMPLETE CBC W/AUTO DIFF WBC: CPT

## 2019-09-07 PROCEDURE — 83690 ASSAY OF LIPASE: CPT

## 2019-09-07 PROCEDURE — 85610 PROTHROMBIN TIME: CPT

## 2019-09-07 PROCEDURE — 83735 ASSAY OF MAGNESIUM: CPT

## 2019-09-07 PROCEDURE — 84443 ASSAY THYROID STIM HORMONE: CPT

## 2019-09-07 PROCEDURE — 93005 ELECTROCARDIOGRAM TRACING: CPT

## 2019-09-07 PROCEDURE — 36415 COLL VENOUS BLD VENIPUNCTURE: CPT

## 2019-09-07 PROCEDURE — 85379 FIBRIN DEGRADATION QUANT: CPT

## 2019-09-07 PROCEDURE — 85730 THROMBOPLASTIN TIME PARTIAL: CPT

## 2019-09-07 PROCEDURE — 96365 THER/PROPH/DIAG IV INF INIT: CPT

## 2019-09-07 PROCEDURE — 87040 BLOOD CULTURE FOR BACTERIA: CPT

## 2019-09-07 PROCEDURE — 84484 ASSAY OF TROPONIN QUANT: CPT

## 2019-09-07 PROCEDURE — 94640 AIRWAY INHALATION TREATMENT: CPT

## 2019-09-07 PROCEDURE — 96372 THER/PROPH/DIAG INJ SC/IM: CPT

## 2019-09-07 PROCEDURE — 99285 EMERGENCY DEPT VISIT HI MDM: CPT

## 2019-09-07 PROCEDURE — 71045 X-RAY EXAM CHEST 1 VIEW: CPT

## 2019-09-07 PROCEDURE — 83880 ASSAY OF NATRIURETIC PEPTIDE: CPT

## 2019-09-07 NOTE — ED.ADGEN
Past History


Past Medical History:  Anxiety, Arthritis, Asthma, COPD, GERD, Hypertension


Past Surgical History:  Tonsillectomy, Tubal ligation, Other


Additional Past Surgical Histo:  Left wrist fracture repair


Smoking:  Non-smoker, Quit Greater Than 1 Year


Alcohol Use:  Occasionally


Drug Use:  None





Adult General


Chief Complaint


Chief Complaint


".. We were having a great time.. at the bar... until the smoke got to me... "





HPI


HPI





Patient is a 68 year old female who presents with right neck pain and 

torticollis, accelerated hypertension, dyspnea and wheezing. Patient is known to

ED staff for previous episodes of bronchial asthma and COPD, hypertension and 

coronary artery disease. Patient has been doing well outpatient and not seen in 

the ED since July of this year.  Patient denies any specific ill contacts or 

travel. Patient denies any recent changes in medications. Patient normally 

follows with Dr. Saenz.  Patient follows with Dr. Whitt for cardiology.  No 

history immunosuppression. Patient has known history of asthma. Patient quit 

smoking approximately 15 years ago but had approximately 40 years of use up to 1

pack a day. Patient known history of COPD, asthma, allergies, UTI, left bundle-

branch block, chronic pain, chronic cough, and history of elevated d-dimer. 

Patient has had 3 coronary artery stents past stress test approximately a year 

and a half ago. Patient has a history of laparotomy for endometriosis, 

tonsillectomy, left wrist compound fracture repair and bilateral total knee 

arthroplasties.





Review of Systems


Review of Systems





Constitutional: Denies fever or chills []


Eyes: Denies change in visual acuity, redness, or eye pain []


HENT: Denies nasal congestion or sore throat []


Respiratory: Denies cough or shortness of breath []


Cardiovascular: No additional information not addressed in HPI []


GI: Denies abdominal pain, nausea, vomiting, bloody stools or diarrhea []


: Denies dysuria or hematuria []


Musculoskeletal: Denies back pain or joint pain []


Integument: Denies rash or skin lesions []


Neurologic: Denies headache, focal weakness or sensory changes []


Endocrine: Denies polyuria or polydipsia []





All other systems were reviewed and found to be within normal limits, except as 

documented in this note.





Family History


Family History


Noncontributory father  at age 71 because of brain cancer others had breast 

cancer





Current Medications


Current Medications





Current Medications








 Medications


  (Trade)  Dose


 Ordered  Sig/Mar  Start Time


 Stop Time Status Last Admin


Dose Admin


 


 Albuterol/


 Ipratropium


  (Duoneb)  3 ml  1X  ONCE  19 01:00


 19 01:08 DC 19 01:07


3 ML


 


 Aspirin


  (Children'S


 Aspirin)  324 mg  1X  ONCE  19 01:15


 19 01:16 DC 19 02:51


324 MG


 


 Azithromycin


  (Zithromax)  500 mg  1X  ONCE  19 01:15


 19 01:16 DC 19 02:51


500 MG


 


 Ceftriaxone


 Sodium 1 gm/


 Sodium Chloride  50 ml @ 


 100 mls/hr  1X  ONCE  19 01:15


 19 01:44 DC 19 02:53


100 MLS/HR


 


 Ceftriaxone Sodium


  (Rocephin)  1 gm  STK-MED ONCE  19 01:12


 19 01:12 DC  





 


 Lactated Ringer's  1,000 ml @ 


 100 mls/hr  Q10H  19 01:15


 19 04:28 DC 19 02:51


100 MLS/HR


 


 Methylprednisolone


 Sodium Succinate


  (SOLU-Medrol


 125MG VIAL)  125 mg  1X  ONCE  19 01:15


 19 01:16 DC 19 02:51


125 MG


 


 Morphine Sulfate


  (Morphine 10mg


 Syringe)  10 mg  1X  ONCE  19 01:15


 19 02:14 DC 19 02:17


10 MG


 


 Orphenadrine


 Citrate


  (Norflex)  60 mg  1X  ONCE  19 02:30


 19 03:15 DC 19 03:03


60 MG


 


 Potassium Chloride


  (Klor-Con)  20 meq  STK-MED ONCE  19 03:39


 19 04:28 DC  





 


 Sodium Chloride  50 ml @ As


 Directed  STK-MED ONCE  19 01:11


 19 01:12 DC  








See nursing for home medications





Allergies


Allergies





Allergies








Coded Allergies Type Severity Reaction Last Updated Verified


 


  venom-honey bee Allergy Severe Shortness of Air 14 Yes


 


  adhesive Allergy Intermediate  18 Yes


 


  cimetidine Allergy Intermediate "toxic" 7/19/15 Yes


 


  cimetidine HCl Allergy Intermediate "toxic" 7/19/15 Yes











Physical Exam


Physical Exam





Constitutional: Moderately acute distress, non-toxic appearance. []


HENT: Normocephalic, atraumatic, bilateral external ears normal, oropharynx 

moist, no oral exudates, nose normal. []


Eyes: PERRLA, EOMI, conjunctiva normal, no discharge. [] 


Neck: Admitted range of motion, due to muscle spasm and tenderness, right 

trapezius spasms and torticollis, no stridor. [] 


Cardiovascular:Heart rate regular rhythm, no murmur. PMI to the left


Lungs & Thorax:  Bilateral breath sounds equal at apex with scattered wheezes on

 auscultation []


Abdomen: Bowel sounds normal, soft, no tenderness, no masses, no pulsatile mass

es. [Old surgery scar


Skin: Warm, dry, no erythema, no rash. [] 


Back: No tenderness, no CVA tenderness. [] 


Extremities: No tenderness, no cyanosis, no clubbing, ROM intact, no edema. [No 

cording appreciated.  Lt. wirist scar. 


Neurologic: Alert and oriented X 3, normal motor function, normal sensory 

function, no gross focal deficits noted. []


Psychologic: Affect anxious, judgement normal, mood normal. []





Current Patient Data


Vital Signs





                                   Vital Signs








  Date Time  Temp Pulse Resp B/P (MAP) Pulse Ox O2 Delivery O2 Flow Rate FiO2


 


19 04:02  91 18 145/79 (101) 96 Room Air  


 


19 00:51 97.7       








Lab Results





                                Laboratory Tests








Test


 19


02:00 19


03:05


 


White Blood Count


 8.9 x10^3/uL


(4.0-11.0) 





 


Red Blood Count


 4.92 x10^6/uL


(3.50-5.40) 





 


Hemoglobin


 14.6 g/dL


(12.0-15.5) 





 


Hematocrit


 44.0 %


(36.0-47.0) 





 


Mean Corpuscular Volume


 90 fL ()


 





 


Mean Corpuscular Hemoglobin 30 pg (25-35)   


 


Mean Corpuscular Hemoglobin


Concent 33 g/dL


(31-37) 





 


Red Cell Distribution Width


 14.1 %


(11.5-14.5) 





 


Platelet Count


 259 x10^3/uL


(140-400) 





 


Neutrophils (%) (Auto) 57 % (31-73)   


 


Lymphocytes (%) (Auto) 30 % (24-48)   


 


Monocytes (%) (Auto) 9 % (0-9)   


 


Eosinophils (%) (Auto) 4 % (0-3)  H 


 


Basophils (%) (Auto) 0 % (0-3)   


 


Neutrophils # (Auto)


 5.1 x10^3uL


(1.8-7.7) 





 


Lymphocytes # (Auto)


 2.6 x10^3/uL


(1.0-4.8) 





 


Monocytes # (Auto)


 0.8 x10^3/uL


(0.0-1.1) 





 


Eosinophils # (Auto)


 0.3 x10^3/uL


(0.0-0.7) 





 


Basophils # (Auto)


 0.0 x10^3/uL


(0.0-0.2) 





 


Urine Collection Type Unknown   


 


Urine Color Straw   


 


Urine Clarity Hazy   


 


Urine pH 6.5   


 


Urine Specific Gravity 1.015   


 


Urine Protein


 Neg


(NEG-TRACE) 





 


Urine Glucose (UA)


 Neg mg/dL


(NEG) 





 


Urine Ketones (Stick)


 Neg mg/dL


(NEG) 





 


Urine Blood Neg (NEG)   


 


Urine Nitrite Neg (NEG)   


 


Urine Bilirubin Neg (NEG)   


 


Urine Urobilinogen Dipstick


 0.2 mg/dL (0.2


mg/dL) 





 


Urine Leukocyte Esterase Neg (NEG)   


 


Urine RBC 0 /HPF (0-2)   


 


Urine WBC


 Occ /HPF (0-4)


 





 


Urine Squamous Epithelial


Cells Few /LPF  


 





 


Urine Transitional Epithelial


Cells Occ /LPF  


 





 


Urine Renal Epithelial Cells Occ /LPF   


 


Urine Bacteria


 Few /HPF


(0-FEW) 





 


Urine Mucus Slight /LPF   


 


Sodium Level


 139 mmol/L


(136-145) 





 


Potassium Level


 3.1 mmol/L


(3.5-5.1)  L 





 


Chloride Level


 104 mmol/L


() 





 


Carbon Dioxide Level


 22 mmol/L


(21-32) 





 


Anion Gap 13 (6-14)   


 


Blood Urea Nitrogen


 5 mg/dL (7-20)


L 





 


Creatinine


 0.8 mg/dL


(0.6-1.0) 





 


Estimated GFR


(Cockcroft-Gault) 71.8  


 





 


Glucose Level


 125 mg/dL


(70-99)  H 





 


Calcium Level


 8.8 mg/dL


(8.5-10.1) 





 


Magnesium Level


 2.1 mg/dL


(1.8-2.4) 





 


Total Bilirubin


 0.7 mg/dL


(0.2-1.0) 





 


Direct Bilirubin


 0.2 mg/dL


(0.0-0.2) 





 


Aspartate Amino Transferase


(AST) 18 U/L (15-37)


 





 


Alanine Aminotransferase (ALT)


 15 U/L (14-59)


 





 


Alkaline Phosphatase


 52 U/L


() 





 


Creatine Kinase


 113 U/L


() 





 


Troponin I Quantitative


 < 0.017 ng/mL


(0-0.055) 





 


NT-Pro-B-Type Natriuretic


Peptide 255 pg/mL


(0-124)  H 





 


Total Protein


 7.0 g/dL


(6.4-8.2) 





 


Albumin


 3.7 g/dL


(3.4-5.0) 





 


Lipase


 166 U/L


() 





 


Urine Opiates Screen Neg (NEG)   


 


Urine Methadone Screen Neg (NEG)   


 


Urine Barbiturates Neg (NEG)   


 


Urine Phencyclidine Screen Neg (NEG)   


 


Urine


Amphetamine/Methamphetamine Neg (NEG)  


 





 


Urine Benzodiazepines Screen Neg (NEG)   


 


Urine Cocaine Screen Neg (NEG)   


 


Urine Cannabinoids Screen Neg (NEG)   


 


Urine Ethyl Alcohol Neg (NEG)   


 


Prothrombin Time


 


 10.0 SEC


(9.4-11.4)


 


Prothrombin Time INR  1.0 (0.9-1.1)  


 


Activated Partial


Thromboplast Time 


 25 SEC (23-33)





 


D-Dimer (Yessenia)


 


 0.44 mg/L


(0.00-0.50)











EKG


EKG


My interpretation EKG shows a sinus rhythm at 80 bpm left axis and 

intraventricular block.[]





Radiology/Procedures


Radiology/Procedures


[][facility] [address], [place] [address]


                                 [phone]


                                        


                                 IMAGING REPORT





                                     Signed





PATIENT: PARISH RICO LACCOUNT: [id number]     MRN#: [id number]


: 1950           LOCATION: ER              AGE: 68


SEX: F                    EXAM DT: 19         ACCESSION#: [id number]


STATUS: REG ER            ORD. PHYSICIAN: JESÚS LEIGH MD


REASON: dyspnea, cough


PROCEDURE: PORTABLE CHEST 1V





AP chest.


 


HISTORY: Dyspnea, cough


 


AP view was taken of the chest. There is density at the left heart apex 


which was not evident on the old study from 2018. Focal atelectasis 


or infiltrate or scarring could have this pattern. No other infiltrates 


are noted. There is no pleural effusion.


 


IMPRESSION:


1. Scarring or atelectasis or developing density at the left cardiac apex.


2. No other acute chest disease.


 


Electronically signed by: Edison Noriega MD (2019 1:21 AM) [facility]














DICTATED AND SIGNED BY:     EDISON NORIEGA MD


DATE:     19 0121





CC: PB SAENZ MD; JESÚS LEIGH MD ~





Course & Med Decision Making


Course & Med Decision Making


Pertinent Labs and Imaging studies reviewed. (See chart for details)





Avoid smoke-filled bars. Avoid tobacco smoke. We'll take his Zithromax 250 a day

 for 5 days. Take prednisone 50 mg a day for 5 days. Follow-up primary care. 

Push fruit juices. Return if any concerns. Use MDI or albuterol treatments 4 

times a day.





[]





Final Impression


Final Impression


1. Asthma exacerbation/COPD


2. Right trapezius spasm and torticollis


3. Accelerated hypertension[]


4. Hypokalemia 3.1


5. Mild elevation in BNP =255





Dragon Disclaimer


Dragon Disclaimer


This electronic medical record was generated, in whole or in part, using a voice

 recognition dictation system.





Dragon Disclaimer


This chart was dictated in whole or in part using Voice Recognition software in 

a busy, high-work load, and often noisy Emergency Department environment.  It 

may contain unintended and wholly unrecognized errors or omissions.











JESÚS LEIGH MD            Sep 7, 2019 01:22

## 2019-09-07 NOTE — RAD
AP chest.

 

HISTORY: Dyspnea, cough

 

AP view was taken of the chest. There is density at the left heart apex 

which was not evident on the old study from July 2018. Focal atelectasis 

or infiltrate or scarring could have this pattern. No other infiltrates 

are noted. There is no pleural effusion.

 

IMPRESSION:

1. Scarring or atelectasis or developing density at the left cardiac apex.

2. No other acute chest disease.

 

Electronically signed by: Edison Noriega MD (9/7/2019 1:21 AM) [facility]

## 2019-09-09 NOTE — EKG
[facility] [address], [place] [address]

Test Date:    2019               Test Time:    01:00:20

Pat Name:     PARISH RICO      Department:   

Patient ID:   [id number]           Room:          

Gender:       F                        Technician:   

:          1950               Requested By: JESÚS LEIGH

Order Number: [id number]            Reading MD:     

                                 Measurements

Intervals                              Axis          

Rate:         80                       P:            59

NE:           170                      QRS:          -19

QRSD:         132                      T:            90

QT:           394                                    

QTc:          458                                    

                           Interpretive Statements

SINUS RHYTHM

LEFTWARD AXIS

NON SPECIFIC INTRAVENTRICULAR BLOCK

ABNORMAL ECG

RI6.01

No previous ECG available for comparison

## 2019-10-10 ENCOUNTER — HOSPITAL ENCOUNTER (EMERGENCY)
Dept: HOSPITAL 63 - ER | Age: 69
Discharge: HOME | End: 2019-10-10
Payer: COMMERCIAL

## 2019-10-10 VITALS — HEIGHT: 60 IN | WEIGHT: 166.23 LBS | BODY MASS INDEX: 32.64 KG/M2

## 2019-10-10 VITALS — DIASTOLIC BLOOD PRESSURE: 79 MMHG | SYSTOLIC BLOOD PRESSURE: 145 MMHG

## 2019-10-10 DIAGNOSIS — Z88.8: ICD-10-CM

## 2019-10-10 DIAGNOSIS — Z91.030: ICD-10-CM

## 2019-10-10 DIAGNOSIS — M19.90: ICD-10-CM
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
## 2019-10-10 DIAGNOSIS — S60.221A: Primary | ICD-10-CM

## 2019-10-10 DIAGNOSIS — Z87.891: ICD-10-CM

## 2019-10-10 DIAGNOSIS — K21.9: ICD-10-CM
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
## 2019-10-10 DIAGNOSIS — I10: ICD-10-CM
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
## 2019-10-10 DIAGNOSIS — J44.9: ICD-10-CM

## 2019-10-10 PROCEDURE — 73130 X-RAY EXAM OF HAND: CPT

## 2019-10-10 PROCEDURE — 99284 EMERGENCY DEPT VISIT MOD MDM: CPT

## 2019-10-10 NOTE — PHYS DOC
Past History


Past Medical History:  Anxiety, Arthritis, Asthma, COPD, GERD, Hypertension


Past Surgical History:  Tonsillectomy, Tubal ligation, Other


Additional Past Surgical Histo:  Left wrist fracture repair


Smoking:  Quit Greater Than 1 Year


Alcohol Use:  Occasionally


Drug Use:  None





Adult General


Chief Complaint


Chief Complaint:  HAND PROBLEM





HPI


HPI


68-year-old female presents with report of swelling to dorsum of right hand 

which occurred earlier tonight. Patient reports she was out playing pool. 

Patient reports some discomfort to the back of her hand. Denies trauma. Patient 

has been drinking here this evening.  Reports has had 2 beers. Patient reports 

she is currently treated with Plavix and aspirin.





Review of Systems


Review of Systems





Constitutional: Denies fever or chills 


Eyes: Denies redness or eye pain 


HENT: Denies nasal congestion or sore throat


Respiratory: Denies cough or shortness of breath 


Cardiovascular: Denies chest pain or palpitations


GI: Denies abdominal pain, nausea, or vomiting


: Denies dysuria or hematuria


Musculoskeletal: Denies back pain; reports pain to dorsum of right hand


Integument: Reports right hand swelling and bruising


Neurologic: Denies headache, focal weakness or sensory changes





Complete systems were reviewed and found to be within normal limits, except as 

documented in this note.





Current Medications


Current Medications





Current Medications








 Medications


  (Trade)  Dose


 Ordered  Sig/Mar  Start Time


 Stop Time Status Last Admin


Dose Admin


 


 Acetaminophen/


 Hydrocodone Bitart


  (Lortab 5/325)  1 tab  1X  ONCE  10/10/19 02:00


 10/10/19 02:01   














Allergies


Allergies





Allergies








Coded Allergies Type Severity Reaction Last Updated Verified


 


  venom-honey bee Allergy Severe Shortness of Air 5/29/14 Yes


 


  adhesive Allergy Intermediate  2/21/18 Yes


 


  cimetidine Allergy Intermediate "toxic" 7/19/15 Yes


 


  cimetidine HCl Allergy Intermediate "toxic" 7/19/15 Yes











Physical Exam


Physical Exam





Constitutional: Well developed, well nourished, no acute distress, non-toxic 

appearance


HENT: Normocephalic, atraumatic, oropharynx moist


Eyes: Conjunctiva normal, no discharge


Neck: Normal range of motion, no tenderness, supple


Cardiovascular: Heart rate normal, regular rhythm


Lungs & Thorax:  Bilateral breath sounds clear to auscultation, no wheezing


Abdomen: Soft, no tenderness


Skin: Warm, dry, no erythema, hematoma noted to dorsum of right hand


Extremities: Tenderness to right hand metacarpals 2-4, ROM intact 


Neurologic: Alert and oriented X 3, no focal deficits noted


Psychologic: Affect normal, judgement normal





Current Patient Data


Vital Signs





                                   Vital Signs








  Date Time  Temp Pulse Resp B/P (MAP) Pulse Ox O2 Delivery O2 Flow Rate FiO2


 


10/10/19 01:07 98.2 81 16  95 Room Air  











EKG


EKG


[]





Radiology/Procedures


Radiology/Procedures


PROCEDURE: HAND RIGHT 3V





Study: HAND RIGHT 3V


 


Indication: Pain/swelling/hematoma of the right hand.


 


Comparison: 10/21/2017


 


Findings:


 


Prominent soft tissue prominence seen along the dorsum of the hand at the 


level of the distal metacarpals/MCP joints. No subjacent bony abnormality 


is identified. Alignment is maintained.


 


Multifocal degenerative changes throughout the hand and wrist with 


osteophytosis and regions of joint space narrowing. Mineralization along 


the thumb IP joint was present on the 2017 comparison.


 


Impression:


 


1. Prominent soft tissue swelling along the dorsum of the hand at the 


level of the distal metacarpals/MCP joints. No subjacent acute fracture or


erosive bony change. No retained radiopaque foreign body.


2. Multifocal degenerative changes throughout the hand and wrist with 


osteophytosis and joint space narrowing.


 


Electronically signed by: MARIAM KATHLEEN MD (10/10/2019 1:52 AM) 


[facility] [place]





Course & Med Decision Making


Course & Med Decision Making


Pertinent Imaging studies reviewed. (See chart for details)





Patient presents with history of present illness and physical exam consistent 

for hematoma to dorsum of right hand. Ice applied. Pain addressed. X-ray 

obtained without fracture dislocation. Ace bandage applied. Patient stable for 

discharge with outpatient follow-up with PCP. Discussed findings and plan with 

patient and family, who acknowledge understanding and agreement.





Dragon Disclaimer


Dragon Disclaimer


This electronic medical record was generated, in whole or in part, using a voice

 recognition dictation system.





Splinting


Splinting :  


   Location:  right hand


   Pre-Made Type:  Ace bandage


   Pre-Proc Neuro Vasc Exam:  normal


   Post-Proc Neuro Vasc Exam:  normal, unchanged from pre-exam





Departure


Departure:


Impression:  


   Primary Impression:  


   Traumatic hematoma of right hand


Disposition:  01 HOME, SELF-CARE


Condition:  STABLE


Referrals:  


PB SAENZ MD (PCP)


Patient Instructions:  Hand Hematoma-[facility]


Scripts


Hydrocodone Bit/Acetaminophen (NORCO 5-325 TABLET) 1 Each Tablet


0.5-1 TAB PO Q6HRS PRN for PAIN, #6 TAB


   Prov: SANJAY BOUCHER DO         10/10/19





Problem Qualifiers








   Primary Impression:  


   Traumatic hematoma of right hand


   Encounter type:  initial encounter  Qualified Codes:  S60.221A - Contusion of

    right hand, initial encounter








SANJAY BOUCHER DO             Oct 10, 2019 01:45

## 2019-10-10 NOTE — RAD
Study: HAND RIGHT 3V

 

Indication: Pain/swelling/hematoma of the right hand.

 

Comparison: 10/21/2017

 

Findings:

 

Prominent soft tissue prominence seen along the dorsum of the hand at the 

level of the distal metacarpals/MCP joints. No subjacent bony abnormality 

is identified. Alignment is maintained.

 

Multifocal degenerative changes throughout the hand and wrist with 

osteophytosis and regions of joint space narrowing. Mineralization along 

the thumb IP joint was present on the 2017 comparison.

 

Impression:

 

1. Prominent soft tissue swelling along the dorsum of the hand at the 

level of the distal metacarpals/MCP joints. No subjacent acute fracture or

erosive bony change. No retained radiopaque foreign body.

2. Multifocal degenerative changes throughout the hand and wrist with 

osteophytosis and joint space narrowing.

 

Electronically signed by: MARIAM KATHLEEN MD (10/10/2019 1:52 AM) 

[facility]

## 2019-10-19 ENCOUNTER — HOSPITAL ENCOUNTER (EMERGENCY)
Dept: HOSPITAL 63 - ER | Age: 69
Discharge: HOME | End: 2019-10-19
Payer: COMMERCIAL

## 2019-10-19 VITALS — SYSTOLIC BLOOD PRESSURE: 145 MMHG | DIASTOLIC BLOOD PRESSURE: 84 MMHG

## 2019-10-19 VITALS — BODY MASS INDEX: 32.64 KG/M2 | WEIGHT: 166.23 LBS | HEIGHT: 60 IN

## 2019-10-19 DIAGNOSIS — Z88.8: ICD-10-CM

## 2019-10-19 DIAGNOSIS — S69.91XD: Primary | ICD-10-CM

## 2019-10-19 DIAGNOSIS — J44.9: ICD-10-CM

## 2019-10-19 DIAGNOSIS — I10: ICD-10-CM

## 2019-10-19 DIAGNOSIS — Z87.891: ICD-10-CM

## 2019-10-19 DIAGNOSIS — M19.90: ICD-10-CM
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
## 2019-10-19 DIAGNOSIS — Z91.030: ICD-10-CM

## 2019-10-19 DIAGNOSIS — K21.9: ICD-10-CM

## 2019-10-19 DIAGNOSIS — F41.9: ICD-10-CM

## 2019-10-19 PROCEDURE — 99281 EMR DPT VST MAYX REQ PHY/QHP: CPT

## 2019-10-19 NOTE — PHYS DOC
Past History


Past Medical History:  Anxiety, Arthritis, Asthma, COPD, GERD, Hypertension


Past Surgical History:  Tonsillectomy, Tubal ligation, Other


Additional Past Surgical Histo:  Left wrist fracture repair


Smoking:  Quit Greater Than 1 Year


Alcohol Use:  Occasionally


Drug Use:  None





Adult General


Chief Complaint


Chief Complaint:  WRIST PAIN





HPI


HPI


68-year-old female presenting the emergency department today with right hand 

pain after injuring it a few days ago. She had an x-ray of the hand on the 10th 

when she injured it. It is much better than it was before. X-rays were negative 

then. Over the past 24-48 hours her swelling has increased mildly and the pain 

is coming back a little worse. She is not followed up with her doctor. She is 

using a simple wrist/hand splint which is helping her pain.





Review of systems is negative for chest pain shortness of breath or any other 

injury. All other review of systems negative





ED course: 68-year-old female presenting with pain in the hand. X-rays 

previously obtained were unremarkable. We will refer her to her primary 

physician within one to 2 days for an MRI of the hand for possible occult 

injury. In the interim she will wear the splint that she was given.





Allergies


Allergies





Allergies








Coded Allergies Type Severity Reaction Last Updated Verified


 


  venom-honey bee Allergy Severe Shortness of Air 5/29/14 Yes


 


  adhesive Allergy Intermediate  2/21/18 Yes


 


  cimetidine Allergy Intermediate "toxic" 7/19/15 Yes


 


  cimetidine HCl Allergy Intermediate "toxic" 7/19/15 Yes











Physical Exam


Physical Exam





Constitutional: Well developed, well nourished, no acute distress, non-toxic 

appearance. []


HENT: Normocephalic, atraumatic, bilateral external ears normal, oropharynx 

moist, no oral exudates, nose normal. []


Eyes: PERRLA, EOMI, conjunctiva normal, no discharge. [] 


Neck: Normal range of motion, no tenderness, supple, no stridor. [] 


Cardiovascular:Heart rate regular rhythm, no murmur []


Lungs & Thorax:  Bilateral breath sounds clear to auscultation []


Abdomen: Bowel sounds normal, soft, no tenderness, no masses, no pulsatile nanette

s. [] 


Skin: Warm, dry, no erythema, no rash. [] 


Back: No tenderness, no CVA tenderness. [] 


Extremities: The patient's hand has mild tenderness palpation along the third 

metacarpal distally. There is mild amount of swelling at the third metacarpal 

phalangeal joint. It is not warm to touch. There are no abrasions or lacerations

 over the skin. Otherwise nontender scaphoid. Nontender wrist. Normal 

neurovascular status. No signs of tendon injury.


Neurologic: Alert and oriented X 3, normal motor function, normal sensory 

function, no focal deficits noted. []


Psychologic: Affect normal, judgement normal, mood normal. []





Current Patient Data


Vital Signs





                                   Vital Signs








  Date Time  Temp Pulse Resp B/P (MAP) Pulse Ox O2 Delivery O2 Flow Rate FiO2


 


10/19/19 13:21 97.5 67 20 145/84 (104) 95 Room Air  











EKG


EKG


[]





Radiology/Procedures


Radiology/Procedures


[]





Course & Med Decision Making


Course & Med Decision Making


Pertinent Labs and Imaging studies reviewed. (See chart for details)





[]





Dragon Disclaimer


Dragon Disclaimer


This electronic medical record was generated, in whole or in part, using a voice

 recognition dictation system.





Departure


Departure:


Impression:  


   Primary Impression:  


   Hand injury


Disposition:  01 HOME, SELF-CARE


Condition:  STABLE


Referrals:  


PB SAENZ MD (PCP)


Patient Instructions:  Hand Injuries





Additional Instructions:  


Thank you for allowing us to participate in your care today.





Return to the emergency department you have any new or worsening symptoms, or if

 you are concerned for any reason. Return to emergency department if you have 

any  new or concerning symptoms including but not limited to fever, chills, 

nausea, vomiting, intractable pain, any new rashes, chest pain, shortness of 

air, uncontrolled bleeding, difficulty breathing, and/or vision loss.





Follow up with your primary care physician within 1-2 days to get an MRI of the 

hand .  Call your Primary Doctor tomorrow and inform them of your visit today.  

If you do not have a primary care provider we are happy to provide you with a 

list of our primary care providers contact information. 





This condition should be evaluated by your primary care physician and any 

recommended consulting services for continued management within 2 days after 

discharge. If at any time, you are having difficulty getting into your primary 

care doctor or a specialist, return to the emergency department.











ESTEFANY DEVNIE MD               Oct 19, 2019 13:50

## 2019-12-29 ENCOUNTER — HOSPITAL ENCOUNTER (EMERGENCY)
Dept: HOSPITAL 63 - ER | Age: 69
Discharge: HOME | End: 2019-12-29
Payer: COMMERCIAL

## 2019-12-29 VITALS — HEIGHT: 60 IN | WEIGHT: 166 LBS | BODY MASS INDEX: 32.59 KG/M2

## 2019-12-29 VITALS — DIASTOLIC BLOOD PRESSURE: 53 MMHG | SYSTOLIC BLOOD PRESSURE: 143 MMHG

## 2019-12-29 DIAGNOSIS — Z88.8: ICD-10-CM

## 2019-12-29 DIAGNOSIS — Z87.891: ICD-10-CM

## 2019-12-29 DIAGNOSIS — J44.9: ICD-10-CM

## 2019-12-29 DIAGNOSIS — L29.9: Primary | ICD-10-CM

## 2019-12-29 DIAGNOSIS — F41.9: ICD-10-CM

## 2019-12-29 DIAGNOSIS — R11.0: ICD-10-CM

## 2019-12-29 DIAGNOSIS — E78.00: ICD-10-CM

## 2019-12-29 DIAGNOSIS — Z91.030: ICD-10-CM

## 2019-12-29 LAB
ALBUMIN SERPL-MCNC: 3.6 G/DL (ref 3.4–5)
ALBUMIN/GLOB SERPL: 1.4 {RATIO} (ref 1–1.7)
ALP SERPL-CCNC: 58 U/L (ref 46–116)
ALT SERPL-CCNC: 28 U/L (ref 14–59)
ANION GAP SERPL CALC-SCNC: 11 MMOL/L (ref 6–14)
APTT PPP: YELLOW S
AST SERPL-CCNC: 20 U/L (ref 15–37)
BACTERIA #/AREA URNS HPF: (no result) /HPF
BASOPHILS # BLD AUTO: 0.1 X10^3/UL (ref 0–0.2)
BASOPHILS NFR BLD: 1 % (ref 0–3)
BILIRUB SERPL-MCNC: 0.4 MG/DL (ref 0.2–1)
BILIRUB UR QL STRIP: (no result)
BUN/CREAT SERPL: 14 (ref 6–20)
CA-I SERPL ISE-MCNC: 11 MG/DL (ref 7–20)
CALCIUM SERPL-MCNC: 8.3 MG/DL (ref 8.5–10.1)
CHLORIDE SERPL-SCNC: 107 MMOL/L (ref 98–107)
CO2 SERPL-SCNC: 26 MMOL/L (ref 21–32)
CREAT SERPL-MCNC: 0.8 MG/DL (ref 0.6–1)
EOSINOPHIL NFR BLD: 0.4 X10^3/UL (ref 0–0.7)
EOSINOPHIL NFR BLD: 7 % (ref 0–3)
ERYTHROCYTE [DISTWIDTH] IN BLOOD BY AUTOMATED COUNT: 13.7 % (ref 11.5–14.5)
FIBRINOGEN PPP-MCNC: (no result) MG/DL
GFR SERPLBLD BASED ON 1.73 SQ M-ARVRAT: 71.5 ML/MIN
GLOBULIN SER-MCNC: 2.5 G/DL (ref 2.2–3.8)
GLUCOSE SERPL-MCNC: 92 MG/DL (ref 70–99)
GLUCOSE UR STRIP-MCNC: (no result) MG/DL
HCT VFR BLD CALC: 43.9 % (ref 36–47)
HGB BLD-MCNC: 14.7 G/DL (ref 12–15.5)
HYALINE CASTS #/AREA URNS LPF: (no result) /HPF
LIPASE: 257 U/L (ref 73–393)
LYMPHOCYTES # BLD: 1.6 X10^3/UL (ref 1–4.8)
LYMPHOCYTES NFR BLD AUTO: 25 % (ref 24–48)
MCH RBC QN AUTO: 30 PG (ref 25–35)
MCHC RBC AUTO-ENTMCNC: 34 G/DL (ref 31–37)
MCV RBC AUTO: 88 FL (ref 79–100)
MONO #: 0.4 X10^3/UL (ref 0–1.1)
MONOCYTES NFR BLD: 7 % (ref 0–9)
NEUT #: 3.9 X10^3UL (ref 1.8–7.7)
NEUTROPHILS NFR BLD AUTO: 60 % (ref 31–73)
NITRITE UR QL STRIP: (no result)
PLATELET # BLD AUTO: 244 X10^3/UL (ref 140–400)
POTASSIUM SERPL-SCNC: 3.6 MMOL/L (ref 3.5–5.1)
PROT SERPL-MCNC: 6.1 G/DL (ref 6.4–8.2)
RBC # BLD AUTO: 4.99 X10^6/UL (ref 3.5–5.4)
RBC #/AREA URNS HPF: (no result) /HPF (ref 0–2)
SODIUM SERPL-SCNC: 144 MMOL/L (ref 136–145)
SP GR UR STRIP: 1.02
SQUAMOUS #/AREA URNS LPF: (no result) /LPF
UROBILINOGEN UR-MCNC: 0.2 MG/DL
WBC # BLD AUTO: 6.5 X10^3/UL (ref 4–11)
WBC #/AREA URNS HPF: (no result) /HPF (ref 0–4)

## 2019-12-29 PROCEDURE — 36415 COLL VENOUS BLD VENIPUNCTURE: CPT

## 2019-12-29 PROCEDURE — 94640 AIRWAY INHALATION TREATMENT: CPT

## 2019-12-29 PROCEDURE — 96374 THER/PROPH/DIAG INJ IV PUSH: CPT

## 2019-12-29 PROCEDURE — 80053 COMPREHEN METABOLIC PANEL: CPT

## 2019-12-29 PROCEDURE — 81001 URINALYSIS AUTO W/SCOPE: CPT

## 2019-12-29 PROCEDURE — 85025 COMPLETE CBC W/AUTO DIFF WBC: CPT

## 2019-12-29 PROCEDURE — 71045 X-RAY EXAM CHEST 1 VIEW: CPT

## 2019-12-29 PROCEDURE — 96375 TX/PRO/DX INJ NEW DRUG ADDON: CPT

## 2019-12-29 PROCEDURE — 83690 ASSAY OF LIPASE: CPT

## 2019-12-29 PROCEDURE — 99285 EMERGENCY DEPT VISIT HI MDM: CPT

## 2019-12-29 NOTE — RAD
EXAM: CHEST 1 VIEW 

 

History: Shortness of breath 

 

COMPARISON: 9/7/2019

 

TECHNIQUE: Single portable radiograph of the chest

 

FINDINGS:  The cardiac silhouette is unremarkable. The lungs are clear 

bilaterally. The costophrenic sulci are clear and well demarcated.

 

IMPRESSION:  No radiographic evidence of an acute cardiopulmonary process.

 

 

Electronically signed by: Darrion Antonio MD (12/29/2019 10:24 AM) [facility]

## 2020-02-06 ENCOUNTER — HOSPITAL ENCOUNTER (OUTPATIENT)
Dept: HOSPITAL 63 - ER | Age: 70
Setting detail: OBSERVATION
LOS: 2 days | Discharge: HOME | End: 2020-02-08
Attending: INTERNAL MEDICINE | Admitting: INTERNAL MEDICINE
Payer: MEDICARE

## 2020-02-06 VITALS — WEIGHT: 165.13 LBS | BODY MASS INDEX: 33.29 KG/M2 | HEIGHT: 59 IN

## 2020-02-06 VITALS — SYSTOLIC BLOOD PRESSURE: 147 MMHG | DIASTOLIC BLOOD PRESSURE: 85 MMHG

## 2020-02-06 DIAGNOSIS — Z87.891: ICD-10-CM

## 2020-02-06 DIAGNOSIS — Z95.5: ICD-10-CM

## 2020-02-06 DIAGNOSIS — Z96.653: ICD-10-CM

## 2020-02-06 DIAGNOSIS — J44.1: Primary | ICD-10-CM

## 2020-02-06 DIAGNOSIS — I44.7: ICD-10-CM

## 2020-02-06 DIAGNOSIS — E78.5: ICD-10-CM

## 2020-02-06 DIAGNOSIS — I10: ICD-10-CM

## 2020-02-06 DIAGNOSIS — I25.10: ICD-10-CM

## 2020-02-06 DIAGNOSIS — K21.9: ICD-10-CM

## 2020-02-06 DIAGNOSIS — K22.2: ICD-10-CM

## 2020-02-06 LAB
ALBUMIN SERPL-MCNC: 3.5 G/DL (ref 3.4–5)
ALBUMIN/GLOB SERPL: 0.9 {RATIO} (ref 1–1.7)
ALP SERPL-CCNC: 60 U/L (ref 46–116)
ALT SERPL-CCNC: 23 U/L (ref 14–59)
ANION GAP SERPL CALC-SCNC: 11 MMOL/L (ref 6–14)
AST SERPL-CCNC: 18 U/L (ref 15–37)
BASOPHILS # BLD AUTO: 0.1 X10^3/UL (ref 0–0.2)
BASOPHILS NFR BLD: 1 % (ref 0–3)
BILIRUB SERPL-MCNC: 0.5 MG/DL (ref 0.2–1)
BUN/CREAT SERPL: 9 (ref 6–20)
CA-I SERPL ISE-MCNC: 7 MG/DL (ref 7–20)
CALCIUM SERPL-MCNC: 8.9 MG/DL (ref 8.5–10.1)
CHLORIDE SERPL-SCNC: 106 MMOL/L (ref 98–107)
CO2 SERPL-SCNC: 26 MMOL/L (ref 21–32)
CREAT SERPL-MCNC: 0.8 MG/DL (ref 0.6–1)
EOSINOPHIL NFR BLD: 0.7 X10^3/UL (ref 0–0.7)
EOSINOPHIL NFR BLD: 8 % (ref 0–3)
ERYTHROCYTE [DISTWIDTH] IN BLOOD BY AUTOMATED COUNT: 14.2 % (ref 11.5–14.5)
GFR SERPLBLD BASED ON 1.73 SQ M-ARVRAT: 71.5 ML/MIN
GLOBULIN SER-MCNC: 3.7 G/DL (ref 2.2–3.8)
GLUCOSE SERPL-MCNC: 90 MG/DL (ref 70–99)
HCT VFR BLD CALC: 47.3 % (ref 36–47)
HGB BLD-MCNC: 15.3 G/DL (ref 12–15.5)
INFLUENZA A PATIENT: NEGATIVE
INFLUENZA B PATIENT: NEGATIVE
LYMPHOCYTES # BLD: 1.3 X10^3/UL (ref 1–4.8)
LYMPHOCYTES NFR BLD AUTO: 14 % (ref 24–48)
MCH RBC QN AUTO: 29 PG (ref 25–35)
MCHC RBC AUTO-ENTMCNC: 32 G/DL (ref 31–37)
MCV RBC AUTO: 90 FL (ref 79–100)
MONO #: 0.7 X10^3/UL (ref 0–1.1)
MONOCYTES NFR BLD: 7 % (ref 0–9)
NEUT #: 6.9 X10^3UL (ref 1.8–7.7)
NEUTROPHILS NFR BLD AUTO: 71 % (ref 31–73)
PLATELET # BLD AUTO: 278 X10^3/UL (ref 140–400)
POTASSIUM SERPL-SCNC: 3.8 MMOL/L (ref 3.5–5.1)
PROT SERPL-MCNC: 7.2 G/DL (ref 6.4–8.2)
RBC # BLD AUTO: 5.24 X10^6/UL (ref 3.5–5.4)
SODIUM SERPL-SCNC: 143 MMOL/L (ref 136–145)
WBC # BLD AUTO: 9.7 X10^3/UL (ref 4–11)

## 2020-02-06 PROCEDURE — 85025 COMPLETE CBC W/AUTO DIFF WBC: CPT

## 2020-02-06 PROCEDURE — 94640 AIRWAY INHALATION TREATMENT: CPT

## 2020-02-06 PROCEDURE — 80053 COMPREHEN METABOLIC PANEL: CPT

## 2020-02-06 PROCEDURE — 84484 ASSAY OF TROPONIN QUANT: CPT

## 2020-02-06 PROCEDURE — 83880 ASSAY OF NATRIURETIC PEPTIDE: CPT

## 2020-02-06 PROCEDURE — 93005 ELECTROCARDIOGRAM TRACING: CPT

## 2020-02-06 PROCEDURE — 80048 BASIC METABOLIC PNL TOTAL CA: CPT

## 2020-02-06 PROCEDURE — G0378 HOSPITAL OBSERVATION PER HR: HCPCS

## 2020-02-06 PROCEDURE — 71045 X-RAY EXAM CHEST 1 VIEW: CPT

## 2020-02-06 PROCEDURE — 87804 INFLUENZA ASSAY W/OPTIC: CPT

## 2020-02-06 PROCEDURE — 36415 COLL VENOUS BLD VENIPUNCTURE: CPT

## 2020-02-06 PROCEDURE — 96361 HYDRATE IV INFUSION ADD-ON: CPT

## 2020-02-06 PROCEDURE — 99285 EMERGENCY DEPT VISIT HI MDM: CPT

## 2020-02-06 PROCEDURE — 96374 THER/PROPH/DIAG INJ IV PUSH: CPT

## 2020-02-06 PROCEDURE — 82553 CREATINE MB FRACTION: CPT

## 2020-02-06 PROCEDURE — 96375 TX/PRO/DX INJ NEW DRUG ADDON: CPT

## 2020-02-06 PROCEDURE — 96376 TX/PRO/DX INJ SAME DRUG ADON: CPT

## 2020-02-06 PROCEDURE — G0379 DIRECT REFER HOSPITAL OBSERV: HCPCS

## 2020-02-06 PROCEDURE — 99284 EMERGENCY DEPT VISIT MOD MDM: CPT

## 2020-02-06 NOTE — RAD
EXAM: AP View of the chest

 

DATE: 2/6/2020 6:34 PM

 

INDICATION: cough, shortness of breath

 

COMPARISON: 12/29/2019, 9/7/2019

 

FINDINGS:

The heart is not enlarged.

 

Mediastinal and hilar contours are normal.

 

Linear opacities left lung base likely scarring/atelectasis.

 

No pneumothorax. Trace blunting left costophrenic angle, likely trace left

pleural effusion.

 

IMPRESSION:

Linear opacities left lung base likely scarring/atelectasis.

 

Electronically signed by: Graham Escobar MD (2/6/2020 8:32 PM) [id number]

## 2020-02-06 NOTE — EKG
[facility] [address], [place] [address]

Test Date:    2020               Test Time:    18:44:49

Pat Name:     PARISH RICO      Department:   

Patient ID:   [id number]           Room:          

Gender:       F                        Technician:   

:          1950               Requested By: JACOBY MERCEDES

Order Number: [id number]            Reading MD:     

                                 Measurements

Intervals                              Axis          

Rate:         79                       P:            44

TN:           148                      QRS:          -11

QRSD:         124                      T:            89

QT:           404                                    

QTc:          464                                    

                           Interpretive Statements

SINUS RHYTHM

LEFTWARD AXIS

T ABNORMALITY IN HIGH LATERAL LEADS

ABNORMAL ECG

RI6.01

No previous ECG available for comparison

## 2020-02-06 NOTE — PHYS DOC
Past History


Past Medical History:  Anxiety, Asthma, COPD, GERD, High Cholesterol


Past Surgical History:  Tonsillectomy, Tubal ligation, Other


Additional Past Surgical Histo:  3 stents placed; esophageal strictures stretch;

fx left wrist repair


Smoking:  Quit Greater Than 1 Year


Alcohol Use:  Occasionally


Drug Use:  None





Adult General


Chief Complaint


Chief Complaint:  COUGH





HPI


HPI





Patient is a 69 year old female who presents with complaint of cough and 

shortness of breath.  Patient notes that her cough started worsening 4 days ago.

 Has history of COPD, GERD, and coronary artery disease with chronic left bundle

branch block.  Denies any chest pain but states that she is had tightness and 

difficulty breathing.  Increased wheezing.  Cough has been productive of 

yellowish whitish sputum.  Last used albuterol 1 hour prior to arrival with no 

significant relief.  Not currently on daily oral steroid.  Denies fever.  States

that she has significantly worsening symptoms while walking inside her own home.

 States that she is very concerned as she lives alone and that she has gotten 

significantly more short of breath.  She called a friend who helped bring her to

the emergency department for further care.





Review of Systems


Review of Systems





Constitutional: Denies fever or chills []


Eyes: Denies change in visual acuity, redness, or eye pain []


HENT: Nasal congestion, ear pain[]


Respiratory: Cough, shortness of breath, wheezing[]


Cardiovascular: Denies chest pain or edema[]


GI: Denies abdominal pain, nausea, vomiting, bloody stools or diarrhea []


: Denies dysuria or hematuria []


Musculoskeletal: Denies back pain or joint pain []


Integument: Denies rash or skin lesions []


Neurologic: Denies headache, focal weakness or sensory changes []





All other systems were reviewed and found to be within normal limits, except as 

documented in this note.





Current Medications


Current Medications





Current Medications








 Medications


  (Trade)  Dose


 Ordered  Sig/Mar  Start Time


 Stop Time Status Last Admin


Dose Admin


 


 Albuterol Sulfate


  (Ventolin)  2.5 mg  1X  ONCE  2/6/20 18:45


 2/6/20 18:46 DC 2/6/20 18:56


2.5 MG


 


 Albuterol/


 Ipratropium


  (Duoneb)  3 ml  STK-MED ONCE  2/6/20 18:36


 2/6/20 18:37 DC  





 


 Methylprednisolone


 Sodium Succinate


  (SOLU-Medrol


 125MG VIAL)  125 mg  1X  ONCE  2/6/20 18:45


 2/6/20 18:46 DC 2/6/20 18:48


125 MG


 


 Sodium Chloride  1,000 ml @ 


 1,000 mls/hr  Q1H  2/6/20 18:32


 2/6/20 19:31  2/6/20 18:46


1,000 MLS/HR











Allergies


Allergies





Allergies








Coded Allergies Type Severity Reaction Last Updated Verified


 


  venom-honey bee Allergy Severe Shortness of Air 2/6/20 Yes


 


  adhesive Allergy Intermediate  2/6/20 Yes


 


  cimetidine Allergy Intermediate "toxic" 2/6/20 Yes


 


  cimetidine HCl Allergy Intermediate "toxic" 2/6/20 Yes











Physical Exam


Physical Exam





Constitutional: Alert, afebrile, appears in moderate respiratory distress. []


HENT: Normocephalic, atraumatic, bilateral external ears normal, oropharynx 

moist, no oral exudates, nose normal. []


Eyes: PERRLA, EOMI, conjunctiva normal, no discharge. [] 


Neck: Normal range of motion, no tenderness, supple, no stridor. [] 


Cardiovascular:Heart rate regular rhythm, no murmur []


Lungs & Thorax: Prolonged expiratory phase, expiratory wheezes bilaterally, no 

rales, mild accessory muscle usage present[]


Abdomen: Bowel sounds normal, soft, no tenderness, no masses, no pulsatile 

masses. [] 


Skin: Warm, dry, no erythema, no rash. [] 


Back: No tenderness, no CVA tenderness. [] 


Extremities: No tenderness, no cyanosis, no clubbing, ROM intact, no edema. [] 


Neurologic: Alert and oriented X 3, normal motor function, normal sensory fun

ction, no focal deficits noted. []





Current Patient Data


Vital Signs





                                   Vital Signs








  Date Time  Temp Pulse Resp B/P (MAP) Pulse Ox O2 Delivery O2 Flow Rate FiO2


 


2/6/20 18:57     94 Room Air  


 


2/6/20 18:50  91 28 160/83 (108)    


 


2/6/20 18:01 98.4       








Lab Results





Laboratory Tests








Test


 2/6/20


18:40 2/6/20


18:45


 


White Blood Count 9.7 x10^3/uL  


 


Red Blood Count 5.24 x10^6/uL  


 


Hemoglobin 15.3 g/dL  


 


Hematocrit 47.3 %  


 


Mean Corpuscular Volume 90 fL  


 


Mean Corpuscular Hemoglobin 29 pg  


 


Mean Corpuscular Hemoglobin


Concent 32 g/dL 


 





 


Red Cell Distribution Width 14.2 %  


 


Platelet Count 278 x10^3/uL  


 


Neutrophils (%) (Auto) 71 %  


 


Lymphocytes (%) (Auto) 14 %  


 


Monocytes (%) (Auto) 7 %  


 


Eosinophils (%) (Auto) 8 %  


 


Basophils (%) (Auto) 1 %  


 


Neutrophils # (Auto) 6.9 x10^3uL  


 


Lymphocytes # (Auto) 1.3 x10^3/uL  


 


Monocytes # (Auto) 0.7 x10^3/uL  


 


Eosinophils # (Auto) 0.7 x10^3/uL  


 


Basophils # (Auto) 0.1 x10^3/uL  


 


Sodium Level 143 mmol/L  


 


Potassium Level 3.8 mmol/L  


 


Chloride Level 106 mmol/L  


 


Carbon Dioxide Level 26 mmol/L  


 


Anion Gap 11  


 


Blood Urea Nitrogen 7 mg/dL  


 


Creatinine 0.8 mg/dL  


 


Estimated GFR


(Cockcroft-Gault) 71.5 


 





 


BUN/Creatinine Ratio 9  


 


Glucose Level 90 mg/dL  


 


Calcium Level 8.9 mg/dL  


 


Total Bilirubin 0.5 mg/dL  


 


Aspartate Amino Transf


(AST/SGOT) 18 U/L 


 





 


Alanine Aminotransferase


(ALT/SGPT) 23 U/L 


 





 


Alkaline Phosphatase 60 U/L  


 


Creatine Kinase 73 U/L  


 


Creatine Kinase MB (Mass) 1.1 ng/mL  


 


Creatine Kinase MB Relative


Index 1.5 % 


 





 


Troponin I Quantitative < 0.017 ng/mL  


 


NT-Pro-B-Type Natriuretic


Peptide 116 pg/mL 


 





 


Total Protein 7.2 g/dL  


 


Albumin 3.5 g/dL  


 


Albumin/Globulin Ratio 0.9  


 


Influenza Type A (Rapid)  Negative 


 


Influenza Type B (Rapid)  Negative 








Current Medications








 Medications


  (Trade)  Dose


 Ordered  Sig/Mar


 Route


 PRN Reason  Start Time


 Stop Time Status Last Admin


Dose Admin


 


 Methylprednisolone


 Sodium Succinate


  (SOLU-Medrol


 125MG VIAL)  125 mg  STK-MED ONCE


 .ROUTE


   2/6/20 18:32


 2/6/20 18:33 DC  





 


 Sodium Chloride  1,000 ml @ 


 1,000 mls/hr  Q1H


 IV


   2/6/20 18:32


 2/6/20 19:31 DC 2/6/20 18:46





 


 Albuterol/


 Ipratropium


  (Duoneb)  3 ml  1X  ONCE


 NEB


   2/6/20 18:45


 2/6/20 18:46 DC 2/6/20 18:57





 


 Methylprednisolone


 Sodium Succinate


  (SOLU-Medrol


 125MG VIAL)  125 mg  1X  ONCE


 IV


   2/6/20 18:45


 2/6/20 18:46 DC 2/6/20 18:48





 


 Albuterol Sulfate


  (Ventolin)  2.5 mg  1X  ONCE


 NEB


   2/6/20 18:45


 2/6/20 18:46 DC 2/6/20 18:56





 


 Albuterol/


 Ipratropium


  (Duoneb)  3 ml  STK-MED ONCE


 .ROUTE


   2/6/20 18:36


 2/6/20 18:37 DC  





 


 Acetaminophen


  (Tylenol)  650 mg  1X  ONCE


 PO


   2/6/20 20:00


 2/6/20 20:01 DC 2/6/20 20:01





 


 Acetaminophen


  (Tylenol)  325 mg  STK-MED ONCE


 PO


   2/6/20 19:59


 2/6/20 19:59 DC  














EKG


EKG


Interpreted by me: Heart rate 79 sinus rhythm, left bundle branch block, no 

acute ST elevations or depressions[]





Radiology/Procedures


Radiology/Procedures


One view AP chest x-ray interpreted by me: No infiltrate, no effusions, normal 

cardiac silhouette[]





Course & Med Decision Making


Course & Med Decision Making


Pertinent Labs and Imaging studies reviewed. (See chart for details)





The patient was given IV Solu-Medrol, one unit dose of DuoNeb per nebulizer, and

 one unit dose of albuterol sulfate per nebulizer in the emergency department.  

Patient notes slight improvement in symptoms.  Patient attempted ambulation in 

the emergency department with worsening shortness of breath and coughing which 

improves while at rest.  Patient's oxygen saturation saturations during mild 

exertion do drop to 88% but improved while at rest to 94%.  Patient lives by 

herself and states that she does not feel comfortable going back home and her 

current state as she does not feel well enough to take care of herself.  Patient

 will be admitted to the hospital for continued treatment of acute COPD 

exacerbation.  I spoke with Dr. Little who will except care patient in hospital 

for continued care. []





Dragon Disclaimer


Dragon Disclaimer


This electronic medical record was generated, in whole or in part, using a voice

 recognition dictation system.





Departure


Departure:


Impression:  


   Primary Impression:  


   COPD with acute exacerbation


Disposition:  09 ADMITTED AS INPATIENT


Admitting Physician:  Yogesh Little


Condition:  STABLE


Referrals:  


PB SAENZ MD (PCP)











JACOBY MERCEDES MD                Feb 6, 2020 19:06

## 2020-02-07 VITALS — DIASTOLIC BLOOD PRESSURE: 86 MMHG | SYSTOLIC BLOOD PRESSURE: 144 MMHG

## 2020-02-07 VITALS — SYSTOLIC BLOOD PRESSURE: 119 MMHG | DIASTOLIC BLOOD PRESSURE: 77 MMHG

## 2020-02-07 VITALS — SYSTOLIC BLOOD PRESSURE: 129 MMHG | DIASTOLIC BLOOD PRESSURE: 78 MMHG

## 2020-02-07 VITALS — DIASTOLIC BLOOD PRESSURE: 76 MMHG | SYSTOLIC BLOOD PRESSURE: 129 MMHG

## 2020-02-07 VITALS — SYSTOLIC BLOOD PRESSURE: 144 MMHG | DIASTOLIC BLOOD PRESSURE: 86 MMHG

## 2020-02-07 VITALS — SYSTOLIC BLOOD PRESSURE: 119 MMHG | DIASTOLIC BLOOD PRESSURE: 70 MMHG

## 2020-02-07 LAB
ANION GAP SERPL CALC-SCNC: 12 MMOL/L (ref 6–14)
BASOPHILS # BLD AUTO: 0 X10^3/UL (ref 0–0.2)
BASOPHILS NFR BLD: 0 % (ref 0–3)
CA-I SERPL ISE-MCNC: 8 MG/DL (ref 7–20)
CALCIUM SERPL-MCNC: 8.5 MG/DL (ref 8.5–10.1)
CHLORIDE SERPL-SCNC: 107 MMOL/L (ref 98–107)
CO2 SERPL-SCNC: 24 MMOL/L (ref 21–32)
CREAT SERPL-MCNC: 0.7 MG/DL (ref 0.6–1)
EOSINOPHIL NFR BLD: 0 % (ref 0–3)
EOSINOPHIL NFR BLD: 0 X10^3/UL (ref 0–0.7)
ERYTHROCYTE [DISTWIDTH] IN BLOOD BY AUTOMATED COUNT: 14.1 % (ref 11.5–14.5)
GFR SERPLBLD BASED ON 1.73 SQ M-ARVRAT: 83.5 ML/MIN
GLUCOSE SERPL-MCNC: 170 MG/DL (ref 70–99)
HCT VFR BLD CALC: 44 % (ref 36–47)
HGB BLD-MCNC: 14.2 G/DL (ref 12–15.5)
LYMPHOCYTES # BLD: 0.6 X10^3/UL (ref 1–4.8)
LYMPHOCYTES NFR BLD AUTO: 8 % (ref 24–48)
MCH RBC QN AUTO: 29 PG (ref 25–35)
MCHC RBC AUTO-ENTMCNC: 32 G/DL (ref 31–37)
MCV RBC AUTO: 90 FL (ref 79–100)
MONO #: 0 X10^3/UL (ref 0–1.1)
MONOCYTES NFR BLD: 1 % (ref 0–9)
NEUT #: 6.4 X10^3UL (ref 1.8–7.7)
NEUTROPHILS NFR BLD AUTO: 91 % (ref 31–73)
PLATELET # BLD AUTO: 277 X10^3/UL (ref 140–400)
POTASSIUM SERPL-SCNC: 3.8 MMOL/L (ref 3.5–5.1)
RBC # BLD AUTO: 4.9 X10^6/UL (ref 3.5–5.4)
SODIUM SERPL-SCNC: 143 MMOL/L (ref 136–145)
WBC # BLD AUTO: 7.1 X10^3/UL (ref 4–11)

## 2020-02-07 RX ADMIN — METHYLPREDNISOLONE SODIUM SUCCINATE SCH MG: 125 INJECTION, POWDER, FOR SOLUTION INTRAMUSCULAR; INTRAVENOUS at 05:50

## 2020-02-07 RX ADMIN — METHYLPREDNISOLONE SODIUM SUCCINATE SCH MG: 125 INJECTION, POWDER, FOR SOLUTION INTRAMUSCULAR; INTRAVENOUS at 00:07

## 2020-02-07 RX ADMIN — HYDROCODONE BITARTRATE AND ACETAMINOPHEN SCH TAB: 5; 325 TABLET ORAL at 07:46

## 2020-02-07 RX ADMIN — METHYLPREDNISOLONE SODIUM SUCCINATE SCH MG: 125 INJECTION, POWDER, FOR SOLUTION INTRAMUSCULAR; INTRAVENOUS at 19:51

## 2020-02-07 RX ADMIN — HYDROCODONE BITARTRATE AND ACETAMINOPHEN SCH TAB: 5; 325 TABLET ORAL at 21:37

## 2020-02-07 RX ADMIN — HYDROCODONE BITARTRATE AND ACETAMINOPHEN SCH TAB: 5; 325 TABLET ORAL at 14:00

## 2020-02-07 RX ADMIN — IPRATROPIUM BROMIDE AND ALBUTEROL SULFATE SCH ML: .5; 3 SOLUTION RESPIRATORY (INHALATION) at 12:00

## 2020-02-07 RX ADMIN — IPRATROPIUM BROMIDE AND ALBUTEROL SULFATE SCH ML: .5; 3 SOLUTION RESPIRATORY (INHALATION) at 08:29

## 2020-02-07 RX ADMIN — METHYLPREDNISOLONE SODIUM SUCCINATE SCH MG: 125 INJECTION, POWDER, FOR SOLUTION INTRAMUSCULAR; INTRAVENOUS at 12:00

## 2020-02-07 RX ADMIN — HYDROCODONE BITARTRATE AND ACETAMINOPHEN SCH TAB: 5; 325 TABLET ORAL at 00:06

## 2020-02-07 RX ADMIN — IPRATROPIUM BROMIDE AND ALBUTEROL SULFATE SCH ML: .5; 3 SOLUTION RESPIRATORY (INHALATION) at 20:44

## 2020-02-07 RX ADMIN — METHYLPREDNISOLONE SODIUM SUCCINATE SCH MG: 125 INJECTION, POWDER, FOR SOLUTION INTRAMUSCULAR; INTRAVENOUS at 23:40

## 2020-02-07 RX ADMIN — METOPROLOL TARTRATE SCH MG: 25 TABLET, FILM COATED ORAL at 21:36

## 2020-02-07 RX ADMIN — BUDESONIDE SCH MG: 0.5 SUSPENSION RESPIRATORY (INHALATION) at 20:44

## 2020-02-07 RX ADMIN — IPRATROPIUM BROMIDE AND ALBUTEROL SULFATE SCH ML: .5; 3 SOLUTION RESPIRATORY (INHALATION) at 15:39

## 2020-02-07 NOTE — HP
ADMIT DATE:  2020



HISTORY OF PRESENT ILLNESS:  The patient is 69-year-old  female patient

who came to the Emergency Room with complaint of cough and shortness of breath. 

The cough initially was dry and now it is productive with scanty yellowish

sputum.  She denied any chest pain.  Did complain of chest tightness and

difficulty breathing, increased wheezing.  She has been using her inhalers

without any significant relief.  She is not on any steroids.  Denied any fever. 

She has had significant worsening symptoms while walking inside her own home. 

She said she is very concerned that she lives alone and that she has gotten

significantly more short of breath.  She called her friend who helped bring her

to the Emergency Department for further care.  In the Emergency Room, she was

extensively evaluated.  Her lab work showed that her white cell count was

normal.  Her chemistry was unremarkable.  Her influenza A and B were negative. 

Her chest x-ray showed that the heart is not enlarged.  Mediastinal hilar

contours are normal.  Linear opacity with left lung base, likely scarring

atelectasis, no pneumothorax.  Trace blunting, left costophrenic angle likely

trace left pleural effusion.  The patient was admitted with COPD exacerbation. 

She was started on steroids and albuterol.  She was given a liter of normal

saline and was admitted to continue with bronchodilator and steroids.



PAST MEDICAL HISTORY:  Significant for chronic obstructive pulmonary disease,

although she has never required intubation and mechanical ventilation,

gastroesophageal reflux disease, esophageal stricture that was dilated once

before.  She has hyperlipidemia, hypertension, chronic left bundle-branch block,

coronary artery disease, status post PCI with stent deployment x 3.



PAST SURGICAL HISTORY:  Significant for PCI with stent deployment x 3; left

wrist fracture, status post open reduction and internal fixation; tubal

ligation; tonsillectomy; exploratory laparotomy for endometriosis; bilateral

total knee arthroplasty; EGD.  She is scheduled also for colonoscopy.



ALLERGIES:  SHE IS ALLERGIC TO ADHESIVES, CIMETIDINE AND HONEY BEE VENOM.



MEDICATIONS:  She is currently on following medications:  She is on

diphenhydramine 25 mg every 6 hours, albuterol sulfate or ProAir 2 puffs every 4

hours, Plavix 75 mg daily, atorvastatin calcium 20 mg at bedtime, metoprolol

tartrate 12.5 mg twice a day, hydrocodone/APAP 5/325 three times a day,

paroxetine 20 mg daily, hydroxyzine 75 mg 4 times a day, Trelegy Ellipta 1 puff

once a day, montelukast sodium (Singulair) 10 mg once a day at bedtime.  She is

on Protonix 40 mg once a day and estradiol cream vaginally daily.



FAMILY HISTORY:  She has no full brothers or sisters.  Her father had  in

his 80s because of cerebral aneurysm.  Mother is alive at the age of 90, has

metastatic breast cancer and congestive heart failure.



SOCIAL HISTORY:  She is .  She has 2 daughters who live out of the state.

 She is an ex-smoker, quit 12 years ago.  She smoked for about 40 years.  She

smokes between half to 1 pack a day, drinks alcohol on the weekend.  She drinks

2 beers and shots, does not use any drugs.  She worked as a demo and in selling

products. 



REVIEW OF SYSTEMS:  The patient denied any blurring of vision, cataract,

glaucoma or macular degeneration.  Denied any earache, tinnitus or sensorineural

deafness.  Denied any nosebleeds, stuffy nose or postnasal drip.  Denied any

sore throat, sore tongue, toothache, hoarseness of voice.  Did complain of

difficulty swallowing to both solids and liquids.  Denied any weight loss. 

Denied any nausea, vomiting, diarrhea or constipation.  Denied any hematemesis,

melena or hematochezia.  Denied any dysuria, frequency or hematuria.  Denied any

chest pain.  Did complain of shortness of breath, cough with yellowish sputum.



PHYSICAL EXAMINATION:

GENERAL:  On arrival to the Emergency Room, she was clearly tachypneic with no

pallor, jaundice, cyanosis or thyromegaly.  No jugular venous distention or limb

edema.

VITAL SIGNS:  Her heart rate was 88, blood pressure 143/54, temperature 98.4,

respiratory rate 25, and oxygen saturation was 88% on room air.

HEAD, EYES, EARS, NOSE AND THROAT:  Showed normocephalic, atraumatic.

NECK:  Supple.

HEART:  Showed normal first and second heart sounds.  No gallop, rub or murmur.

CHEST:  Shows central trachea, equally reduced expansion, reduced air entry,

vesicular breath sounds with prolonged expiratory phase and wheezes.  Extensive

wheezes bilaterally.  No crepitation.  She was also using her accessory muscles.

ABDOMEN:  Distended, soft, nontender.

NEUROLOGIC:  She was awake, alert, responding appropriately.  All cranial nerves

intact.

EXTREMITIES:  She moves extremities without difficulty.



LABORATORY DATA:  While in the Emergency Room, she has had lab work done, which

showed a white cell count 9700, hemoglobin 15, hematocrit 43, MCV 90, and

platelet count of 278,000.  Her serum sodium was 143, potassium 3.8, chloride

106, bicarbonate 26, anion gap of 11, BUN 7, creatinine 0.8, estimated GFR was

71 mL per minute.  Her glucose was 90, calcium was 8.9.  Total bilirubin, AST,

ALT, alkaline phosphatase were normal.  Total protein was 7.2, albumin was 3.5. 

Her beta natriuretic peptide was only 116.  Her first troponin was less than

0.017.  Her serology showed influenza A and B were negative.  Her chest x-ray

showed that the heart is not enlarged.  Mediastinal hilar contours are normal. 

Linear opacities in the left lung base, likely scarring atelectasis.  There is

no pneumothorax.  Trace blunting with the left costophrenic angle, likely trace

left pleural effusion.



ASSESSMENT AND PLAN:  The patient was admitted with acute chronic obstructive

pulmonary disease exacerbation.  She was treated with IV steroids and

bronchodilators.  We will continue with Solu-Medrol and bronchodilator.  We will

resume all her medication and monitor her on a daily basis and when her symptoms

will improve and wheezing disappears, then we will discharge her home.





______________________________

ROSA HAYNES MD



DR:  FLORY/greg  JOB#:  [id number] / [id number]

DD:  2020 16:10  DT:  2020 17:02

## 2020-02-07 NOTE — PN
DATE:  02/07/2020



SUBJECTIVE:  The patient is resting, slightly propped up in bed, in no apparent

distress.  She is feeling much better, continued to have some cough with scanty

yellowish sputum and chest tightness is much improved.  She continued to have

shortness of breath on exertion.



PHYSICAL EXAMINATION:

GENERAL:  When I examined her this afternoon, she looked well and was clearly in

no apparent respiratory distress.  No pallor, jaundice, cyanosis or thyromegaly.

 No jugular venous distention.  No lower limb edema.

VITAL SIGNS:  Her heart rate was 81, blood pressure was 119/77, temperature was

97.5, respiratory rate 20, and oxygen saturation was 95% on 1 liter of oxygen.

HEAD, EYES, EARS, NOSE AND THROAT:  Showed normocephalic, atraumatic.

NECK:  Supple.

HEART:  Showed normal first and second heart sounds.  No gallop or murmur.

CHEST:  Shows central trachea, equally bilaterally, equal chest expansion,

reduced air entry, vesicular sounds with a few bilateral scattered rhonchi.  I

could not appreciate any crepitation.

ABDOMEN:  Distended, soft, nontender.

NEUROLOGIC:  She is awake, alert, responding appropriately.  All cranial nerves

are intact.  She moves extremities without difficulty.  She ambulates without

assistance or assistive devices.



Her intake over the last 24 hours was 1465, no output was recorded.



LABORATORY DATA:  Her lab work this morning showed a white cell count 7100,

hemoglobin 14, hematocrit 44, MCV 90 and platelet count 277,000.  Serum sodium

143, potassium 3.8, chloride 107, bicarbonate 24, anion gap of 12, BUN 8,

creatinine 0.7, estimated GFR was 83 mL per minute.  Her glucose 170, calcium

was 8.5.



ASSESSMENT:  This is a 69-year-old  female patient who was admitted

with chronic obstructive pulmonary disease exacerbation.  She has a multitude of

other medical problems including coronary artery disease, status post PCI with

stent deployment, hypertension, hyperlipidemia, chronic left bundle-branch

block, esophageal stricture and gastroesophageal reflux disease.



PLAN:  To continue with steroids and bronchodilator.  We will evaluate her

tomorrow.  If feeling better, she can be discharged home tomorrow.





______________________________

ROSA HAYNES MD



DR:  FLORY/greg  JOB#:  [id number] / [id number]

DD:  02/07/2020 16:16  DT:  02/07/2020 21:59

## 2020-02-08 VITALS — SYSTOLIC BLOOD PRESSURE: 113 MMHG | DIASTOLIC BLOOD PRESSURE: 69 MMHG

## 2020-02-08 VITALS — SYSTOLIC BLOOD PRESSURE: 114 MMHG | DIASTOLIC BLOOD PRESSURE: 68 MMHG

## 2020-02-08 RX ADMIN — HYDROCODONE BITARTRATE AND ACETAMINOPHEN SCH TAB: 5; 325 TABLET ORAL at 08:50

## 2020-02-08 RX ADMIN — IPRATROPIUM BROMIDE AND ALBUTEROL SULFATE SCH ML: .5; 3 SOLUTION RESPIRATORY (INHALATION) at 05:20

## 2020-02-08 RX ADMIN — METHYLPREDNISOLONE SODIUM SUCCINATE SCH MG: 125 INJECTION, POWDER, FOR SOLUTION INTRAMUSCULAR; INTRAVENOUS at 11:02

## 2020-02-08 RX ADMIN — METHYLPREDNISOLONE SODIUM SUCCINATE SCH MG: 125 INJECTION, POWDER, FOR SOLUTION INTRAMUSCULAR; INTRAVENOUS at 05:31

## 2020-02-08 RX ADMIN — HYDROCODONE BITARTRATE AND ACETAMINOPHEN SCH TAB: 5; 325 TABLET ORAL at 13:47

## 2020-02-08 RX ADMIN — BUDESONIDE SCH MG: 0.5 SUSPENSION RESPIRATORY (INHALATION) at 11:00

## 2020-02-08 RX ADMIN — METOPROLOL TARTRATE SCH MG: 25 TABLET, FILM COATED ORAL at 08:50

## 2020-02-08 RX ADMIN — IPRATROPIUM BROMIDE AND ALBUTEROL SULFATE SCH ML: .5; 3 SOLUTION RESPIRATORY (INHALATION) at 11:00

## 2020-02-08 NOTE — DS
DATE OF DISCHARGE:  



HOSPITAL COURSE:  The patient is a 69-year-old  female patient who was

admitted with a complaint of cough and shortness of breath that was initially

dry and complex with scanty yellowish sputum.  Denied any chest pain.  Did

complain of chest tightness and difficulty breathing and increased wheezing. 

She has been using her inhalers without any significant relief.  She was not on

any steroids.  Denied any fever.  She has had significant worsening symptoms

while walking inside her own house and therefore she came to the Emergency Room

where she was evaluated.  Her influenza A and B were negative.  Chest x-ray

showed the heart is not enlarged.  Mediastinal and hilar contours are normal. 

Linear opacity in the left lung base, likely scarring atelectasis, no

pneumothorax.  She was admitted with diagnosis of COPD exacerbation, was started

on steroids as well as nebulized albuterol and Atrovent and she actually did

very well.



PHYSICAL EXAMINATION:

GENERAL:  When I saw her this afternoon, she was sitting on the edge of the bed

comfortably, in no apparent distress.  No pallor, jaundice, cyanosis or

thyromegaly.  No jugular venous distention.  No lower limb edema.

VITAL SIGNS:  Her heart rate was 75, blood pressure was 113/69, temperature was

98.1, respiratory rate was 16, and oxygen saturation was 94% on room air.

HEAD, EYES, EARS, NOSE AND THROAT:  Showed normocephalic, atraumatic.

NECK:  Supple.

HEART:  Showed normal first and second heart sounds.  No gallop or murmur.

CHEST:  Showed central trachea, equal bilateral expansion, air entry, vesicular

sounds.  No crepitation or rhonchi.

ABDOMEN:  Distended, soft, nontender.

NEUROLOGIC:  She is awake, alert, responding appropriately.  All cranial nerves

intact.  She moves extremities without difficulty.  She has ambulated throughout

the corridors of hospital without difficulty without dropping her oxygen.



LABORATORY DATA:  Showed that her white cell count was 7000, hemoglobin 14,

hematocrit 44, MCV 90 and platelet count 277,000.  Her serum sodium was 143,

potassium 3.8, chloride 107, bicarbonate 24, anion gap of 12, BUN 8, creatinine

0.7, estimated GFR was 83 mL per minute.  Her glucose 170, calcium was 8.5.  Her

influenza A and B were negative.



DISCHARGE MEDICATIONS:  The patient was discharged home to continue a tapering

course of steroids, should continue on all other medication including

diphenhydramine 25 mg every 6 hours as needed, albuterol sulfate inhaler 2 puffs

every 4 hours, Plavix 75 mg once a day, atorvastatin 10 mg at bedtime,

metoprolol tartrate 12.5 mg twice a day, hydrocodone/APAP 5/325 one tablet 3

times a day.  She is on paroxetine 20 mg once a day, hydroxyzine 75 mg 4 times a

day, Trelegy Ellipta 1 puff once a day, montelukast 10 mg at bedtime, Protonix

40 mg once a day, estradiol 42.5 grams cream vaginally daily.



FINAL DISCHARGE DIAGNOSES:  Chronic obstructive pulmonary disease exacerbation,

gastroesophageal reflux disease, esophageal stricture that was dilated once

before, hyperlipidemia, hypertension, chronic left bundle-branch block, coronary

artery disease, status post PCI with stent deployment.





______________________________

ROSA HAYNES MD



DR:  FLORY/greg  JOB#:  [id number] / [id number]

DD:  02/08/2020 14:41  DT:  02/08/2020 15:19

## 2020-06-12 ENCOUNTER — HOSPITAL ENCOUNTER (EMERGENCY)
Dept: HOSPITAL 63 - ER | Age: 70
Discharge: HOME | End: 2020-06-12
Payer: MEDICARE

## 2020-06-12 VITALS — WEIGHT: 170.86 LBS | BODY MASS INDEX: 34.44 KG/M2 | HEIGHT: 59 IN

## 2020-06-12 VITALS — DIASTOLIC BLOOD PRESSURE: 89 MMHG | SYSTOLIC BLOOD PRESSURE: 148 MMHG

## 2020-06-12 DIAGNOSIS — Z91.030: ICD-10-CM

## 2020-06-12 DIAGNOSIS — S93.401A: Primary | ICD-10-CM

## 2020-06-12 DIAGNOSIS — E78.00: ICD-10-CM

## 2020-06-12 DIAGNOSIS — J44.9: ICD-10-CM

## 2020-06-12 DIAGNOSIS — K21.9: ICD-10-CM
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
## 2020-06-12 DIAGNOSIS — Z87.891: ICD-10-CM
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
## 2020-06-12 DIAGNOSIS — Z88.8: ICD-10-CM

## 2020-06-12 PROCEDURE — 73610 X-RAY EXAM OF ANKLE: CPT

## 2020-06-12 PROCEDURE — 99284 EMERGENCY DEPT VISIT MOD MDM: CPT

## 2020-06-12 PROCEDURE — 29515 APPLICATION SHORT LEG SPLINT: CPT

## 2020-06-12 NOTE — PHYS DOC
Past History


Past Medical History:  Anxiety, Asthma, COPD, GERD, High Cholesterol


Past Surgical History:  Tonsillectomy, Tubal ligation, Other


Additional Past Surgical Histo:  3 stents placed; esophageal strictures stretch;

fx left wrist repair


Smoking:  Quit Greater Than 1 Year


Alcohol Use:  Occasionally


Drug Use:  None





General Adult


EDM:


Chief Complaint:  ANKLE PROBLEM





HPI:


HPI:


".. I was stepping out pickup last night.. I was in the back.. and I inverted 

this Rt. ankle.. it still hurts this morning.. I ve been using my walker... but 

it still hurts.. and now more swollen and turning purple..."








Patient is a 69 year old female who presents with above hx and complaints injury

to right ankle last night.  Patient has persistent swelling ecchymosis and pain.

 Patient has been weightbearing with pain.  Patient has multiple medical issues.

 Patient follows with Dr. medley for cardiology, Dr. Simpson for orthopedics, Dr. Subramanian for GI and Dr. Aaron Luu for primary.  Pt. denies any travel or specific

ill contacts.  No history of immunosuppression.  Patient denies any history of 

osteoporosis





Review of Systems:


Review of Systems:


Constitutional:  Denies fever or chills 


Eyes:  Denies change in visual acuity 


HENT:  Denies nasal congestion or sore throat 


Respiratory:  Denies cough or shortness of breath 


Cardiovascular:  Denies chest pain or edema 


GI:  Denies abdominal pain, nausea, vomiting, bloody stools or diarrhea 


: Denies dysuria 


Musculoskeletal:  Denies back pain or joint pain 


Integument:  Denies rash 


Neurologic:  Denies headache, focal weakness or sensory changes 


Endocrine:  Denies polyuria or polydipsia 


Lymphatic:  Denies swollen glands 


Psychiatric:  Denies depression or anxiety





Heart Score:


Risk Factors:


Risk Factors:  DM, Current or recent (<one month) smoker, HTN, HLP, family 

history of CAD, obesity.


Risk Scores:


Score 0 - 3:  2.5% MACE over next 6 weeks - Discharge Home


Score 4 - 6:  20.3% MACE over next 6 weeks - Admit for Clinical Observation


Score 7 - 10:  72.7% MACE over next 6 weeks - Early Invasive Strategies





Family History:


Family History:


Noncontributory





Current Medications:


Current Meds:


See nursing for home meds





Allergies:


Allergies:





Allergies








Coded Allergies Type Severity Reaction Last Updated Verified


 


  venom-honey bee Allergy Severe Shortness of Air 2/6/20 Yes


 


  adhesive Allergy Intermediate  20 Yes


 


  cimetidine Allergy Intermediate "toxic" 20 Yes


 


  cimetidine HCl Allergy Intermediate "toxic" 20 Yes











Physical Exam:


PE:





Constitutional: Well developed, well nourished, no acute distress, non-toxic 

appearance. []


HENT: Normocephalic, atraumatic, bilateral external ears normal, oropharynx 

moist, no oral exudates, nose normal. []


Eyes: PERRLA, EOMI, conjunctiva normal, no discharge. [] 


Neck: Normal range of motion, no tenderness, supple, no stridor. [] 


Cardiovascular:Heart rate regular rhythm, no murmur []


Lungs & Thorax:  Bilateral breath sounds clear to auscultation []


Abdomen: Bowel sounds normal, soft, no tenderness, no masses, no pulsatile 

masses. [] 


Skin: Warm, dry, no erythema, no rash. [] 


Back: No tenderness, no CVA tenderness. [] 


Extremities: No tenderness, no cyanosis, no clubbing, ROM intact, no edema. [] 


Neurologic: Alert and oriented X 3, normal motor function, normal sensory 

function, no focal deficits noted. []


Psychologic: Affect normal, judgement normal, mood normal. []





EKG:


EKG:


[]





Radiology/Procedures:


Radiology/Procedures:


[][facility] [address], [place] [address]


                                 [phone]


                                        


                                 IMAGING REPORT





                                     Signed





PATIENT: [place] LACCOUNT: [id number]     MRN#: [id number]


: 1950           LOCATION: ER              AGE: 69


SEX: F                    EXAM DT: 20         ACCESSION#: [id number]


STATUS: PRE ER            ORD. PHYSICIAN: JESÚS LEIGH MD


REASON: Right ankle injury 20, pain


PROCEDURE: ANKLE RIGHT 3V





 


Right ANKLE AP, LATERAL, OBLIQUE


 


Clinical Indication: Reason: Right ankle injury 20, pain / 


 


Comparison: None.


 


Findings: 


There is no acute fracture or dislocation. 


Mineralization is normal. Joint spaces are maintained. 


Mild lateral ankle soft tissue swelling. There is a thin sclerotic 


metaphyseal band of the distal tibia of uncertain significance.


The ankle mortise is intact.  There is no obvious ankle joint effusion.   


 


IMPRESSION:


No acute fracture. 


 


Electronically signed by: Martell Arcos MD (2020 9:20 PM) [facility]














DICTATED AND SIGNED BY:     MARTELL ARCOS MD


DATE:     20





CC: PB SAENZ MD; JESÚS LEIGH MD ~





Course & Med Decision Making:


Course & Med Decision Making


Pertinent Labs and Imaging studies reviewed. (See chart for details)





Ice, elevation, rest, splint, use crutches or walker.   Re- xray in two weeks if

 still pain.   Evaluation fracture not evident on current film.  Follow up with 

primary.  Tylenol and Ibuprofen for pain.   Marked pain take Vicoprofen.  





1. Rt. ankle sprain





[]





Dragon Disclaimer:


Dragon Disclaimer:


This electronic medical record was generated, in whole or in part, using a voice

 recognition dictation system.





Departure


Departure:


Disposition:  01 HOME/RESIDENCE PRIOR TO ADM


Condition:  STABLE


Referrals:  


PB SAENZ MD (PCP)


Scripts


Hydrocodone/Ibuprofen (HYDROCODONE-IBUPROFEN 7.5-200  **) 1 Each Tablet


1 TAB PO PRN Q6HRS PRN for PAIN, #30 TAB 0 Refills


   Prov: JESÚS LEIGH MD         20





Justification of Admission:


Justification of Admission:


Justification of Admission Dx:  N/A





Dragon Disclaimer


This chart was dictated in whole or in part using Voice Recognition software in 

a busy, high-work load, and often noisy Emergency Department environment.  It 

may contain unintended and wholly unrecognized errors or omissions.











JESÚS LEIGH MD           2020 20:38

## 2020-06-12 NOTE — RAD
Right ANKLE AP, LATERAL, OBLIQUE

 

Clinical Indication: Reason: Right ankle injury 6/11/20, pain / 

 

Comparison: None.

 

Findings: 

There is no acute fracture or dislocation. 

Mineralization is normal. Joint spaces are maintained. 

Mild lateral ankle soft tissue swelling. There is a thin sclerotic 

metaphyseal band of the distal tibia of uncertain significance.

The ankle mortise is intact.  There is no obvious ankle joint effusion.   

 

IMPRESSION:

No acute fracture. 

 

Electronically signed by: Martell Arcos MD (6/12/2020 9:20 PM) [facility]

## 2020-07-09 ENCOUNTER — HOSPITAL ENCOUNTER (EMERGENCY)
Dept: HOSPITAL 63 - ER | Age: 70
Discharge: HOME | End: 2020-07-09
Payer: MEDICARE

## 2020-07-09 VITALS
DIASTOLIC BLOOD PRESSURE: 71 MMHG | SYSTOLIC BLOOD PRESSURE: 129 MMHG | DIASTOLIC BLOOD PRESSURE: 71 MMHG | SYSTOLIC BLOOD PRESSURE: 129 MMHG

## 2020-07-09 VITALS — BODY MASS INDEX: 34.44 KG/M2 | WEIGHT: 170.86 LBS | HEIGHT: 59 IN

## 2020-07-09 DIAGNOSIS — K21.9: ICD-10-CM

## 2020-07-09 DIAGNOSIS — S52.501A: Primary | ICD-10-CM

## 2020-07-09 DIAGNOSIS — Z87.891: ICD-10-CM

## 2020-07-09 DIAGNOSIS — G89.18: ICD-10-CM

## 2020-07-09 DIAGNOSIS — Z91.030: ICD-10-CM
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
## 2020-07-09 DIAGNOSIS — S52.601A: ICD-10-CM

## 2020-07-09 DIAGNOSIS — E78.00: ICD-10-CM

## 2020-07-09 DIAGNOSIS — J44.9: ICD-10-CM
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
## 2020-07-09 DIAGNOSIS — I25.10: ICD-10-CM

## 2020-07-09 DIAGNOSIS — Z88.8: ICD-10-CM

## 2020-07-09 PROCEDURE — 96372 THER/PROPH/DIAG INJ SC/IM: CPT

## 2020-07-09 PROCEDURE — 99283 EMERGENCY DEPT VISIT LOW MDM: CPT

## 2020-07-09 NOTE — PHYS DOC
Past History


Past Medical History:  Anxiety, Asthma, CAD, COPD, GERD, High Cholesterol, Heart

Disease


Past Surgical History:  Tonsillectomy, Tubal ligation, Other


Additional Past Surgical Histo:  3 stents placed; esophageal strictures stretch;

fx left wrist repair


Smoking:  Quit Greater Than 1 Year


Alcohol Use:  Occasionally


Drug Use:  None





General Adult


EDM:


Chief Complaint:  PAIN CONTROL





HPI:


HPI:


69-year-old female presents with right forearm pain.  The patient broke her 

radius and ulna.  She had surgery about a week ago where plates and screws were 

inserted.  The patient was then placed in a splint and a sling.  She has had 

continual pain since his surgery.  Her pain level is always at least a 4 or 

higher.  She has been on oxycodone 5/325 increased to 2 pills per dose.  She got

a new prescription of oxycodone 7.5/325 yesterday.  She states she is supposed 

to take 1 every 6 hours.  The patient tells me that this is not covering her 

pain.  She has intermittent episodes of stabbing pain.  These bring her to tears

and make it impossible for her to sleep.  She is not scheduled to go back to the

surgeon for another week.  She does not think she can make it that long.  She 

has sensation in all 5 fingers.  She is not always wearing her sling because it 

was irritating her neck.  She denies any new injuries.





Review of Systems:


Review of Systems:


Constitutional:  Denies fever or chills 


Eyes:  Denies change in visual acuity 


HENT:  Denies nasal congestion or sore throat 


Respiratory:  Denies cough or shortness of breath 


Cardiovascular:  Denies chest pain or edema 


GI:  Denies abdominal pain, nausea, vomiting, bloody stools or diarrhea 


: Denies dysuria 


Musculoskeletal: Right forearm pain


Integument:  Denies rash 


Neurologic:  Denies headache, focal weakness or sensory changes 


Endocrine:  Denies polyuria or polydipsia 


Lymphatic:  Denies swollen glands 


Psychiatric:  Denies depression or anxiety





Heart Score:


Risk Factors:


Risk Factors:  DM, Current or recent (<one month) smoker, HTN, HLP, family 

history of CAD, obesity.


Risk Scores:


Score 0 - 3:  2.5% MACE over next 6 weeks - Discharge Home


Score 4 - 6:  20.3% MACE over next 6 weeks - Admit for Clinical Observation


Score 7 - 10:  72.7% MACE over next 6 weeks - Early Invasive Strategies





Allergies:


Allergies:





Allergies








Coded Allergies Type Severity Reaction Last Updated Verified


 


  venom-honey bee Allergy Severe Shortness of Air 2/6/20 Yes


 


  adhesive Allergy Intermediate  2/6/20 Yes


 


  cimetidine Allergy Intermediate "toxic" 2/6/20 Yes


 


  cimetidine HCl Allergy Intermediate "toxic" 2/6/20 Yes











Physical Exam:


PE:





Constitutional: Well developed, well nourished, mild acute distress, non-toxic 

appearance. []


HENT: Normocephalic, atraumatic, bilateral external ears normal, oropharynx 

moist, no oral exudates, nose normal. []


Eyes: PERRLA, EOMI, conjunctiva normal, no discharge. [] 


Neck: Normal range of motion, no tenderness, supple, no stridor. [] 


Cardiovascular:Heart rate regular rhythm, no murmur []


Lungs & Thorax:  Bilateral breath sounds clear to auscultation []


Abdomen: Bowel sounds normal, soft, no tenderness, no masses, no pulsatile 

masses. [] 


Skin: Incisions are clean dry and intact with sutures in place.  Some 

surrounding ecchymosis.  No erythema or warmth.  [] 


Back: No tenderness, no CVA tenderness. [] 


Extremities: Right forearm tender to the touch and pain with and without moving 

the arm.  No cyanosis, no clubbing, ROM intact, no edema. [] 


Neurologic: Alert and oriented X 3, normal motor function, normal sensory 

function, no focal deficits noted. []


Psychologic: Affect normal, judgement normal, mood normal. []





Current Patient Data:


Vital Signs:





                                   Vital Signs








  Date Time  Temp Pulse Resp B/P (MAP) Pulse Ox O2 Delivery O2 Flow Rate FiO2


 


7/9/20 04:09 98.6 67 18 129/71 (90) 96 Room Air  











EKG:


EKG:


[]





Radiology/Procedures:


Radiology/Procedures:


[]





Course & Med Decision Making:


Course & Med Decision Making


Pertinent Labs and Imaging studies reviewed. (See chart for details)


Review of the [place] drug database does confirm the patient's prescriptions are 

as she stated.  During my exam, the patient had several episodes of this 

shooting pain.  This appears to be consistent with nerve irritation pain.  She 

is already on high-dose medication.  I will give her 4 mg of morphine IM as well

 as 200 mg of Neurontin by mouth.  I have advised that she call the surgeon 

first thing in the morning and explained the situation so she can be seen sooner

 than next week.  Her incisions do not appear infected.  We will rewrap and 

splint the arm.  She is stable for discharge at this time.


[]





Dragon Disclaimer:


Dragon Disclaimer:


This electronic medical record was generated, in whole or in part, using a voice

 recognition dictation system.





Departure


Departure:


Impression:  


   Primary Impression:  


   Pain following surgery or procedure


   Additional Impression:  


   Radius and ulna distal fracture


   Qualified Codes:  S52.501A - Unspecified fracture of the lower end of right 

   radius, initial encounter for closed fracture; S52.601A - Unspecified 

   fracture of lower end of right ulna, initial encounter for closed fracture


Disposition:  01 HOME/RESIDENCE PRIOR TO ADM


Condition:  STABLE


Referrals:  


PB SAENZ MD (PCP)


Patient Instructions:  Pain, Neuropathic-Brief, Wrist Pain, Easy-to-Read





Justification of Admission:


Justification of Admission:


Justification of Admission Dx:  N/A











BESSIE MTZ DO                  Jul 9, 2020 04:44

## 2020-07-13 ENCOUNTER — HOSPITAL ENCOUNTER (OUTPATIENT)
Dept: HOSPITAL 63 - DXRAD | Age: 70
End: 2020-07-13
Attending: PHYSICIAN ASSISTANT
Payer: MEDICARE
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
## 2020-07-13 DIAGNOSIS — S52.91XA: Primary | ICD-10-CM
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
## 2020-07-13 PROCEDURE — 73110 X-RAY EXAM OF WRIST: CPT

## 2020-07-13 NOTE — RAD
Right wrist 3 views.

 

HISTORY: Post wrist fracture with surgery

 

3 views were taken of the right wrist. There is no recent study for 

comparison. There are plates on the distal radius and ulna. There is still

mild angulation of the ulnar fracture. There is a slight step-off on the 

AP view of the distal radius fracture. On the lateral view there are 

displaced fracture fragments dorsal to the radius, the exact etiology of 

the fracture is not identified. Posterior displacement of the head of the 

ulna is possible, displaced fragments off the radius possible. Correlation

with old studies would be of benefit. CT could be of benefit.

 

IMPRESSION:

1. Displaced fracture fragments on the lateral view. Additional imaging 

would be of benefit.

 

Electronically signed by: Edison Noriega MD (7/13/2020 5:32 PM) [id number]

## 2020-09-02 ENCOUNTER — HOSPITAL ENCOUNTER (EMERGENCY)
Dept: HOSPITAL 63 - ER | Age: 70
Discharge: HOME | End: 2020-09-02
Payer: MEDICARE

## 2020-09-02 VITALS — BODY MASS INDEX: 31.38 KG/M2 | HEIGHT: 60 IN | WEIGHT: 159.84 LBS

## 2020-09-02 VITALS — DIASTOLIC BLOOD PRESSURE: 92 MMHG | SYSTOLIC BLOOD PRESSURE: 161 MMHG

## 2020-09-02 DIAGNOSIS — E78.00: ICD-10-CM

## 2020-09-02 DIAGNOSIS — Z88.8: ICD-10-CM

## 2020-09-02 DIAGNOSIS — K21.9: ICD-10-CM

## 2020-09-02 DIAGNOSIS — J45.909: ICD-10-CM

## 2020-09-02 DIAGNOSIS — I25.10: ICD-10-CM

## 2020-09-02 DIAGNOSIS — I11.9: ICD-10-CM

## 2020-09-02 DIAGNOSIS — R51: ICD-10-CM

## 2020-09-02 DIAGNOSIS — Z87.891: ICD-10-CM

## 2020-09-02 DIAGNOSIS — F41.9: ICD-10-CM

## 2020-09-02 DIAGNOSIS — U07.1: Primary | ICD-10-CM

## 2020-09-02 DIAGNOSIS — B34.9: ICD-10-CM

## 2020-09-02 LAB
ALBUMIN SERPL-MCNC: 4 G/DL (ref 3.4–5)
ALBUMIN/GLOB SERPL: 1.1 {RATIO} (ref 1–1.7)
ALP SERPL-CCNC: 58 U/L (ref 46–116)
ALT SERPL-CCNC: 20 U/L (ref 14–59)
ANION GAP SERPL CALC-SCNC: 10 MMOL/L (ref 6–14)
APTT PPP: (no result) S
AST SERPL-CCNC: 23 U/L (ref 15–37)
BACTERIA #/AREA URNS HPF: (no result) /HPF
BASOPHILS # BLD AUTO: 0 X10^3/UL (ref 0–0.2)
BASOPHILS NFR BLD: 1 % (ref 0–3)
BILIRUB SERPL-MCNC: 0.4 MG/DL (ref 0.2–1)
BILIRUB UR QL STRIP: (no result)
BUN/CREAT SERPL: 11 (ref 6–20)
CA-I SERPL ISE-MCNC: 10 MG/DL (ref 7–20)
CALCIUM SERPL-MCNC: 8.9 MG/DL (ref 8.5–10.1)
CHLORIDE SERPL-SCNC: 100 MMOL/L (ref 98–107)
CO2 SERPL-SCNC: 25 MMOL/L (ref 21–32)
CREAT SERPL-MCNC: 0.9 MG/DL (ref 0.6–1)
EOSINOPHIL NFR BLD: 0.1 X10^3/UL (ref 0–0.7)
EOSINOPHIL NFR BLD: 3 % (ref 0–3)
ERYTHROCYTE [DISTWIDTH] IN BLOOD BY AUTOMATED COUNT: 13.9 % (ref 11.5–14.5)
FIBRINOGEN PPP-MCNC: CLEAR MG/DL
GFR SERPLBLD BASED ON 1.73 SQ M-ARVRAT: 62.5 ML/MIN
GLOBULIN SER-MCNC: 3.6 G/DL (ref 2.2–3.8)
GLUCOSE SERPL-MCNC: 92 MG/DL (ref 70–99)
GLUCOSE UR STRIP-MCNC: (no result) MG/DL
HCT VFR BLD CALC: 44.4 % (ref 36–47)
HGB BLD-MCNC: 14.7 G/DL (ref 12–15.5)
LYMPHOCYTES # BLD: 0.6 X10^3/UL (ref 1–4.8)
LYMPHOCYTES NFR BLD AUTO: 11 % (ref 24–48)
MCH RBC QN AUTO: 30 PG (ref 25–35)
MCHC RBC AUTO-ENTMCNC: 33 G/DL (ref 31–37)
MCV RBC AUTO: 91 FL (ref 79–100)
MONO #: 0.8 X10^3/UL (ref 0–1.1)
MONOCYTES NFR BLD: 15 % (ref 0–9)
NEUT #: 3.5 X10^3UL (ref 1.8–7.7)
NEUTROPHILS NFR BLD AUTO: 70 % (ref 31–73)
NITRITE UR QL STRIP: (no result)
PLATELET # BLD AUTO: 212 X10^3/UL (ref 140–400)
POTASSIUM SERPL-SCNC: 3.6 MMOL/L (ref 3.5–5.1)
PROT SERPL-MCNC: 7.6 G/DL (ref 6.4–8.2)
RBC # BLD AUTO: 4.89 X10^6/UL (ref 3.5–5.4)
RBC #/AREA URNS HPF: 0 /HPF (ref 0–2)
SODIUM SERPL-SCNC: 135 MMOL/L (ref 136–145)
SP GR UR STRIP: 1.02
UROBILINOGEN UR-MCNC: 0.2 MG/DL
WBC # BLD AUTO: 5 X10^3/UL (ref 4–11)
WBC #/AREA URNS HPF: (no result) /HPF (ref 0–4)

## 2020-09-02 PROCEDURE — 81001 URINALYSIS AUTO W/SCOPE: CPT

## 2020-09-02 PROCEDURE — 36415 COLL VENOUS BLD VENIPUNCTURE: CPT

## 2020-09-02 PROCEDURE — 71045 X-RAY EXAM CHEST 1 VIEW: CPT

## 2020-09-02 PROCEDURE — 99284 EMERGENCY DEPT VISIT MOD MDM: CPT

## 2020-09-02 PROCEDURE — 96375 TX/PRO/DX INJ NEW DRUG ADDON: CPT

## 2020-09-02 PROCEDURE — 96374 THER/PROPH/DIAG INJ IV PUSH: CPT

## 2020-09-02 PROCEDURE — 96361 HYDRATE IV INFUSION ADD-ON: CPT

## 2020-09-02 PROCEDURE — 85025 COMPLETE CBC W/AUTO DIFF WBC: CPT

## 2020-09-02 PROCEDURE — 80053 COMPREHEN METABOLIC PANEL: CPT

## 2020-09-02 NOTE — RAD
Exam: Chest one view

 

INDICATION: Shortness of breath

 

TECHNIQUE: Frontal view of the chest

 

Comparisons: 2/6/2020

 

FINDINGS:

The cardiomediastinal silhouette and pulmonary vessels are within normal 

limits.

 

Strandy opacities at the left lung base. No pleural effusion.

 

IMPRESSION:

Left basilar atelectasis.

 

Electronically signed by: Gretchen Lowery MD (9/2/2020 10:15 PM) [id number]

## 2020-09-02 NOTE — PHYS DOC
Past History


Past Medical History:  Anxiety, Asthma, CAD, COPD, GERD, High Cholesterol, Heart

Disease, Hypertension


Past Surgical History:  Tonsillectomy, Tubal ligation, Other


Additional Past Surgical Histo:  3 stents placed; esophageal stretch; fx left 

wrist repair, right RADIAL/ULN


Smoking:  Quit Greater Than 1 Year


Alcohol Use:  Occasionally


Drug Use:  None





General Adult


EDM:


Chief Complaint:  HEADACHE





HPI:


HPI:


69-year-old female presents with headache.  She has had a headache most of the 

day.  Is a global throbbing sensation.  She felt like she was feverish at home, 

but did not measure a fever.  She has had no persistent cough all day.  She has 

some mild generalized body aches and nausea.  No known COVID-19 exposures.





Review of Systems:


Review of Systems:


Constitutional: Fever


Eyes:  Denies change in visual acuity 


HENT:  Denies nasal congestion or sore throat 


Respiratory: Cough without shortness of breath


Cardiovascular:  Denies chest pain or edema 


GI:  Denies abdominal pain, nausea, vomiting, bloody stools or diarrhea 


: Denies dysuria 


Musculoskeletal:  Denies back pain or joint pain 


Integument:  Denies rash 


Neurologic: Headache.  Denies focal weakness or sensory changes 


Endocrine:  Denies polyuria or polydipsia 


Lymphatic:  Denies swollen glands 


Psychiatric:  Denies depression or anxiety





Heart Score:


Risk Factors:


Risk Factors:  DM, Current or recent (<one month) smoker, HTN, HLP, family 

history of CAD, obesity.


Risk Scores:


Score 0 - 3:  2.5% MACE over next 6 weeks - Discharge Home


Score 4 - 6:  20.3% MACE over next 6 weeks - Admit for Clinical Observation


Score 7 - 10:  72.7% MACE over next 6 weeks - Early Invasive Strategies





Current Medications:


Current Meds:





Current Medications








 Medications


  (Trade)  Dose


 Ordered  Sig/Mar  Start Time


 Stop Time Status Last Admin


Dose Admin


 


 Acetaminophen


  (Tylenol)  650 mg  1X  ONCE  9/2/20 21:15


 9/2/20 21:16 DC 9/2/20 21:32


650 MG


 


 Ondansetron HCl


  (Zofran)  4 mg  1X  ONCE  9/2/20 21:15


 9/2/20 21:16 DC 9/2/20 21:34


4 MG


 


 Sodium Chloride  1,000 ml @ 


 1,000 mls/hr  1X  ONCE  9/2/20 21:15


 9/2/20 22:14 DC 9/2/20 21:30


1,000 MLS/HR











Allergies:


Allergies:





Allergies








Coded Allergies Type Severity Reaction Last Updated Verified


 


  venom-honey bee Allergy Severe Shortness of Air 2/6/20 Yes


 


  adhesive Allergy Intermediate  2/6/20 Yes


 


  cimetidine Allergy Intermediate "toxic" 2/6/20 Yes


 


  cimetidine HCl Allergy Intermediate "toxic" 2/6/20 Yes











Physical Exam:


PE:





Constitutional: Well developed, well nourished, no acute distress, non-toxic 

appearance. []


HENT: Normocephalic, atraumatic, bilateral external ears normal, oropharynx 

moist, no oral exudates, nose normal. []


Eyes: PERRLA, EOMI, conjunctiva normal, no discharge. [] 


Neck: Normal range of motion, no tenderness, supple, no stridor. [] 


Cardiovascular: Heart rate regular rhythm, no murmur []


Lungs & Thorax:  Bilateral breath sounds diminished but clear to auscultation []


Abdomen: Bowel sounds normal, soft, no tenderness, no masses, no pulsatile 

masses. [] 


Skin: Warm, dry, no erythema, no rash. [] 


Back: No tenderness, no CVA tenderness. [] 


Extremities: No tenderness, no cyanosis, no clubbing, ROM intact, no edema. [] 


Neurologic: Alert and oriented X 3, normal motor function, normal sensory 

function, no focal deficits noted. []


Psychologic: Affect normal, judgement normal, mood normal. []





Current Patient Data:


Labs:





                                Laboratory Tests








Test


 9/2/20


21:30


 


White Blood Count


 5.0 x10^3/uL


(4.0-11.0)


 


Red Blood Count


 4.89 x10^6/uL


(3.50-5.40)


 


Hemoglobin


 14.7 g/dL


(12.0-15.5)


 


Hematocrit


 44.4 %


(36.0-47.0)


 


Mean Corpuscular Volume


 91 fL ()





 


Mean Corpuscular Hemoglobin 30 pg (25-35)  


 


Mean Corpuscular Hemoglobin


Concent 33 g/dL


(31-37)


 


Red Cell Distribution Width


 13.9 %


(11.5-14.5)


 


Platelet Count


 212 x10^3/uL


(140-400)


 


Neutrophils (%) (Auto) 70 % (31-73)  


 


Lymphocytes (%) (Auto) 11 % (24-48)  L


 


Monocytes (%) (Auto) 15 % (0-9)  H


 


Eosinophils (%) (Auto) 3 % (0-3)  


 


Basophils (%) (Auto) 1 % (0-3)  


 


Neutrophils # (Auto)


 3.5 x10^3uL


(1.8-7.7)


 


Lymphocytes # (Auto)


 0.6 x10^3/uL


(1.0-4.8)  L


 


Monocytes # (Auto)


 0.8 x10^3/uL


(0.0-1.1)


 


Eosinophils # (Auto)


 0.1 x10^3/uL


(0.0-0.7)


 


Basophils # (Auto)


 0.0 x10^3/uL


(0.0-0.2)


 


Sodium Level


 135 mmol/L


(136-145)  L


 


Potassium Level


 3.6 mmol/L


(3.5-5.1)


 


Chloride Level


 100 mmol/L


()


 


Carbon Dioxide Level


 25 mmol/L


(21-32)


 


Anion Gap 10 (6-14)  


 


Blood Urea Nitrogen


 10 mg/dL


(7-20)


 


Creatinine


 0.9 mg/dL


(0.6-1.0)


 


Estimated GFR


(Cockcroft-Gault) 62.5  





 


BUN/Creatinine Ratio 11 (6-20)  


 


Glucose Level


 92 mg/dL


(70-99)


 


Calcium Level


 8.9 mg/dL


(8.5-10.1)


 


Total Bilirubin


 0.4 mg/dL


(0.2-1.0)


 


Aspartate Amino Transferase


(AST) 23 U/L (15-37)





 


Alanine Aminotransferase (ALT)


 20 U/L (14-59)





 


Alkaline Phosphatase


 58 U/L


()


 


Total Protein


 7.6 g/dL


(6.4-8.2)


 


Albumin


 4.0 g/dL


(3.4-5.0)


 


Albumin/Globulin Ratio 1.1 (1.0-1.7)  








Vital Signs:





                                   Vital Signs








  Date Time  Temp Pulse Resp B/P (MAP) Pulse Ox O2 Delivery O2 Flow Rate FiO2


 


9/2/20 21:48  97 20 161/92 (115) 92 Room Air  


 


9/2/20 20:55 100.9       











EKG:


EKG:


[]





Radiology/Procedures:


Radiology/Procedures:


[]


Impressions:


Exam: Chest one view


 


INDICATION: Shortness of breath


 


TECHNIQUE: Frontal view of the chest


 


Comparisons: 2/6/2020


 


FINDINGS:


The cardiomediastinal silhouette and pulmonary vessels are within normal 


limits.


 


Strandy opacities at the left lung base. No pleural effusion.


 


IMPRESSION:


Left basilar atelectasis.


 


Electronically signed by: Gretchen Hoff MD (9/2/2020 10:15 PM) [id number]














DICTATED AND SIGNED BY:     GRETCHEN HOFF MD


DATE:     09/02/20 2215





CC: BESSIE MTZ DO; PB SAENZ MD ~





Course & Med Decision Making:


Course & Med Decision Making


Pertinent Labs and Imaging studies reviewed. (See chart for details)


On arrival, the patient had a fever of 100.9.  We gave her 650 of Tylenol.  Her 

chest x-ray shows some atelectasis, but no focal consolidation.  Her COVID-19 

swab is pending.  Her labs are unremarkable.  For her headache I have given her 

a liter normal saline, 15 mg of Toradol, 10 mg Reglan, 25 mg of Benadryl.  Her 

urinalysis is negative for infection.  This is likely a viral illness.  I have 

advised that she self isolate pending her COVID results.  Was encouraged to stay

 well-hydrated at home.  She is stable for discharge at this time.


[]





Dragon Disclaimer:


Dragon Disclaimer:


This electronic medical record was generated, in whole or in part, using a voice

 recognition dictation system.





Departure


Departure:


Impression:  


   Primary Impression:  


   Headache


   Additional Impression:  


   Viral syndrome


Disposition:  01 HOME/RESIDENCE PRIOR TO ADM


Condition:  STABLE


Referrals:  


PB SAENZ MD (PCP)


Patient Instructions:  Viral Syndrome


Scripts


Ondansetron (ONDANSETRON ODT) 4 Mg Tab.rapdis


1 TAB PO PRN Q6-8HRS PRN for VOMITING, #16 TAB


   Prov: BESSIE MTZ DO         9/2/20





Justification of Admission:


Justification of Admission:


Justification of Admission Dx:  N/A











BESSIE MTZ DO                  Sep 2, 2020 22:31

## 2020-09-04 ENCOUNTER — HOSPITAL ENCOUNTER (EMERGENCY)
Dept: HOSPITAL 63 - ER | Age: 70
Discharge: HOME | End: 2020-09-04
Payer: MEDICARE

## 2020-09-04 VITALS — BODY MASS INDEX: 31.38 KG/M2 | HEIGHT: 60 IN | WEIGHT: 159.84 LBS

## 2020-09-04 VITALS — DIASTOLIC BLOOD PRESSURE: 89 MMHG | SYSTOLIC BLOOD PRESSURE: 141 MMHG

## 2020-09-04 DIAGNOSIS — I11.9: ICD-10-CM

## 2020-09-04 DIAGNOSIS — E78.00: ICD-10-CM

## 2020-09-04 DIAGNOSIS — Z88.8: ICD-10-CM

## 2020-09-04 DIAGNOSIS — J32.9: ICD-10-CM

## 2020-09-04 DIAGNOSIS — I25.10: ICD-10-CM

## 2020-09-04 DIAGNOSIS — K21.9: ICD-10-CM

## 2020-09-04 DIAGNOSIS — J44.1: Primary | ICD-10-CM

## 2020-09-04 DIAGNOSIS — F41.9: ICD-10-CM

## 2020-09-04 DIAGNOSIS — Z87.891: ICD-10-CM

## 2020-09-04 DIAGNOSIS — Z91.030: ICD-10-CM

## 2020-09-04 LAB
ALBUMIN SERPL-MCNC: 3.8 G/DL (ref 3.4–5)
ALBUMIN/GLOB SERPL: 1 {RATIO} (ref 1–1.7)
ALP SERPL-CCNC: 53 U/L (ref 46–116)
ALT SERPL-CCNC: 26 U/L (ref 14–59)
ANION GAP SERPL CALC-SCNC: 10 MMOL/L (ref 6–14)
AST SERPL-CCNC: 28 U/L (ref 15–37)
BASOPHILS # BLD AUTO: 0 X10^3/UL (ref 0–0.2)
BASOPHILS NFR BLD: 1 % (ref 0–3)
BILIRUB SERPL-MCNC: 0.4 MG/DL (ref 0.2–1)
BUN/CREAT SERPL: 11 (ref 6–20)
CA-I SERPL ISE-MCNC: 9 MG/DL (ref 7–20)
CALCIUM SERPL-MCNC: 8.8 MG/DL (ref 8.5–10.1)
CHLORIDE SERPL-SCNC: 99 MMOL/L (ref 98–107)
CO2 SERPL-SCNC: 26 MMOL/L (ref 21–32)
CREAT SERPL-MCNC: 0.8 MG/DL (ref 0.6–1)
EOSINOPHIL NFR BLD: 0 % (ref 0–3)
EOSINOPHIL NFR BLD: 0 X10^3/UL (ref 0–0.7)
ERYTHROCYTE [DISTWIDTH] IN BLOOD BY AUTOMATED COUNT: 13.9 % (ref 11.5–14.5)
GFR SERPLBLD BASED ON 1.73 SQ M-ARVRAT: 71.5 ML/MIN
GLOBULIN SER-MCNC: 3.7 G/DL (ref 2.2–3.8)
GLUCOSE SERPL-MCNC: 104 MG/DL (ref 70–99)
HCT VFR BLD CALC: 47.6 % (ref 36–47)
HGB BLD-MCNC: 15.8 G/DL (ref 12–15.5)
LYMPHOCYTES # BLD: 1 X10^3/UL (ref 1–4.8)
LYMPHOCYTES NFR BLD AUTO: 29 % (ref 24–48)
MCH RBC QN AUTO: 30 PG (ref 25–35)
MCHC RBC AUTO-ENTMCNC: 33 G/DL (ref 31–37)
MCV RBC AUTO: 89 FL (ref 79–100)
MONO #: 0.5 X10^3/UL (ref 0–1.1)
MONOCYTES NFR BLD: 16 % (ref 0–9)
NEUT #: 1.8 X10^3UL (ref 1.8–7.7)
NEUTROPHILS NFR BLD AUTO: 54 % (ref 31–73)
PLATELET # BLD AUTO: 195 X10^3/UL (ref 140–400)
POTASSIUM SERPL-SCNC: 3.6 MMOL/L (ref 3.5–5.1)
PROT SERPL-MCNC: 7.5 G/DL (ref 6.4–8.2)
RBC # BLD AUTO: 5.33 X10^6/UL (ref 3.5–5.4)
SODIUM SERPL-SCNC: 135 MMOL/L (ref 136–145)
WBC # BLD AUTO: 3.4 X10^3/UL (ref 4–11)

## 2020-09-04 PROCEDURE — 80053 COMPREHEN METABOLIC PANEL: CPT

## 2020-09-04 PROCEDURE — 96375 TX/PRO/DX INJ NEW DRUG ADDON: CPT

## 2020-09-04 PROCEDURE — 85025 COMPLETE CBC W/AUTO DIFF WBC: CPT

## 2020-09-04 PROCEDURE — 36415 COLL VENOUS BLD VENIPUNCTURE: CPT

## 2020-09-04 PROCEDURE — 84484 ASSAY OF TROPONIN QUANT: CPT

## 2020-09-04 PROCEDURE — 83880 ASSAY OF NATRIURETIC PEPTIDE: CPT

## 2020-09-04 PROCEDURE — 99285 EMERGENCY DEPT VISIT HI MDM: CPT

## 2020-09-04 PROCEDURE — 71275 CT ANGIOGRAPHY CHEST: CPT

## 2020-09-04 PROCEDURE — 96374 THER/PROPH/DIAG INJ IV PUSH: CPT

## 2020-09-04 PROCEDURE — 93005 ELECTROCARDIOGRAM TRACING: CPT

## 2020-09-04 NOTE — RAD
EXAM: CT Pulmonary Angiogram

 

INDICATION: Reason: short of breath, productive cough, fever 100mls omni 

350 / Spl. Instructions:  / History: 

 

TECHNIQUE:  Multi-detector row images were acquired from the thoracic 

inlet through the upper abdomen with the use of IV contrast.  Sagittal and

coronal images were acquired from the transaxial data. MIP images of the 

pulmonary arteries were obtained. All CT scans performed at this facility 

utilize dose optimization techniques as appropriate to the exam, including

the following: Automated exposure control and adjustment of the mA and/or 

KV according to patient size (this includes techniques or standardized 

protocols for targeted exams where dose is indication/reason for exam).

 

IV CONTRAST: Administered

 

COMPARISON: Chest x-ray of 9/2/2020, CT pulmonary angiogram of 7/4/2018

 

FINDINGS: 

 

PULMONARY ARTERIES:  No pulmonary emboli are identified.

 

CARDIOVASCULAR:  Dense multivessel coronary calcifications. Normal heart 

size. Aorta is normal caliber.

 

MEDIASTINUM & NICKO: Extensive mediastinal adenopathy and bilateral hilar

adenopathy is present, more apparent in the interval. The largest lymph 

node is a right lower paratracheal node 1 cm in short axis and 1.7 cm in 

long axis.

 

LUNGS: No pulmonary infiltrate, nodule, or other focal abnormality. There 

is minimal mosaic attenuation to the superior left upper lobe, suggesting 

scattered areas of air-trapping.

 

PLEURAL SPACE: No pleural effusions or pneumothorax.

 

OSSEOUS & SOFT TISSUE: Unremarkable

 

ABDOMEN: Nodular fullness in the medial limb of the left adrenal gland is 

suggestive of a small adrenal adenoma. Low-density 1.3 cm exophytic lesion

from the superior pole right kidney is compatible with a cyst. This 

requires no additional imaging follow-up. Normal variant origin of the 

right hepatic artery from the abdominal aorta and an accessory left 

hepatic artery from the left gastric artery.

 

IMPRESSION:

 

No evidence of pulmonary emboli or other acute cardiopulmonary process.

Mild mediastinal adenopathy remains present

 

Electronically signed by: Carolynn Novak MD (9/4/2020 12:34 PM) 

[id number]

## 2020-09-04 NOTE — EKG
[facility] [address], [place] [address]

Test Date:    2020               Test Time:    10:07:47

Pat Name:     PARISH RICO      Department:   

Patient ID:   [id number]           Room:          

Gender:       F                        Technician:   

:          1950               Requested By: JO RAZA

Order Number: [id number]            Reading MD:     

                                 Measurements

Intervals                              Axis          

Rate:         70                       P:            49

NE:           152                      QRS:          -20

QRSD:         124                      T:            105

QT:           418                                    

QTc:          454                                    

                           Interpretive Statements

SINUS RHYTHM

LEFTWARD AXIS

LEFT BUNDLE BRANCH BLOCK

ABNORMAL ECG

RI6.02

Compared to ECG 2020 18:44:49

Left bundle-branch block now present

T-wave abnormality no longer present

## 2020-09-04 NOTE — PHYS DOC
Past History


Past Medical History:  Anxiety, Asthma, CAD, COPD, GERD, High Cholesterol, Heart

Disease, Hypertension


Past Surgical History:  Knee Replacement, Tonsillectomy, Tubal ligation, Other


Additional Past Surgical Histo:  3 stents placed; esophageal stretch; fx left 

wrist repair, right RADIAL/ULN


Smoking:  Quit Greater Than 1 Year


Alcohol Use:  Occasionally


Drug Use:  None





General Adult


EDM:


Chief Complaint:  SHORTNESS OF BREATH





HPI:


HPI:


68 yo F PMH CAD w/3 stents (on plavix), copd, asthma, chronic pain and cough, 

that started 2 days ago after pt was in a smokey bar.  Complains of troubles 

breathing with increased work of breathing, productive cough, headache "behind 

my eyes" and nasal congestion w/maxillary sinus pressure. States oxygen is 

helping her breath. Was dc'ed a few days ago from ed on z-pac and prednisone. D-

dimer was 0.44. Covid test pending. Pt recently had right wrist sx 2020. 

Does not take any estrogen products (not on estradiol). 





ROS: Denies associated fever, chills, nausea, vomiting, diarrhea, neck 

stiffness, sore throat, chest pressure or pain, hemoptysis, orthopnea, leg 

swelling, abdominal pain, diaphoresis, rash or neuro deficits.





Review of Systems:


Review of Systems:


Constitutional:  Denies fever or chills 


Eyes:  Denies change in visual acuity 


HENT:  Denies nasal congestion or sore throat 


Respiratory:  Denies cough or shortness of breath 


Cardiovascular:  Denies chest pain or edema 


GI:  Denies abdominal pain, nausea, vomiting, bloody stools or diarrhea 


: Denies dysuria 


Musculoskeletal:  Denies back pain or joint pain 


Integument:  Denies rash 


Neurologic:  Denies headache, focal weakness or sensory changes 


Endocrine:  Denies polyuria or polydipsia 


Lymphatic:  Denies swollen glands 


Psychiatric:  Denies depression or anxiety





Heart Score:


Risk Factors:


Risk Factors:  DM, Current or recent (<one month) smoker, HTN, HLP, family 

history of CAD, obesity.


Risk Scores:


Score 0 - 3:  2.5% MACE over next 6 weeks - Discharge Home


Score 4 - 6:  20.3% MACE over next 6 weeks - Admit for Clinical Observation


Score 7 - 10:  72.7% MACE over next 6 weeks - Early Invasive Strategies





Allergies:


Allergies:





Allergies








Coded Allergies Type Severity Reaction Last Updated Verified


 


  venom-honey bee Allergy Severe Shortness of Air 20 Yes


 


  adhesive Allergy Intermediate  20 Yes


 


  cimetidine Allergy Intermediate "toxic" 20 Yes


 


  cimetidine HCl Allergy Intermediate "toxic" 20 Yes











Physical Exam:


PE:





Constitutional: Well developed, well nourished, no acute distress, non-toxic 

appearance. []


HENT: Normocephalic, atraumatic, bilateral external ears normal, oropharynx 

moist, no oral exudates, 


Eyes: EOMI, conjunctiva normal, no discharge. [] 


Neck: Normal range of motion, no tenderness, supple, no stridor. [] 


Cardiovascular:Heart rate regular rhythm, no murmur []


Lungs & Thorax:  Bilateral breath sounds equal with slight expiratory wheezing, 

speaking in full sentences, no increased work of breathing, retractions or 

tachypnea


Abdomen: Bowel sounds normal, soft, no tenderness, no masses, no pulsatile 

masses. [] 


Skin: Warm, dry, no erythema, no rash. [] 


Back: No tenderness, no CVA tenderness. [] 


Extremities: No tenderness, no cyanosis, no clubbing, ROM intact, no edema. [] 


Neurologic: Alert and oriented X 3, normal motor function, normal sensory 

function, no focal deficits noted. []


Psychologic: Affect normal, judgement normal, mood normal. []





Current Patient Data:


Vital Signs:





                                   Vital Signs








  Date Time  Temp Pulse Resp B/P (MAP) Pulse Ox O2 Delivery O2 Flow Rate FiO2


 


20 10:02 98.6 74 18 156/101 (119) 95 Room Air  











EKG:


EKG:


Sinus rhythm at 70 bpm, no axis deviation, , LBBB, T wave inversion 1 and

 aVL, no ST elevations or ST depressions, no significant change from prior EKG 

2019





Radiology/Procedures:


Radiology/Procedures:


                                 IMAGING REPORT





                                     Signed





PATIENT: PARISH RICO LACCOUNT: [id number]     MRN#: [id number]


: 1950           LOCATION: ER              AGE: 69


SEX: F                    EXAM DT: 20         ACCESSION#: [id number]


STATUS: REG ER            ORD. PHYSICIAN: JO RAZA DO


REASON: short of breath, productive cough, fever 100mls omni 350


PROCEDURE: CT ANGIOGRAPHY CHEST





EXAM: CT Pulmonary Angiogram


 


INDICATION: Reason: short of breath, productive cough, fever 100mls omni 


350 / Spl. Instructions:  / History: 


 


TECHNIQUE:  Multi-detector row images were acquired from the thoracic 


inlet through the upper abdomen with the use of IV contrast.  Sagittal and


coronal images were acquired from the transaxial data. MIP images of the 


pulmonary arteries were obtained. All CT scans performed at this facility 


utilize dose optimization techniques as appropriate to the exam, including


the following: Automated exposure control and adjustment of the mA and/or 


KV according to patient size (this includes techniques or standardized 


protocols for targeted exams where dose is indication/reason for exam).


 


IV CONTRAST: Administered


 


COMPARISON: Chest x-ray of 2020, CT pulmonary angiogram of 2018


 


FINDINGS: 


 


PULMONARY ARTERIES:  No pulmonary emboli are identified.


 


CARDIOVASCULAR:  Dense multivessel coronary calcifications. Normal heart 


size. Aorta is normal caliber.


 


MEDIASTINUM & NICKO: Extensive mediastinal adenopathy and bilateral hilar


adenopathy is present, more apparent in the interval. The largest lymph 


node is a right lower paratracheal node 1 cm in short axis and 1.7 cm in 


long axis.


 


LUNGS: No pulmonary infiltrate, nodule, or other focal abnormality. There 


is minimal mosaic attenuation to the superior left upper lobe, suggesting 


scattered areas of air-trapping.


 


PLEURAL SPACE: No pleural effusions or pneumothorax.


 


OSSEOUS & SOFT TISSUE: Unremarkable


 


ABDOMEN: Nodular fullness in the medial limb of the left adrenal gland is 


suggestive of a small adrenal adenoma. Low-density 1.3 cm exophytic lesion


from the superior pole right kidney is compatible with a cyst. This 


requires no additional imaging follow-up. Normal variant origin of the 


right hepatic artery from the abdominal aorta and an accessory left 


hepatic artery from the left gastric artery.


 


IMPRESSION:


 


No evidence of pulmonary emboli or other acute cardiopulmonary process.


Mild mediastinal adenopathy remains present


 


Electronically signed by: Kris Novak MD (2020 12:34 PM) 


[id number]














DICTATED AND SIGNED BY:     KRIS NOVAK MD


DATE:     20 1234





CC: PB SAENZ MD; JO MCDONNELL DO ~





Course & Med Decision Making:


Course & Med Decision Making


Pertinent Labs and Imaging studies reviewed. (See chart for details)





Dyspnea for the past 4 days, requiring no oxygen supplementation.  Concern for 

sinusitis-we will have patient discontinue her azithromycin and start Augmentin.

  Due to recent surgical history, CTA was ordered, no pulmonary emboli, normal 

aorta, dense coronary artery calcifications.  Patient reports she follows 

outpatient with cardiology but is unsure if she has a pulmonologist.  Patient 

will be given outpt pulm referral.  Strict ED return cautions were given for 

increased work of breathing, hemoptysis, fatigue or worsening shortness of 

breath.  She is hemodynamically stable. Encouraged urgent outpatient follow-up 

with PMD and pulmonology.  Life-threatening processes were considered but are 

low suspicion at this time, given history and physical exam. Pt was educated on 

all prescription medications and adverse effects.  All patient's questions were 

answered and pt was stable at time of discharge.





Differential includes ACS, dysrhythmia, pneumothorax or hemothorax, pulmonary 

embolus, pneumonia, bronchoconstriction, pulmonary edema, angioedema, 

epiglottitis, tracheitis, Bishop's angina, RPA/PTA, anaphylaxis, angioedema, 

cardiac tamponade or murmurs, pericarditis, myocarditis, poisoning or toxicity, 

sepsis or autoimmune/neurologic disease.





I spoken with the patient and her caregivers.  I explained the patient's 

condition, diagnoses and treatment plan based on the information available to me

 at this time.  I have answered the patient and her caregiver's questions and 

addressed any concerns.  The patient and her caregivers have a good 

understanding of patient's diagnosis, condition and treatment plan as can be 

expected at this point.  Vital signs have been stable.  Patient's condition is 

stable and appropriate for discharge from the emergency department. 





Patient will pursue further outpatient evaluation with primary care physician or

 other designated or consulting physician as outlined in the discharge 

instructions.  The patient and/or caregivers are agreeable to this plan of care 

and follow-up instructions have been explained in detail.  The patient and/or 

caregivers have received these instructions in written form and have expressed 

an understanding of the discharge instructions.  The patient and/or caregivers 

are aware that any significant change of condition or worsening of symptoms 

should prompt immediate return to this or the closest emergency department or 

call to 911.





COVID-19 CRITERIA:    The patient was evaluated during the global COVID-19 

pandemic, and that diagnosis was suspected/considered upon their initial 

presentation.  Their evaluation, treatment and testing was consistent with 

current guidelines for patients who present with complaints or symptoms that may

 be related to COVID-19.








Pt was called 20 0930am - Pt was informed her covid result was positive.  

Patient states she feels a little winded but no signficant change from 

yesterday. Was speaking in full sentences on the phone with no labored speech.  

I gave patient strict ED return precautions for increased work of breathing, 

shortness of breath, sternal or subcostal retractions, chest pain or strokelike 

symptoms.  I also encourage patient to obtain a pulse oximeter and to return to 

ED if her level fall below 92%.  Patient was advised to quarantine for 14 days 

since symptom onset continue wearing a mask with hand hygiene directions.  All 

of patient's questions were answered.





Dragon Disclaimer:


Dragon Disclaimer:


This electronic medical record was generated, in whole or in part, using a voice

 recognition dictation system.





Departure


Departure:


Impression:  


   Primary Impression:  


   Dyspnea


   Additional Impressions:  


   COPD exacerbation


   Sinusitis


Disposition:  01 HOME/RESIDENCE PRIOR TO ADM


Condition:  STABLE


Referrals:  


PB SAENZ MD (PCP)


Patient Instructions:  Sinusitis


Scripts


Amoxicillin/Potassium Clav (AUGMENTIN 875-125 TABLET) 1 Each Tablet


1 TAB PO BID for sinusitis for 10 Days, #20 TAB 0 Refills


   Prov: JO RAZA DO         20





Justification of Admission:


Justification of Admission:


Justification of Admission Dx:  N/A











JO RAZA DO                Sep 4, 2020 10:22

## 2020-09-06 ENCOUNTER — HOSPITAL ENCOUNTER (EMERGENCY)
Dept: HOSPITAL 63 - ER | Age: 70
Discharge: HOME | End: 2020-09-06
Payer: MEDICARE

## 2020-09-06 VITALS
DIASTOLIC BLOOD PRESSURE: 65 MMHG | SYSTOLIC BLOOD PRESSURE: 136 MMHG | SYSTOLIC BLOOD PRESSURE: 136 MMHG | DIASTOLIC BLOOD PRESSURE: 65 MMHG

## 2020-09-06 VITALS — WEIGHT: 161.82 LBS | HEIGHT: 60 IN | BODY MASS INDEX: 31.77 KG/M2

## 2020-09-06 DIAGNOSIS — J44.9: ICD-10-CM

## 2020-09-06 DIAGNOSIS — I11.9: ICD-10-CM

## 2020-09-06 DIAGNOSIS — U07.1: Primary | ICD-10-CM

## 2020-09-06 DIAGNOSIS — F41.9: ICD-10-CM

## 2020-09-06 DIAGNOSIS — Z88.8: ICD-10-CM

## 2020-09-06 DIAGNOSIS — Z91.030: ICD-10-CM

## 2020-09-06 DIAGNOSIS — E78.00: ICD-10-CM

## 2020-09-06 DIAGNOSIS — K21.9: ICD-10-CM

## 2020-09-06 DIAGNOSIS — I25.10: ICD-10-CM

## 2020-09-06 DIAGNOSIS — Z87.891: ICD-10-CM

## 2020-09-06 DIAGNOSIS — R19.7: ICD-10-CM

## 2020-09-06 LAB
ALBUMIN SERPL-MCNC: 3.6 G/DL (ref 3.4–5)
ALBUMIN/GLOB SERPL: 1 {RATIO} (ref 1–1.7)
ALP SERPL-CCNC: 47 U/L (ref 46–116)
ALT SERPL-CCNC: 22 U/L (ref 14–59)
ANION GAP SERPL CALC-SCNC: 10 MMOL/L (ref 6–14)
AST SERPL-CCNC: 21 U/L (ref 15–37)
BILIRUB SERPL-MCNC: 0.4 MG/DL (ref 0.2–1)
BUN/CREAT SERPL: 14 (ref 6–20)
CA-I SERPL ISE-MCNC: 11 MG/DL (ref 7–20)
CALCIUM SERPL-MCNC: 8.6 MG/DL (ref 8.5–10.1)
CHLORIDE SERPL-SCNC: 101 MMOL/L (ref 98–107)
CO2 SERPL-SCNC: 25 MMOL/L (ref 21–32)
CREAT SERPL-MCNC: 0.8 MG/DL (ref 0.6–1)
GFR SERPLBLD BASED ON 1.73 SQ M-ARVRAT: 71.5 ML/MIN
GLOBULIN SER-MCNC: 3.5 G/DL (ref 2.2–3.8)
GLUCOSE SERPL-MCNC: 87 MG/DL (ref 70–99)
POTASSIUM SERPL-SCNC: 3.4 MMOL/L (ref 3.5–5.1)
PROT SERPL-MCNC: 7.1 G/DL (ref 6.4–8.2)
SODIUM SERPL-SCNC: 136 MMOL/L (ref 136–145)

## 2020-09-06 PROCEDURE — 36415 COLL VENOUS BLD VENIPUNCTURE: CPT

## 2020-09-06 PROCEDURE — 99285 EMERGENCY DEPT VISIT HI MDM: CPT

## 2020-09-06 PROCEDURE — 80053 COMPREHEN METABOLIC PANEL: CPT

## 2020-09-06 PROCEDURE — 96360 HYDRATION IV INFUSION INIT: CPT

## 2020-09-06 NOTE — PHYS DOC
Past History


Past Medical History:  Anxiety, Asthma, CAD, COPD, GERD, High Cholesterol, Heart

Disease, Hypertension


Past Surgical History:  Knee Replacement, Tonsillectomy, Tubal ligation, Other


Additional Past Surgical Histo:  3 stents placed; esophageal stretch; fx left 

wrist repair, right RADIAL/ULN


Smoking:  Quit Greater Than 1 Year


Alcohol Use:  Occasionally


Drug Use:  None





Adult General


Chief Complaint


Chief Complaint:  DIARRHEA





HPI


HPI





Patient is a 69-year-old female who presents for diarrhea.  Onset was 2 days 

ago.  Nothing known makes better or worse.  Patient reports generalized 

abdominal discomfort and watery, foul-smelling diarrhea.  She reports greater 

than 3 episodes daily for the past 48 hours.  Associated symptoms include URI-

like symptoms, mild shortness of breath, abdominal discomfort, nausea, and 

generalized malaise.  She reports not being able to eat food but is able to keep

down liquids at this time.  Patient denies any fever, syncope, 

dizziness/lightheadedness, falls, chest pain, blood loss of any kind.  Patient 

reports URI-like symptoms started approximately 1 week ago, she was seen and 

evaluated by a PCP 5 days ago and tested positive for COVID 19.  At that time, 

she was also given Augmentin for sinusitis infection.





Review of Systems


Review of Systems


Fourteen body systems of review of systems have been reviewed. See HPI for 

pertinent positives and negative responses, other wise all other systems are 

negative, non-pertinent or non-contributory





Current Medications


Current Medications





Current Medications








 Medications


  (Trade)  Dose


 Ordered  Sig/Mar  Start Time


 Stop Time Status Last Admin


Dose Admin


 


 Loperamide HCl


  (Imodium)  4 mg  1X  ONCE  9/6/20 22:15


 9/6/20 22:16 DC 9/6/20 22:15


4 MG


 


 Sodium Chloride  1,000 ml @ 


 1,000 mls/hr  1X  ONCE  9/6/20 21:30


 9/6/20 22:29 DC 9/6/20 21:30


1,000 MLS/HR











Allergies


Allergies





Allergies








Coded Allergies Type Severity Reaction Last Updated Verified


 


  venom-honey bee Allergy Severe Shortness of Air 9/4/20 Yes


 


  adhesive Allergy Intermediate  2/6/20 Yes


 


  cimetidine Allergy Intermediate "toxic" 9/4/20 Yes


 


  cimetidine HCl Allergy Intermediate "toxic" 9/4/20 Yes











Physical Exam


Physical Exam


Constitutional: Well developed, well nourished, no acute distress, non-toxic 

appearance.  Clinically dehydrated


HENT: Normocephalic, atraumatic, bilateral external ears normal, bilateral TMs 

without any findings of acute infection, postnasal drip present oropharynx dry, 

no oral exudates, external nose normal, engorged bilateral turbinates with clear

rhinorrhea present, no sinus tenderness to frontal or maxillary sinuses with 

palpation


Eyes: PERRLA, EOMI, conjunctiva normal, no discharge.  


Neck: Normal range of motion, no tenderness, supple, no stridor.  


Cardiovascular: Heart rate regular, sinus rhythm, no murmurs rubs or gallops


Lungs & Thorax:  Bilateral breath sounds clear to auscultation 


Abdomen: Bowel sounds normal, soft, no tenderness, no guarding, no rebound no 

masses, no pulsatile masses.  Nonsurgical abdomen, no peritoneal signs


Skin: Warm, dry, no erythema, no rash.  


Back: No tenderness, no CVA tenderness.  


Extremities: No tenderness, no cyanosis, no clubbing, ROM intact, no edema.  


Neurologic: Alert and oriented X 3, grossly normal motor & sensory function, no 

focal deficits noted. 


Psychologic: Affect normal, judgement normal, anxious mood





Current Patient Data


Lab Results





                                Laboratory Tests








Test


 9/6/20


21:43


 


Sodium Level


 136 mmol/L


(136-145)


 


Potassium Level


 3.4 mmol/L


(3.5-5.1)  L


 


Chloride Level


 101 mmol/L


()


 


Carbon Dioxide Level


 25 mmol/L


(21-32)


 


Anion Gap 10 (6-14)  


 


Blood Urea Nitrogen


 11 mg/dL


(7-20)


 


Creatinine


 0.8 mg/dL


(0.6-1.0)


 


Estimated GFR


(Cockcroft-Gault) 71.5  





 


BUN/Creatinine Ratio 14 (6-20)  


 


Glucose Level


 87 mg/dL


(70-99)


 


Calcium Level


 8.6 mg/dL


(8.5-10.1)


 


Total Bilirubin


 0.4 mg/dL


(0.2-1.0)


 


Aspartate Amino Transferase


(AST) 21 U/L (15-37)





 


Alanine Aminotransferase (ALT)


 22 U/L (14-59)





 


Alkaline Phosphatase


 47 U/L


()


 


Total Protein


 7.1 g/dL


(6.4-8.2)


 


Albumin


 3.6 g/dL


(3.4-5.0)


 


Albumin/Globulin Ratio 1.0 (1.0-1.7)  











EKG


EKG


[]





Radiology/Procedures


Radiology/Procedures


[]





Course & Med Decision Making


Course & Med Decision Making


Ambulatory overall well-appearing patient seen on ER arrival


ABCs grossly non-concerning, IV access obtained


Comprehensive history and physical exam obtained


2 L IV normal saline and 4 mg loperamide administered with moderate relief in 

symptomology


Discussed with patient given timeline of events, most likely diagnosis is diarr

hea related to active COVID-19 infection.  Patient rehydrated this visit, she 

has Zofran at home for as needed nausea, she is tolerating p.o. intake and 

stable for discharge


I cannot definitively exclude C. difficile infection in an elderly female with 

recent Augmentin use.  I have discontinued this medication as I do not think it 

is necessary and could potentially be contributing to patient's presenting 

symptoms.  


Patient's loose stool was sent for PCR for C. difficile infection and results 

will be called back to her.  Joint decision to take a wait and see approach to 

determine if C. difficile treatment is indicated


Ultimately, joint decision to discharge home with continued supportive care 

advised.  Strict return precautions were discussed with good understanding by 

patient, all questions and concerns addressed prior to ER departure in stable 

condition





Dragon Disclaimer


Dragon Disclaimer


This electronic medical record was generated, in whole or in part, using a voice

 recognition dictation system.





Departure


Departure:


Impression:  


   Primary Impression:  


   COVID-19


   Additional Impression:  


   Diarrhea


Disposition:  01 HOME/RESIDENCE PRIOR TO ADM


Condition:  STABLE


Referrals:  


PB SAENZ MD (PCP)


Patient Instructions:  Diarrhea, Diet for Diarrhea, Adult





Additional Instructions:  





Short


You were evaluated in the Emergency Department today for a cough. Your 

evaluation suggests a viral infection such as Coronavirus. It is important that 

you continue to self isolate and practice good hygiene at home.


Please follow up with your primary care physician as discussed.


Return to the Emergency Department if you experience worsening cough, fever, 

shortness of breath, recurrent vomiting, lethargy, or any other concerning 

symptoms.


Thank you for choosing us for your care.


Usted fue evaluado en el Departamento de Emergencia hoy por tos. Ken evaluacin 

sugiere jeb infeccin viral janet el coronavirus. Es importante que contine 

aislndose y practicando jeb buena higiene en el hogar.


Yanique un seguimiento con ken mdico de atencin primaria janet se discuti.


Regrese al Departamento de Emergencias si experimenta un empeoramiento de la 

tos, fiebre, falta de aliento, vmitos recurrentes, letargo o cualquier otro 

sntoma relacionado.


Judi por elegir nosotros para ken atencin.


Home Care Instructions for Patients with Mild Respiratory Infection


Most people with respiratory infections like colds, the flu, and Coronavirus 

Disease (COVID-19) will have mild illness and can get better with appropriate 

home care and without the need to see a provider. People who are elderly, 

pregnant, or have a weak immune system, or other medical problem are at higher 

risk of more serious illness or complications. It is recommended that they 

carefully monitor their symptoms closely and seek medical care early if their 

symptoms get worse.


TREATMENT AND MEDICAL CARE


Treatment


There is no specific treatment for most viruses including those that that cause 

the common cold and those that cause COVID-19. Sometimes there is treatment for 

the viruses that cause influenza if given early. Antibiotics treat infections 

caused by bacteria, but they do not work against viruses.Most people recover on 

their own from these viruses, including COVID-19. Here are steps that you can 

take to help you get better:


 Rest


 Drink plenty of fluids


 Take over-the-counter cold and flu medications to reduce fever and pain. 

Follow the instructions on the package, unless your doctor gave you 

instructions. Note that these medicines do not ``cure the illness and 

therefore do not stop you from spreading germs.


 Children should not be given medication that contains aspirin (acetylsalicylic

 acid) because it can cause a rare but serious illness called Reyes syndrome. 

Medicines without aspirin include acetaminophen (Tylenol) and ibuprofen 

(Advil, Motrin). Children younger than age 2 should not be given any 

over-the-counter cold medications without first speaking with a doctor.Seeking 

Medical Care


You should seek medical care if you are not getting better within a week, or if 

your symptoms get worse. If you are elderly, pregnant, have a weak immune 

system, or other medical problems, call your doctor right away.


It is best to call ahead of time to discuss your symptoms, if possible. This may

 allow you to receive the advice you need by phone. By avoiding a visit to a 

healthcare facility, you protect yourself from getting a new infection and 

protect others from catching an infection from you. If you do visit a healthcare

 facility, put on a mask to protect other patients and staff.


It is recommended that you seek medical care for serious symptoms, such as:


People with potentially life-threatening symptoms should call 911. If possible, 

put on a facemask before emergency medical services arrive.PROTECTING OTHERS


Follow the steps below to help prevent the disease from spreading to people in 

your home and community.Stay home when you are sick


 Stay home - do not go to work, school, or public areas.


 Stay home for at least 24 hours after your symptoms have gone away without the

 use of fever-reducing medicines.


 If you must leave home while you are sick, try to avoid using public 

transportation, ride-shares, and taxis. Wear a mask if possible.


Separate yourself from other people and animals in your home


 Stay in a specific room and away from other people in your home as much as pos

sible.


 Use a separate bathroom, if available.


 Try to stay at least 6 feet from others.


 Do not handle pets or other animals while you are sick.


Cover your coughs and sneezes


 Cover your mouth and nose with a tissue when you cough or sneeze. Throw used 

tissues in a lined trash can; immediately wash your hands.





Avoid sharing personal household items


 Do not share dishes, drinking glasses, cups, eating utensils, towels, or 

bedding with other people or pets in your home. Wash them thoroughly with soap a

nd water after use.





Clean your hands often


 Wash your hands often with soap and water for at least 20 seconds. If soap and

 water are not available, clean your hands with an alcohol-based hand  

that contains at least 60% alcohol, covering all surfaces of your hands and 

rubbing them together until they feel dry. Use soap and water if your hands are 

visibly dirty.





Clean all ``high-touch surfaces every day


 High touch surfaces include counters, tabletops, doorknobs, bathroom fixtures,

 toilets, phones, keyboards, tablets, and bedside tables. Also, clean any 

surfaces that may have body fluids on them. Use a household cleaning spray or 

wipe, according to the product label instructions.





COVID-19 (Novel Coronavirus) FAQs for Inquiring Patients


What do you do if you are worried that you have been exposed to COVID-19 but are

 without any symptoms?


If you develop symptoms that may indicate an infection, contact your physician. 

These include fever, cough, and shortness of breath.


Testing is not available for asymptomatic individuals, regardless of travel 

history.


To reduce the chance of getting sick use general infection prevention measures 

such as hand washing, covering your mouth and nose when you cough or sneeze and 

discarding any tissues carefully, and staying home when you are sick.Can 

exceptions be made for patients who are really worried and want to be tested?


Presently testing is only available through the [facility] Department of

 Public Health and Centers for Disease Control and Prevention. Only patients who

 meet the updated COVID-19 PUI definition may be tested. We do not control or 

set the PUI definition or evaluation criteria. We are unable to provide testing 

to patients who do not meet the strict criteria.


Should patients cancel or postpone an upcoming trip?


The decision about travel is personal and should be made in the context of a 

persons underlying health conditions, reason for travel and necessity of 

travel.


Travel insurance generally does not cover cancellations due to concerns of in

fectious disease outbreaks.


The Center for Disease Control has a section on travel notices. Situations are 

changing frequently and you should monitor the site for updates.


Should situations change rapidly in a foreign country while they are traveling, 

you could be subject to quarantine or restrictions upon return to the [place] 

[place]. It is best to have a plan on how to return urgently if needed during a 

trip abroad.


Because of how air circulates and is filtered on airplanes, most viruses do not 

spread easily on airplanes. CDC does not recommend use of facemasks during air 

travel.What other general precautions are advised?


Patients should be instructed to:


Avoid close contact with people who are sick.


Avoid touching your eyes, nose and mouth.


Stay home from work or school when they are sick. If you have a fever, you 

should remain home until 24 hours after fever resolves.


Clean and disinfect frequently touched objects and surfaces using a regular 

household cleaning spray or wipe.


Sneeze/cough into their elbow, not your hand.


Practice frequent hand hygiene with soap and water (at least 20 seconds) or 

alcohol-based hand rub.


Consider avoiding crowded places or mass gatherings, especially if you are 

immunocompromised or have chronic lung disease.


There is no evidence to support transmission of COVID-19 from goods imported 

from [place].


Are there any special precautions that are recommended if I am pregnant?


There is not yet any information available about the susceptibility of pregnant 

women to COVID-19. As a general rule, pregnant women may be more susceptible to 

viral respiratory infections and at risk for more severe illness.


The CDC guidance for COVID-19 and pregnancy has answers to questions about 

transmission during delivery, breastfeeding as well as other situations.





Should food, water, or medications be stockpiled? Should people telecommute?


The CDC has excellent information on this. Please visit the CDCs guidance for 

getting your household ready for COVID-19.





What should I do if I start feeling sick at work? And what should the workplace 

do for anyone exposed?


Anyone who is sick with a fever and cough should stay home from work until at 

least 24 hours after resolution of fever, regardless of concerns for COVID-19. 

It is still influenza (flu) season and influenza remains far more common.





Justification of Admission:


Justification of Admission:


Justification of Admission Dx:  N/A





Problem Qualifiers











ANDREINA CABELLO DO                  Sep 6, 2020 23:02

## 2020-09-21 ENCOUNTER — HOSPITAL ENCOUNTER (OUTPATIENT)
Dept: HOSPITAL 63 - RAD | Age: 70
Discharge: HOME | End: 2020-09-21
Attending: PHYSICIAN ASSISTANT
Payer: MEDICARE
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
## 2020-09-21 DIAGNOSIS — S52.321D: Primary | ICD-10-CM

## 2020-09-21 PROCEDURE — 73110 X-RAY EXAM OF WRIST: CPT

## 2020-09-21 NOTE — RAD
Examination: 3 views of the right wrist

 

HISTORY: History of right wrist pain

 

COMPARISON: 7/13/2020 

 

Findings/

impression:

 

 

Plate and screw fixation of the distal radius and ulna again identified. 

Severe joint space loss identified in the radiocarpal joint and first 

carpometacarpal joint.

 

Electronically signed by: Darrion Antonio MD (9/21/2020 3:55 PM) [id number]

## 2021-01-09 ENCOUNTER — HOSPITAL ENCOUNTER (EMERGENCY)
Dept: HOSPITAL 63 - ER | Age: 71
Discharge: HOME | End: 2021-01-09
Payer: MEDICARE

## 2021-01-09 VITALS — SYSTOLIC BLOOD PRESSURE: 153 MMHG | DIASTOLIC BLOOD PRESSURE: 67 MMHG

## 2021-01-09 VITALS — BODY MASS INDEX: 33.41 KG/M2 | WEIGHT: 170.2 LBS | HEIGHT: 60 IN

## 2021-01-09 DIAGNOSIS — E78.00: ICD-10-CM

## 2021-01-09 DIAGNOSIS — R30.0: ICD-10-CM

## 2021-01-09 DIAGNOSIS — Z87.891: ICD-10-CM

## 2021-01-09 DIAGNOSIS — F41.9: ICD-10-CM

## 2021-01-09 DIAGNOSIS — F32.9: ICD-10-CM

## 2021-01-09 DIAGNOSIS — I11.9: ICD-10-CM

## 2021-01-09 DIAGNOSIS — J44.9: ICD-10-CM

## 2021-01-09 DIAGNOSIS — K21.9: ICD-10-CM

## 2021-01-09 DIAGNOSIS — Z98.890: ICD-10-CM

## 2021-01-09 DIAGNOSIS — R19.7: Primary | ICD-10-CM

## 2021-01-09 DIAGNOSIS — Z90.89: ICD-10-CM

## 2021-01-09 DIAGNOSIS — Z98.51: ICD-10-CM

## 2021-01-09 DIAGNOSIS — Z88.8: ICD-10-CM

## 2021-01-09 DIAGNOSIS — Z91.030: ICD-10-CM

## 2021-01-09 LAB
APTT PPP: YELLOW S
BACTERIA #/AREA URNS HPF: 0 /HPF
BILIRUB UR QL STRIP: (no result)
FIBRINOGEN PPP-MCNC: CLEAR MG/DL
GLUCOSE UR STRIP-MCNC: (no result) MG/DL
NITRITE UR QL STRIP: (no result)
RBC #/AREA URNS HPF: (no result) /HPF (ref 0–2)
SP GR UR STRIP: 1.02
SQUAMOUS #/AREA URNS LPF: (no result) /LPF
UROBILINOGEN UR-MCNC: 0.2 MG/DL
WBC #/AREA URNS HPF: (no result) /HPF (ref 0–4)

## 2021-01-09 PROCEDURE — 99283 EMERGENCY DEPT VISIT LOW MDM: CPT

## 2021-01-09 PROCEDURE — 81001 URINALYSIS AUTO W/SCOPE: CPT

## 2021-01-09 PROCEDURE — 87086 URINE CULTURE/COLONY COUNT: CPT

## 2021-01-09 NOTE — PHYS DOC
Past History


Past Medical History:  Anxiety, Asthma, CAD, COPD, Depression, GERD, High 

Cholesterol, Hypertension, Other


Additional Past Medical Histor:  COVID-19


Past Surgical History:  Knee Replacement, Tonsillectomy, Tubal ligation, Other


Additional Past Surgical Histo:  DONALD knee repl; 3 card stents;esoph dil;lt wrist

repair; rt rad/uln open red


Smoking:  Quit Greater Than 1 Year


Alcohol Use:  Occasionally


Drug Use:  None





Adult General


Chief Complaint


Chief Complaint:  VAGINAL PROBLEM





HPI


HPI





Patient is a 70-year-old female who presents with a chief complaint of genital 

itching.  States that she went out on New Year's and drank too much alcohol whe

never she does that she has diarrhea for the next day or 2.  States that this 

did happen.  States that she was on the way to a well visit at her physicians 

when she had diarrhea/accident in the car on the way.  States that she sat in 

the diarrhea for approximately 20 to 30 minutes until she was able to get home 

and get cleaned off.  States that about 2 days later she began to have some 

itching down around her anus and genitals and then over the last 2 days has had 

some dysuria.  Denies any fevers, chest pain, shortness of breath, abdominal 

pain, nausea, vomiting, diarrhea has ceased, hematuria or blood in the stool.  

Denies any vaginal bleeding, discharge, internal vaginal itching.  States she 

otherwise feels well.





Review of Systems


Review of Systems


Review of systems otherwise unremarkable except for noted in HPI





Allergies


Allergies





Allergies








Coded Allergies Type Severity Reaction Last Updated Verified


 


  venom-honey bee Allergy Severe Shortness of Air 9/7/20 Yes


 


  adhesive Allergy Intermediate  9/7/20 Yes


 


  cimetidine Allergy Intermediate "toxic" 9/7/20 Yes


 


  cimetidine HCl Allergy Intermediate "toxic" 9/7/20 Yes











Physical Exam


Physical Exam





Constitutional: Well developed, well nourished, no acute distress, non-toxic 

appearance. []


HENT: Normocephalic, atraumatic, bilateral external ears normal, oropharynx 

moist, no oral exudates, nose normal. []


Eyes: PERRLA, EOMI, conjunctiva normal, no discharge. [] 


Cardiovascular:Heart rate regular rhythm, no murmur []


Lungs & Thorax:  Bilateral breath sounds clear to auscultation []


Abdomen: Bowel sounds normal, soft, no tenderness, no masses, no pulsatile 

masses. [] 


Skin: Patient has mild irritation/erythema on inner thighs around anus and just 

outside labia.  Does not appear to be fungal in nature, but more a contact 

dermatitis picture.


Back:  no CVA tenderness. [] 


Extremities: No tenderness, no cyanosis, no clubbing, ROM intact, no edema. [] 


Neurologic: Alert and oriented X 3, normal motor function, normal sensory 

function, no focal deficits noted. []


Psychologic: Affect normal, judgement normal, mood normal. []





Current Patient Data


Vital Signs





                                   Vital Signs








  Date Time  Temp Pulse Resp B/P (MAP) Pulse Ox O2 Delivery O2 Flow Rate FiO2


 


1/9/21 21:20 97.7 68 18 153/67 (95) 98 Room Air  











EKG


EKG


[]





Radiology/Procedures


Radiology/Procedures


[]





Heart Score


Risk Factors:


Risk Factors:  DM, Current or recent (<one month) smoker, HTN, HLP, family 

history of CAD, obesity.


Risk Scores:


Risk Factors:  DM, Current or recent (<one month) smoker, HTN, HLP, family h

istory of CAD, obesity.





Course & Med Decision Making


Course & Med Decision Making


Patient is a 70-year-old female who presents with dysuria and some itching in 

the urogenital region


Vital signs not concerning.  Physical exam noted above.  Urogenital exam with 

possible contact dermatitis irritation but not impressive.  Discussed with 

patient the need for keeping the area clean, dry and put barrier creams.





Discussed all findings with patient advised to follow-up with primary care 

physician first thing Monday morning to discuss ED visit and set up post ER 

follow-up visit.  Patient grateful, verbalized understanding and agreed with 

plan of discharge.


[]





Dragon Disclaimer


Dragon Disclaimer


This electronic medical record was generated, in whole or in part, using a voice

 recognition dictation system.





Departure


Departure:


Referrals:  


PB SAENZ MD (PCP)











CHOLO CABELLO MD                Jan 9, 2021 21:49

## 2021-01-24 ENCOUNTER — HOSPITAL ENCOUNTER (EMERGENCY)
Dept: HOSPITAL 63 - ER | Age: 71
Discharge: HOME | End: 2021-01-24
Payer: MEDICARE

## 2021-01-24 VITALS — BODY MASS INDEX: 34.84 KG/M2 | HEIGHT: 60 IN | WEIGHT: 177.47 LBS

## 2021-01-24 VITALS — SYSTOLIC BLOOD PRESSURE: 138 MMHG | DIASTOLIC BLOOD PRESSURE: 91 MMHG

## 2021-01-24 DIAGNOSIS — I10: ICD-10-CM

## 2021-01-24 DIAGNOSIS — Z87.891: ICD-10-CM
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
## 2021-01-24 DIAGNOSIS — S30.814A: Primary | ICD-10-CM
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
## 2021-01-24 DIAGNOSIS — F41.9: ICD-10-CM
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
## 2021-01-24 DIAGNOSIS — I25.10: ICD-10-CM
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
## 2021-01-24 DIAGNOSIS — Z91.030: ICD-10-CM

## 2021-01-24 DIAGNOSIS — J44.9: ICD-10-CM

## 2021-01-24 DIAGNOSIS — E78.00: ICD-10-CM

## 2021-01-24 DIAGNOSIS — K21.9: ICD-10-CM

## 2021-01-24 DIAGNOSIS — Z88.8: ICD-10-CM

## 2021-01-24 LAB
APTT PPP: YELLOW S
BACTERIA #/AREA URNS HPF: (no result) /HPF
BILIRUB UR QL STRIP: (no result)
FIBRINOGEN PPP-MCNC: CLEAR MG/DL
GLUCOSE UR STRIP-MCNC: (no result) MG/DL
NITRITE UR QL STRIP: (no result)
RBC #/AREA URNS HPF: (no result) /HPF (ref 0–2)
SP GR UR STRIP: 1.02
SQUAMOUS #/AREA URNS LPF: (no result) /LPF
UROBILINOGEN UR-MCNC: 0.2 MG/DL
WBC #/AREA URNS HPF: (no result) /HPF (ref 0–4)

## 2021-01-24 PROCEDURE — 81001 URINALYSIS AUTO W/SCOPE: CPT

## 2021-01-24 PROCEDURE — 99283 EMERGENCY DEPT VISIT LOW MDM: CPT

## 2021-01-24 PROCEDURE — 87086 URINE CULTURE/COLONY COUNT: CPT

## 2021-01-24 PROCEDURE — 87591 N.GONORRHOEAE DNA AMP PROB: CPT

## 2021-01-24 PROCEDURE — 36415 COLL VENOUS BLD VENIPUNCTURE: CPT

## 2021-01-24 PROCEDURE — 87491 CHLMYD TRACH DNA AMP PROBE: CPT

## 2021-01-24 NOTE — PHYS DOC
Past History


Past Medical History:  Anxiety, Asthma, CAD, COPD, Depression, GERD, High 

Cholesterol, Hypertension, Other


Additional Past Medical Histor:  COVID-19


 (JENY HENSON MD)


Past Surgical History:  Knee Replacement, Tonsillectomy, Tubal ligation, Other


Additional Past Surgical Histo:  DONALD knee repl; 3 card stents;esoph dil;lt wrist

repair; rt rad/uln open red


 (JENY HENSON MD)


Smoking:  Quit Greater Than 1 Year


Alcohol Use:  Occasionally


Drug Use:  None


 (JENY HENSON MD)





General Adult


EDM:


Chief Complaint:  VAGINAL PROBLEM





HPI:


HPI:





Patient is a 70-year-old female coming in for burning vaginal pain and burning 

with urination.  That she also has itching around her cranium.  Patient was 

sexually active last night and used flavored lubrication.  Patient states she 

used a condom.  Patient states she had to stop backwards because of the burning 

vaginal pain.  Patient has a history of hemorrhoids and rectal itching but this 

is different.  Denies any vaginal bleeding or discharge.  States she otherwise 

has been well


 (JENY HENSON MD)





Review of Systems:


Review of Systems:


All other systems within normal limits except for as noted in the HPI


 (JENY HENSON MD)





Allergies:


Allergies:





Allergies








Coded Allergies Type Severity Reaction Last Updated Verified


 


  venom-honey bee Allergy Severe Shortness of Air 1/24/21 Yes


 


  adhesive Allergy Intermediate  1/24/21 Yes


 


  cimetidine Allergy Intermediate "toxic" 1/24/21 Yes


 


  cimetidine HCl Allergy Intermediate "toxic" 1/24/21 Yes








 (JENY HENSON MD)





Physical Exam:


PE:


Constitutional: Well developed, well nourished, no acute distress, non-toxic 

appearance. []


HENT: Normocephalic, atraumatic, bilateral external ears normal,  nose normal. 

[]


Eyes: PERRLA, conjunctiva normal, no discharge. [] 


Neck: No rigidity, supple, no stridor. [] 


Cardiovascular: Regular rate and rhythm, brisk cap refill []


Lungs & Thorax: Non labored symmetric respirations, no tachypnea or respiratory 

distress []


Abdomen: Soft, nondistended.


Skin: Warm, dry, no erythema, no rash.  Superficial erosions of vaginal mucosa, 

no tears or lesions.  Slight erythematous rash near anus [] 


Back: Unremarkable


Extremities: No deformities, range of motion grossly intact, no lower extremity 

edema [] 


Neurologic: Alert and oriented X 3, no focal deficits noted. []


Psychologic: Affect normal, judgement normal, mood normal. []


 (JENY HENSON MD)





Current Patient Data:


Vital Signs:





                                   Vital Signs








  Date Time  Temp Pulse Resp B/P (MAP) Pulse Ox O2 Delivery O2 Flow Rate FiO2


 


1/24/21 16:50 98.2 75 18 137/62 (87) 98 Room Air  








 (JENY HENSON MD)





EKG:


EKG:


[]


 (JENY HENSON MD)





Radiology/Procedures:


Radiology/Procedures:


[]


 (JENY HENSON MD)





Heart Score:


Risk Factors:


Risk Factors:  DM, Current or recent (<one month) smoker, HTN, HLP, family 

history of CAD, obesity.


Risk Scores:


Score 0 - 3:  2.5% MACE over next 6 weeks - Discharge Home


Score 4 - 6:  20.3% MACE over next 6 weeks - Admit for Clinical Observation


Score 7 - 10:  72.7% MACE over next 6 weeks - Early Invasive Strategies


 (JENY HENSON MD)





Course & Med Decision Making:


Course & Med Decision Making


Pain likely due to abrasions, ordered benzocaine spray given patient 

instructions.  Pending vaginal swabs and urine at shift change.  Discussed plan 

with patient, transitioned care to Dr. Ward





[]


 (JENY HENSON MD)


Course & Med Decision Making


See Dr. Henson chart for details.   


Keflex 500 three times  a day.  After you complete the Keflex take Diflucan 100 

mg daily for 3 days.  Try the 2% lidocaine gel or ointment to the area of 

abrasion up to 4 times a day.  Follow-up cultures.  Follow-up primary care.  

Return if any concerns.





Impression:





1.  UTI/dysuria


2.  Abrasion 


 (JESÚS WARD MD)


Dragon Disclaimer:


Dragon Disclaimer:


This electronic medical record was generated, in whole or in part, using a voice

 recognition dictation system.


 (JENY HENSON MD)





Departure


Departure:


Referrals:  


PB SAENZ MD (PCP)


Scripts


Fluconazole (DIFLUCAN) 100 Mg Tablet


100 MG PO DAILY for POST ANTIBIOTIC for 3 Days, #3 TAB


   Prov: JESÚS WARD MD         1/24/21 


Cephalexin (KEFLEX) 750 Mg Capsule


500 MG PO TID for UTI for 10 Days, #20 CAP


   Prov: JESÚS WARD MD         1/24/21 


Mupirocin/Lidocaine (Lidocaine 2%-Mupirocin 2% Oint) 30 Gm Oint...g.


30 GM TP apply to abrasion  for ABRASION, #1 MISC


   Prov: JESÚS WARD MD         1/24/21





Dragon Disclaimer


This chart was dictated in whole or in part using Voice Recognition software in 

a busy, high-work load, and often noisy Emergency Department environment.  It 

may contain unintended and wholly unrecognized errors or omissions.


 (JESÚS WARD MD)











JENY HENSON MD              Jan 24, 2021 17:42


JESÚS WARD MD           Jan 24, 2021 20:24

## 2021-02-18 ENCOUNTER — HOSPITAL ENCOUNTER (EMERGENCY)
Dept: HOSPITAL 63 - ER | Age: 71
Discharge: HOME | End: 2021-02-18
Payer: MEDICARE

## 2021-02-18 VITALS — SYSTOLIC BLOOD PRESSURE: 148 MMHG | DIASTOLIC BLOOD PRESSURE: 76 MMHG

## 2021-02-18 VITALS — HEIGHT: 60 IN | BODY MASS INDEX: 30.3 KG/M2 | WEIGHT: 154.32 LBS

## 2021-02-18 DIAGNOSIS — Z98.890: ICD-10-CM

## 2021-02-18 DIAGNOSIS — Z88.6: ICD-10-CM

## 2021-02-18 DIAGNOSIS — Z87.891: ICD-10-CM

## 2021-02-18 DIAGNOSIS — I10: ICD-10-CM

## 2021-02-18 DIAGNOSIS — E78.00: ICD-10-CM

## 2021-02-18 DIAGNOSIS — Z98.51: ICD-10-CM

## 2021-02-18 DIAGNOSIS — Z91.030: ICD-10-CM

## 2021-02-18 DIAGNOSIS — Z88.8: ICD-10-CM

## 2021-02-18 DIAGNOSIS — J44.1: Primary | ICD-10-CM

## 2021-02-18 DIAGNOSIS — F41.9: ICD-10-CM

## 2021-02-18 DIAGNOSIS — F32.9: ICD-10-CM

## 2021-02-18 DIAGNOSIS — K21.9: ICD-10-CM

## 2021-02-18 LAB
ALBUMIN SERPL-MCNC: 3.7 G/DL (ref 3.4–5)
ALBUMIN/GLOB SERPL: 1.1 {RATIO} (ref 1–1.7)
ALP SERPL-CCNC: 55 U/L (ref 46–116)
ALT SERPL-CCNC: 31 U/L (ref 14–59)
ANION GAP SERPL CALC-SCNC: 11 MMOL/L (ref 6–14)
AST SERPL-CCNC: 25 U/L (ref 15–37)
BASOPHILS # BLD AUTO: 0.1 X10^3/UL (ref 0–0.2)
BASOPHILS NFR BLD: 1 % (ref 0–3)
BILIRUB SERPL-MCNC: 0.6 MG/DL (ref 0.2–1)
BUN/CREAT SERPL: 13 (ref 6–20)
CA-I SERPL ISE-MCNC: 10 MG/DL (ref 7–20)
CALCIUM SERPL-MCNC: 9 MG/DL (ref 8.5–10.1)
CHLORIDE SERPL-SCNC: 104 MMOL/L (ref 98–107)
CO2 SERPL-SCNC: 24 MMOL/L (ref 21–32)
CREAT SERPL-MCNC: 0.8 MG/DL (ref 0.6–1)
EOSINOPHIL NFR BLD: 0.3 X10^3/UL (ref 0–0.7)
EOSINOPHIL NFR BLD: 4 % (ref 0–3)
ERYTHROCYTE [DISTWIDTH] IN BLOOD BY AUTOMATED COUNT: 14.7 % (ref 11.5–14.5)
GFR SERPLBLD BASED ON 1.73 SQ M-ARVRAT: 71.3 ML/MIN
GLOBULIN SER-MCNC: 3.4 G/DL (ref 2.2–3.8)
GLUCOSE SERPL-MCNC: 98 MG/DL (ref 70–99)
HCT VFR BLD CALC: 44.4 % (ref 36–47)
HGB BLD-MCNC: 14.6 G/DL (ref 12–15.5)
LYMPHOCYTES # BLD: 1.3 X10^3/UL (ref 1–4.8)
LYMPHOCYTES NFR BLD AUTO: 19 % (ref 24–48)
MCH RBC QN AUTO: 29 PG (ref 25–35)
MCHC RBC AUTO-ENTMCNC: 33 G/DL (ref 31–37)
MCV RBC AUTO: 89 FL (ref 79–100)
MONO #: 0.4 X10^3/UL (ref 0–1.1)
MONOCYTES NFR BLD: 6 % (ref 0–9)
NEUT #: 4.8 X10^3UL (ref 1.8–7.7)
NEUTROPHILS NFR BLD AUTO: 70 % (ref 31–73)
PLATELET # BLD AUTO: 238 X10^3/UL (ref 140–400)
POTASSIUM SERPL-SCNC: 3.7 MMOL/L (ref 3.5–5.1)
PROT SERPL-MCNC: 7.1 G/DL (ref 6.4–8.2)
RBC # BLD AUTO: 4.98 X10^6/UL (ref 3.5–5.4)
SODIUM SERPL-SCNC: 139 MMOL/L (ref 136–145)
WBC # BLD AUTO: 6.8 X10^3/UL (ref 4–11)

## 2021-02-18 PROCEDURE — 85025 COMPLETE CBC W/AUTO DIFF WBC: CPT

## 2021-02-18 PROCEDURE — 36415 COLL VENOUS BLD VENIPUNCTURE: CPT

## 2021-02-18 PROCEDURE — 96361 HYDRATE IV INFUSION ADD-ON: CPT

## 2021-02-18 PROCEDURE — 82803 BLOOD GASES ANY COMBINATION: CPT

## 2021-02-18 PROCEDURE — 93005 ELECTROCARDIOGRAM TRACING: CPT

## 2021-02-18 PROCEDURE — 94640 AIRWAY INHALATION TREATMENT: CPT

## 2021-02-18 PROCEDURE — 83880 ASSAY OF NATRIURETIC PEPTIDE: CPT

## 2021-02-18 PROCEDURE — 99284 EMERGENCY DEPT VISIT MOD MDM: CPT

## 2021-02-18 PROCEDURE — 80053 COMPREHEN METABOLIC PANEL: CPT

## 2021-02-18 PROCEDURE — 84484 ASSAY OF TROPONIN QUANT: CPT

## 2021-02-18 PROCEDURE — 96374 THER/PROPH/DIAG INJ IV PUSH: CPT

## 2021-02-18 NOTE — PHYS DOC
Past History


Past Medical History:  Anxiety, Asthma, CAD, COPD, Depression, GERD, High 

Cholesterol, Hypertension, Other


Additional Past Medical Histor:  COVID-19


Past Surgical History:  Knee Replacement, Tonsillectomy, Tubal ligation, Other


Additional Past Surgical Histo:  DONALD knee repl; 3 card stents;esoph dil;lt wrist

repair; rt rad/uln open red


Smoking:  Quit Greater Than 1 Year


Alcohol Use:  Occasionally


Drug Use:  None





General Adult


EDM:


Chief Complaint:  SHORTNESS OF BREATH





HPI:


HPI:





Patient is a 70-year-old female brought in by EMS for COPD exacerbation.  

Patient states that since this morning she has been having a difficult time 

catching her breath.  Wheezing inhaler without improvement.  Has a nebulizer at 

home but was unable to find it.  Says she really has flareups.  Does not have 

any cough but has had some congestion.  Patient states that yesterday she spent 

some time outside in the cold air last night and was cleaning yesterday during 

the day, she thinks her trigger was a combination of both of those.  States she 

otherwise been well and has had no fevers, vomiting, diarrhea, change in 

urination.  Denies any chest pain or pressure.





Review of Systems:


Review of Systems:


All other systems within normal limits except for as noted in the HPI





Current Medications:


Current Meds:





Current Medications








 Medications


  (Trade)  Dose


 Ordered  Sig/Mar  Start Time


 Stop Time Status Last Admin


Dose Admin


 


 Albuterol/


 Ipratropium


  (Duoneb)  3 ml  1X  ONCE  2/18/21 16:15


 2/18/21 16:35 DC  





 


 Methylprednisolone


 Sodium Succinate


  (SOLU-Medrol


 125MG VIAL)  125 mg  1X  ONCE  2/18/21 16:15


 2/18/21 16:35 DC  





 


 Sodium Chloride  500 ml @ 0


 mls/hr  1X  ONCE  2/18/21 16:15


 2/18/21 16:35 DC  














Allergies:


Allergies:





Allergies








Coded Allergies Type Severity Reaction Last Updated Verified


 


  venom-honey bee Allergy Severe Shortness of Air 1/24/21 Yes


 


  adhesive Allergy Intermediate  1/24/21 Yes


 


  cimetidine Allergy Intermediate "toxic" 1/24/21 Yes


 


  cimetidine HCl Allergy Intermediate "toxic" 1/24/21 Yes











Physical Exam:


PE:


Constitutional: Well developed, well nourished, no acute distress, non-toxic 

appearance. []


HENT: Normocephalic, atraumatic, bilateral external ears normal,  nose normal. 

[]


Eyes: PERRLA, conjunctiva normal, no discharge. [] 


Neck: No rigidity, supple, no stridor. [] 


Cardiovascular: Regular rate and rhythm, brisk cap refill []


Lungs & Thorax: Non labored symmetric respirations, no tachypnea or respiratory 

distress, bilateral diminished breath sounds, no wheezing, rhonchi or rales []


Abdomen: Soft, nondistended.


Skin: Warm, dry, no erythema, no rash. [] 


Back: Unremarkable


Extremities: No deformities, range of motion grossly intact, no lower extremity 

edema [] 


Neurologic: Alert and oriented X 3, no focal deficits noted. []


Psychologic: Affect normal, judgement normal, mood normal. []





EKG:


EKG:


Normal sinus rhythm, left axis deviation, left bundle branch block, no ST 

elevation depression, no ectopy.  []





Radiology/Procedures:


Radiology/Procedures:


[]





Heart Score:


Risk Factors:


Risk Factors:  DM, Current or recent (<one month) smoker, HTN, HLP, family 

history of CAD, obesity.


Risk Scores:


Score 0 - 3:  2.5% MACE over next 6 weeks - Discharge Home


Score 4 - 6:  20.3% MACE over next 6 weeks - Admit for Clinical Observation


Score 7 - 10:  72.7% MACE over next 6 weeks - Early Invasive Strategies





Course & Med Decision Making:


Course & Med Decision Making


Pertinent Labs and Imaging studies reviewed. (See chart for details)





[]





Dragon Disclaimer:


Dragon Disclaimer:


This electronic medical record was generated, in whole or in part, using a voice

 recognition dictation system.





Departure


Departure:


Impression:  


   Primary Impression:  


   COPD exacerbation


Disposition:  01 DC HOME SELF CARE/HOMELESS


Condition:  IMPROVED


Referrals:  


PB SAENZ MD (PCP)


Patient Instructions:  Nebulizer, Using a


Scripts


Ipratropium/Albuterol Sulfate (DUONEB 0.5-3(2.5) MG/3 ML) 3 Ml Ampul.neb


3 ML NEB QID PRN for SHORTNESS OF BREATH for 10 Days, #40 EACH


   Prov: JENY HENSON MD         2/18/21 


Prednisone (PREDNISONE) 50 Mg Tablet


1 TAB PO DAILY for steroid, #4 TAB


   You received this medication in the emergency room today.


   You will starting your next dose tomorrow.


   Prov: JENY HENSON MD         2/18/21











JENY HENSON MD              Feb 18, 2021 16:43

## 2021-02-18 NOTE — EKG
[facility] [address], [place] [address]

Test Date:    2021               Test Time:    16:14:15

Pat Name:     PARISH RICO      Department:   

Patient ID:   [id number]           Room:          

Gender:       F                        Technician:   ERICA

:          1950               Requested By: JENY HENSON

Order Number: [id number]            Reading MD:     

                                 Measurements

Intervals                              Axis          

Rate:         62                       P:            48

NC:           162                      QRS:          -3

QRSD:         128                      T:            116

QT:           456                                    

QTc:          465                                    

                           Interpretive Statements

SINUS RHYTHM

LEFTWARD AXIS

LEFT BUNDLE BRANCH BLOCK

ABNORMAL ECG

RI6.02

No previous ECG available for comparison

## 2021-02-28 ENCOUNTER — HOSPITAL ENCOUNTER (EMERGENCY)
Dept: HOSPITAL 63 - ER | Age: 71
Discharge: HOME | End: 2021-02-28
Payer: MEDICARE

## 2021-02-28 VITALS — SYSTOLIC BLOOD PRESSURE: 124 MMHG | DIASTOLIC BLOOD PRESSURE: 89 MMHG

## 2021-02-28 VITALS — HEIGHT: 60 IN | WEIGHT: 176.37 LBS | BODY MASS INDEX: 34.63 KG/M2

## 2021-02-28 DIAGNOSIS — Z87.891: ICD-10-CM

## 2021-02-28 DIAGNOSIS — R07.89: Primary | ICD-10-CM

## 2021-02-28 DIAGNOSIS — Z91.030: ICD-10-CM

## 2021-02-28 DIAGNOSIS — Z88.8: ICD-10-CM

## 2021-02-28 DIAGNOSIS — J44.9: ICD-10-CM

## 2021-02-28 DIAGNOSIS — Z86.16: ICD-10-CM

## 2021-02-28 LAB
ALBUMIN SERPL-MCNC: 3.4 G/DL (ref 3.4–5)
ALBUMIN/GLOB SERPL: 1 {RATIO} (ref 1–1.7)
ALP SERPL-CCNC: 55 U/L (ref 46–116)
ALT SERPL-CCNC: 35 U/L (ref 14–59)
ANION GAP SERPL CALC-SCNC: 12 MMOL/L (ref 6–14)
AST SERPL-CCNC: 28 U/L (ref 15–37)
BASOPHILS # BLD AUTO: 0.1 X10^3/UL (ref 0–0.2)
BASOPHILS NFR BLD: 1 % (ref 0–3)
BILIRUB SERPL-MCNC: 0.6 MG/DL (ref 0.2–1)
BUN/CREAT SERPL: 15 (ref 6–20)
CA-I SERPL ISE-MCNC: 12 MG/DL (ref 7–20)
CALCIUM SERPL-MCNC: 8.8 MG/DL (ref 8.5–10.1)
CHLORIDE SERPL-SCNC: 105 MMOL/L (ref 98–107)
CO2 SERPL-SCNC: 25 MMOL/L (ref 21–32)
CREAT SERPL-MCNC: 0.8 MG/DL (ref 0.6–1)
EOSINOPHIL NFR BLD: 0.6 X10^3/UL (ref 0–0.7)
EOSINOPHIL NFR BLD: 10 % (ref 0–3)
ERYTHROCYTE [DISTWIDTH] IN BLOOD BY AUTOMATED COUNT: 14.8 % (ref 11.5–14.5)
GFR SERPLBLD BASED ON 1.73 SQ M-ARVRAT: 71.3 ML/MIN
GLOBULIN SER-MCNC: 3.3 G/DL (ref 2.2–3.8)
GLUCOSE SERPL-MCNC: 109 MG/DL (ref 70–99)
HCT VFR BLD CALC: 41.5 % (ref 36–47)
HGB BLD-MCNC: 13.7 G/DL (ref 12–15.5)
LYMPHOCYTES # BLD: 1.9 X10^3/UL (ref 1–4.8)
LYMPHOCYTES NFR BLD AUTO: 34 % (ref 24–48)
MAGNESIUM SERPL-MCNC: 1.9 MG/DL (ref 1.8–2.4)
MCH RBC QN AUTO: 29 PG (ref 25–35)
MCHC RBC AUTO-ENTMCNC: 33 G/DL (ref 31–37)
MCV RBC AUTO: 89 FL (ref 79–100)
MONO #: 0.6 X10^3/UL (ref 0–1.1)
MONOCYTES NFR BLD: 10 % (ref 0–9)
NEUT #: 2.6 X10^3UL (ref 1.8–7.7)
NEUTROPHILS NFR BLD AUTO: 45 % (ref 31–73)
PLATELET # BLD AUTO: 242 X10^3/UL (ref 140–400)
POTASSIUM SERPL-SCNC: 3.9 MMOL/L (ref 3.5–5.1)
PROT SERPL-MCNC: 6.7 G/DL (ref 6.4–8.2)
RBC # BLD AUTO: 4.67 X10^6/UL (ref 3.5–5.4)
SODIUM SERPL-SCNC: 142 MMOL/L (ref 136–145)
WBC # BLD AUTO: 5.8 X10^3/UL (ref 4–11)

## 2021-02-28 PROCEDURE — 84484 ASSAY OF TROPONIN QUANT: CPT

## 2021-02-28 PROCEDURE — 85730 THROMBOPLASTIN TIME PARTIAL: CPT

## 2021-02-28 PROCEDURE — 83735 ASSAY OF MAGNESIUM: CPT

## 2021-02-28 PROCEDURE — 93005 ELECTROCARDIOGRAM TRACING: CPT

## 2021-02-28 PROCEDURE — 71045 X-RAY EXAM CHEST 1 VIEW: CPT

## 2021-02-28 PROCEDURE — 99285 EMERGENCY DEPT VISIT HI MDM: CPT

## 2021-02-28 PROCEDURE — 83880 ASSAY OF NATRIURETIC PEPTIDE: CPT

## 2021-02-28 PROCEDURE — 96374 THER/PROPH/DIAG INJ IV PUSH: CPT

## 2021-02-28 PROCEDURE — 80053 COMPREHEN METABOLIC PANEL: CPT

## 2021-02-28 PROCEDURE — 85379 FIBRIN DEGRADATION QUANT: CPT

## 2021-02-28 PROCEDURE — 36415 COLL VENOUS BLD VENIPUNCTURE: CPT

## 2021-02-28 PROCEDURE — 82553 CREATINE MB FRACTION: CPT

## 2021-02-28 PROCEDURE — 85610 PROTHROMBIN TIME: CPT

## 2021-02-28 PROCEDURE — 85025 COMPLETE CBC W/AUTO DIFF WBC: CPT

## 2021-02-28 NOTE — RAD
Chest AP portable 2/28/2021.



Reason for exam: Chest pain.



Comparison is made with a study of 9/2/2020.



No infiltrate or effusion is seen. Heart size and pulmonary vascularity appear normal.



IMPRESSION: No acute abnormality.



Electronically signed by: John Fregoso Jr., MD (2/28/2021 12:49 PM) [facility]

## 2021-02-28 NOTE — EKG
[facility] [address], [place] [address]

Test Date:    2021               Test Time:    12:16:30

Pat Name:     PARISH RICO      Department:   

Patient ID:   [id number]           Room:          

Gender:       F                        Technician:   

:          1950               Requested By: SANJAY BRASHER

Order Number: [id number]            Reading MD:     

                                 Measurements

Intervals                              Axis          

Rate:         65                       P:            52

AK:           168                      QRS:          -15

QRSD:         126                      T:            72

QT:           454                                    

QTc:          473                                    

                           Interpretive Statements

SINUS RHYTHM

LEFTWARD AXIS

LEFT BUNDLE BRANCH BLOCK

ABNORMAL ECG

RI6.02

Compared to ECG 2021 12:15:23

No significant changes

## 2021-04-10 ENCOUNTER — HOSPITAL ENCOUNTER (EMERGENCY)
Dept: HOSPITAL 63 - ER | Age: 71
Discharge: HOME | End: 2021-04-10
Payer: MEDICARE

## 2021-04-10 VITALS
HEIGHT: 60 IN | BODY MASS INDEX: 34.63 KG/M2 | WEIGHT: 176.37 LBS | SYSTOLIC BLOOD PRESSURE: 142 MMHG | DIASTOLIC BLOOD PRESSURE: 89 MMHG

## 2021-04-10 DIAGNOSIS — K21.9: ICD-10-CM

## 2021-04-10 DIAGNOSIS — M25.512: ICD-10-CM
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
## 2021-04-10 DIAGNOSIS — S29.012A: Primary | ICD-10-CM

## 2021-04-10 DIAGNOSIS — Z87.891: ICD-10-CM

## 2021-04-10 DIAGNOSIS — Z88.8: ICD-10-CM

## 2021-04-10 DIAGNOSIS — J44.9: ICD-10-CM
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
## 2021-04-10 DIAGNOSIS — I10: ICD-10-CM

## 2021-04-10 DIAGNOSIS — Z91.030: ICD-10-CM
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
## 2021-04-10 DIAGNOSIS — F41.9: ICD-10-CM

## 2021-04-10 PROCEDURE — 96372 THER/PROPH/DIAG INJ SC/IM: CPT

## 2021-04-10 PROCEDURE — 99283 EMERGENCY DEPT VISIT LOW MDM: CPT

## 2021-04-10 PROCEDURE — 93005 ELECTROCARDIOGRAM TRACING: CPT

## 2021-04-10 NOTE — EKG
[facility]

               [address] [place] [address]

Test Date:    2021-04-10               Test Time:    16:58:39

Pat Name:     PARISH RICO      Department:   

Patient ID:   [id number]           Room:          

Gender:       F                        Technician:   MAURI

:          1950               Requested By: ANDREINA CABELLO

Order Number: [id number]            Reading MD:     

                                 Measurements

Intervals                              Axis          

Rate:         74                       P:            54

MT:           160                      QRS:          -23

QRSD:         132                      T:            97

QT:           426                                    

QTc:          473                                    

                           Interpretive Statements

SINUS RHYTHM

LEFTWARD AXIS

LEFT BUNDLE BRANCH BLOCK

ABNORMAL ECG

RI6.02

No previous ECG available for comparison

## 2021-04-10 NOTE — PHYS DOC
Past History


Past Medical History:  Anxiety, COPD, GERD, Hypertension


Additional Past Medical Histor:  COVID-19, 3 stents


Past Surgical History:  No Surgical History


Additional Past Surgical Histo:  DONALD knee repl; 3 card stents;esoph dil;lt wrist

repair; rt rad/uln open red


Smoking:  Quit Greater Than 1 Year


Alcohol Use:  Occasionally


Drug Use:  None





Adult General


Chief Complaint


Chief Complaint:  CHEST PAIN





HPI


HPI





Patient is a 70-year-old female presenting for left back pain.  Reports onset 

was this morning shortly after waking up.  Reports pain over left shoulder blade

area along muscular portions of left parathoracic muscles.  No fever, no trauma,

no other concerning signs or symptoms of red flag back pain such as IV drug use,

chronic immunosuppression with steroids etc.  Patient reports movement of left 

upper extremity exacerbates symptoms, rest makes better.  She has not taken 

anything in attempt to alleviate her symptoms.  Timing of symptoms has been 

constant but has been aggravated with left upper extremity motions as noted.  

She has felt similar episodes to this in the past that have been self-limiting. 

She does have a significant cardiac history with 3 prior stents, currently on 

appropriate therapy with aspirin, high intensity statin etc.  She does not have 

any fever, vision changes, systemic symptoms of disease, chest pain, ripping or 

tearing chest/abdominal pain, shortness of breath, abdominal pain, incontinence,

changes in motor or sensory function, no neurologic deficits





Review of Systems


Review of Systems


Fourteen body systems of review of systems have been reviewed. See HPI for 

pertinent positives and negative responses, other wise all other systems are 

negative, non-pertinent or non-contributory





Allergies


Allergies





Allergies








Coded Allergies Type Severity Reaction Last Updated Verified


 


  venom-honey bee Allergy Severe Shortness of Air 1/24/21 Yes


 


  adhesive Allergy Intermediate  1/24/21 Yes


 


  cimetidine Allergy Intermediate "toxic" 1/24/21 Yes


 


  cimetidine HCl Allergy Intermediate "toxic" 1/24/21 Yes











Physical Exam


Physical Exam


Constitutional: Well developed, well nourished, no acute distress, non-toxic 

appearance. 


HENT: Normocephalic, atraumatic, bilateral external ears normal, oropharynx 

moist, no oral exudates, nose normal. 


Eyes: PERRLA, EOMI, conjunctiva normal, no discharge.  


Neck: Normal range of motion, no tenderness, supple, no stridor.  


Cardiovascular: Heart rate regular, sinus rhythm, no murmurs rubs or gallops


Lungs & Thorax:  Bilateral breath sounds clear to auscultation 


Abdomen: Bowel sounds normal, soft, no tenderness, no masses, no pulsatile 

masses.  Nonsurgical abdomen, no peritoneal signs


Skin: Warm, dry, no erythema, no rash.  


Back: No midline tenderness with no palpable or visible abnormalities such as 

step-off or crepitus, no CVA tenderness.  There is focal tenderness to palpation

along paraspinal muscles between levels T3-T5 on the left that reproduce 

patient's symptoms with palpation of muscle belly


Extremities: No tenderness, no cyanosis, no clubbing, ROM intact, no edema.  


Neurologic: Alert and oriented X 3, grossly normal motor & sensory function, no 

focal deficits noted. 


Psychologic: Anxious affect, judgement normal, mood normal.





EKG


EKG


EKG ordered and interpreted by myself at 1700 hrs. as sinus rhythm at 74 bpm, 

unremarkable intervals, left axis deviation, T wave inversions noted in leads 

aVL, left bundle branch block present with negative Sgarbosa criteria, no STEMI


Prior EKG obtained 9/7/2019 was reviewed and identical to EKG obtained today





Radiology/Procedures


Radiology/Procedures


[]





Heart Score


C/O Chest Pain:  No


HEART Score for Chest Pain:  








HEART Score for Chest Pain Response (Comments) Value


 


History Slighlty/Non-Suspicious 0


 


ECG Nonspecific Repolarizatio 1


 


Age >45 - < 65 1


 


Risk Factors >3 Risk Factors or Hx CAD 2


 


Total  4








Risk Factors:


Risk Factors:  DM, Current or recent (<one month) smoker, HTN, HLP, family 

history of CAD, obesity.


Risk Scores:


Risk Factors:  DM, Current or recent (<one month) smoker, HTN, HLP, family 

history of CAD, obesity.





Course & Med Decision Making


Course & Med Decision Making


Hemodynamically stable patient with HPI and physical examination most consistent

 with MSK complaint in etiology, discussed most likely diagnosis of muscle 

strain to left parathoracic muscles


EKG obtained and compared to prior, unremarkable.  Discussed limited utility in 

further diagnostic work-up given classic symptoms of muscle sprain/strain.  

Patient agreed.  


Patient has prior home narcotics given for other illness, joint decision made to

 administer IM muscle relaxer while in ER that improved patient's symptoms.  

Short-term prescription of this will be written with instructions for close PCP 

follow-up in upcoming 3 to 5 days.


Strict return precautions were discussed with good understanding by patient, all

 questions and concerns addressed prior to ER departure





Karo Disclaimer


Dragon Disclaimer


This electronic medical record was generated, in whole or in part, using a voice

 recognition dictation system.





Departure


Departure:


Impression:  


   Primary Impression:  


   Strain of thoracic back region


Disposition:  01 DC HOME SELF CARE/HOMELESS


Condition:  STABLE


Referrals:  


PB SAENZ MD (PCP)


Patient Instructions:  Mid-Back Strain with [facility]





Additional Instructions:  


It is likely that you have experienced a sprain/strain to the muscles on the 

left portion of your parathoracic spine that is causing you pain. The best 

treatment for this injury is continued range of motion to prevent a frozen 

joint.  Utilizing heat and following attached instructions with previously 

prescribed home narcotic medication will help in the acute phase.  Given your hi

story of CAD, I would avoid any NSAID use.  You must call your primary care 

physician first thing on Monday to discuss ER visit today and discuss next steps

 of care, you might require outpatient referral for physical therapy or other 

specialist consultation if symptoms do not improve.  If any concerning signs or 

symptoms present prior to outpatient follow-up please do not hesitate to come 

back for repeat evaluation.  It was a pleasure to take care of you and I wish 

you the best going forward.


Scripts


Cyclobenzaprine Hcl (CYCLOBENZAPRINE HCL) 5 Mg Tablet


1 TAB PO QHS for muscle spasms, #5 TAB


   Prov: ANDREINA CABELLO DO         4/10/21











ANDREINA CABELLO DO                 Apr 10, 2021 16:59

## 2021-07-05 ENCOUNTER — HOSPITAL ENCOUNTER (EMERGENCY)
Dept: HOSPITAL 63 - ER | Age: 71
Discharge: HOME | End: 2021-07-05
Payer: MEDICARE

## 2021-07-05 ENCOUNTER — HOSPITAL ENCOUNTER (EMERGENCY)
Dept: HOSPITAL 63 - ER | Age: 71
LOS: 1 days | Discharge: HOME | End: 2021-07-06
Payer: MEDICARE

## 2021-07-05 VITALS — SYSTOLIC BLOOD PRESSURE: 144 MMHG | DIASTOLIC BLOOD PRESSURE: 82 MMHG

## 2021-07-05 VITALS — HEIGHT: 60 IN | BODY MASS INDEX: 31.47 KG/M2 | WEIGHT: 160.28 LBS

## 2021-07-05 VITALS — WEIGHT: 160.28 LBS | BODY MASS INDEX: 31.47 KG/M2 | HEIGHT: 60 IN
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
## 2021-07-05 DIAGNOSIS — Z88.8: ICD-10-CM

## 2021-07-05 DIAGNOSIS — Z87.891: ICD-10-CM

## 2021-07-05 DIAGNOSIS — I10: ICD-10-CM

## 2021-07-05 DIAGNOSIS — S70.362A: Primary | ICD-10-CM

## 2021-07-05 DIAGNOSIS — L03.116: Primary | ICD-10-CM

## 2021-07-05 DIAGNOSIS — F10.20: ICD-10-CM
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
## 2021-07-05 DIAGNOSIS — Z91.030: ICD-10-CM

## 2021-07-05 DIAGNOSIS — K21.9: ICD-10-CM
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
## 2021-07-05 DIAGNOSIS — J44.9: ICD-10-CM

## 2021-07-05 DIAGNOSIS — I25.10: ICD-10-CM

## 2021-07-05 PROCEDURE — 96374 THER/PROPH/DIAG INJ IV PUSH: CPT

## 2021-07-05 PROCEDURE — 96372 THER/PROPH/DIAG INJ SC/IM: CPT

## 2021-07-05 PROCEDURE — 90471 IMMUNIZATION ADMIN: CPT

## 2021-07-05 PROCEDURE — 36569 INSJ PICC 5 YR+ W/O IMAGING: CPT

## 2021-07-05 PROCEDURE — 99283 EMERGENCY DEPT VISIT LOW MDM: CPT

## 2021-07-05 PROCEDURE — 90715 TDAP VACCINE 7 YRS/> IM: CPT

## 2021-07-05 PROCEDURE — 99284 EMERGENCY DEPT VISIT MOD MDM: CPT

## 2021-07-05 PROCEDURE — 99285 EMERGENCY DEPT VISIT HI MDM: CPT

## 2021-07-05 NOTE — PHYS DOC
Past History


Past Medical History:  Anxiety, COPD, GERD, Hypertension


Additional Past Medical Histor:  COVID-19, 3 stents


Past Surgical History:  Other


Additional Past Surgical Histo:  DONALD knee repl; 3 card stents;esoph dil;lt wrist

repair; rt rad/uln open red


Smoking:  Quit Greater Than 1 Year


Alcohol Use:  Occasionally


Drug Use:  None





Adult General


HPI


HPI


Patient is a 70-year-old female with a past medical history significant for 

anxiety and COPD who presents to the emergency department with a chief complaint

of spider bite.  States she was here last night in the emergency department and 

was given an antibiotic then and given a prescription for antibiotics but was 

not able to get it today because she does not have a car and [facility] never came and 

delivered her antibiotics.  States that it still red and tender, 5 out of 10, 

dull and achy and requested pain medication as well.  States she did not take 

anything today for the pain.  Denies any fevers, chest pain, shortness of 

breath, abdominal pain, nausea, vomiting.  States she supposed to get her 

antibiotics in the morning via [facility] delivery.





Review of Systems


Review of Systems


Review of systems otherwise unremarkable except noted in HPI





Allergies


Allergies





Allergies








Coded Allergies Type Severity Reaction Last Updated Verified


 


  venom-honey bee Allergy Severe Shortness of Air 1/24/21 Yes


 


  adhesive Allergy Intermediate  1/24/21 Yes


 


  cimetidine Allergy Intermediate "toxic" 1/24/21 Yes


 


  cimetidine HCl Allergy Intermediate "toxic" 1/24/21 Yes











Physical Exam


Physical Exam





Constitutional: Well developed, well nourished, no acute distress, non-toxic 

appearance. []


HENT: Normocephalic, atraumatic, bilateral external ears normal, oropharynx 

moist, no oral exudates, nose normal. []


Eyes: conjunctiva normal, no discharge. [] 


Neck: Normal range of motion, no tenderness, supple, no stridor, no 

lymphadenopathy. [] 


Cardiovascular:Heart rate regular rhythm, no murmur []


Lungs & Thorax:  Bilateral breath sounds clear to auscultation []


Abdomen:  soft, no tenderness, no masses, no pulsatile masses. [] 


Skin: Warm, dry, 


Extremities: Patient has about a 1 cm lesion on inner left thigh with some 

surrounding erythema out 6 cm with no crepitus, blisters or drainage.


Neurologic: Alert and oriented X 3, normal motor function, normal sensory 

function, no focal deficits noted. []


Psychologic: Affect normal, judgement normal, mood normal. []





EKG


EKG


[]





Radiology/Procedures


Radiology/Procedures


[]





Heart Score


C/O Chest Pain:  No


Risk Factors:


Risk Factors:  DM, Current or recent (<one month) smoker, HTN, HLP, family 

history of CAD, obesity.


Risk Scores:


Risk Factors:  DM, Current or recent (<one month) smoker, HTN, HLP, family 

history of CAD, obesity.





Course & Med Decision Making


Course & Med Decision Making


Patient is a 70-year-old female who presents with spider bite


Vital signs not concerning.  Physical exam noted above.  Patient given dose of 

Rocephin IM and dose of prescribed Bactrim in the ED.


Given p.o. pain medicine.  Discussed all findings with patient and advised to 

 her antibiotics in the morning by any means necessary as she needs to 

begin this regimen.


Advised to call her primary care physician first thing in the morning to update 

on ED visit and set up a follow-up as soon as she can this week for a wound 

check to be sure antibiotics are working.


Gave strict return precautions to the ED.  Patient grateful, verbalized 

understanding and agreed with plan of discharge.





[]





Dragon Disclaimer


Dragon Disclaimer


This electronic medical record was generated, in whole or in part, using a voice

 recognition dictation system.





Departure


Departure:


Impression:  


   Primary Impression:  


   Cellulitis


Disposition:  01 HOME / SELF CARE / HOMELESS


Condition:  GOOD


Referrals:  


PB SAENZ MD (PCP)


Patient Instructions:  Cellulitis





Additional Instructions:  


Thank you for coming into the emergency department tonight and allowing us to 

take care of you.  Please read the attached information very carefully to go 

back over what we discussed.  It is very important that you call [facility] first thing

 in the morning and discuss why they did not deliver your antibiotics and need 

for antibiotic delivery immediately.  Please call your primary care physician 

first thing in the morning to update on your ED visit and set up a follow-up 

visit this week as it is very important that you have a wound check to be sure 

that the antibiotics are working as discussed.  Please come back to the ED with 

new or concerning symptoms as discussed.











CHOLO CAEBLLO MD                Jul 5, 2021 23:23

## 2021-07-05 NOTE — PHYS DOC
Past History


Past Medical History:  Anxiety, COPD, GERD, Hypertension


Additional Past Medical Histor:  COVID-19, 3 stents


Past Surgical History:  No Surgical History


Additional Past Surgical Histo:  DONALD knee repl; 3 card stents;esoph dil;lt wrist

repair; rt rad/uln open red


Smoking:  Quit Greater Than 1 Year


Alcohol Use:  Heavy


Drug Use:  None





General Adult


HPI:


HPI:


".. I felt .. like I had a itch down there on inside of my Lt Thigh...it looked 

like a insect bite.. ..maybe got it the other day .. but it is more itchy 

tonight.. the ointment I put on it did not help..."





Patient is a 70 year old female who presents with complaints of insect bite to 

left upper thigh.  Patient has approximately 8 to 10 cm for comfort area of 

erythema on the mid inside of left thigh.  There is no striation.  He is 

localized with the member of the central bite roberto.  There is no adenopathy.  No

fluctuant abscess.  Patient denies any recent travel.  No severe ill contacts.  

Does have significant medical history for anxiety, COPD, GERD, hypertension, 

COVID-19, coronary artery disease with 3 stents, bilateral knee ablations.  

Esophageal diet lesions, wrist fracture repair tobacco abuse.  Patient does not 

remember her last tetanus.  Patient denies any recent travel.  No specific ill 

contacts.  No specific history of immunosuppression.  Normally follows with Dr. Islas.





Review of Systems:


Review of Systems:


Constitutional:  Denies fever or chills 


Eyes:  Denies change in visual acuity 


HENT:  Denies nasal congestion or sore throat 


Respiratory:  Denies cough or shortness of breath 


Cardiovascular:  Denies chest pain or edema 


GI:  Denies abdominal pain, nausea, vomiting, bloody stools or diarrhea 


: Denies dysuria 


Musculoskeletal:  Denies back pain or joint pain 


Integument: Insect bite left thigh suspect spider


Neurologic:  Denies headache, focal weakness or sensory changes 


Endocrine:  Denies polyuria or polydipsia 


Lymphatic:  Denies swollen glands 


Psychiatric:  Denies depression or anxiety





Family History:


Family History:


Noncontributory presentation.





Current Medications:


Current Meds:


See nursing for home meds





Allergies:


Allergies:





Allergies








Coded Allergies Type Severity Reaction Last Updated Verified


 


  venom-honey bee Allergy Severe Shortness of Air 1/24/21 Yes


 


  adhesive Allergy Intermediate  1/24/21 Yes


 


  cimetidine Allergy Intermediate "toxic" 1/24/21 Yes


 


  cimetidine HCl Allergy Intermediate "toxic" 1/24/21 Yes











Physical Exam:


PE:





Constitutional: , no acute distress, non-toxic appearance. []


HENT: Normocephalic, atraumatic, bilateral external ears normal, oropharynx 

moist, no oral exudates, nose normal. []


Eyes: PERRLA, EOMI, conjunctiva normal, no discharge. [] 


Neck: Normal range of motion, no tenderness, supple, no stridor. [] 


Cardiovascular:Heart rate regular rhythm, no murmur []


Lungs & Thorax:  Bilateral breath sounds equal at apex  with scattered wheezes 

on auscultation []


Abdomen: Bowel sounds normal, soft, no tenderness, no masses, no pulsatile 

masses. [] Obese.  Old surgery scars.


Skin: Warm, dry, no erythema, no rash. [] Insect bite left thigh as per HPI


Back: No tenderness, no CVA tenderness. [] 


Extremities: No tenderness, no cyanosis, no clubbing, ROM intact, no edema.  

Arthritic changes.  No cording.


Neurologic: Alert and oriented X 3, normal motor function, normal sensory 

function, no focal deficits noted. []


Psychologic: Affect anxious , judgement normal, mood normal. []





EKG:


EKG:


[]





Radiology/Procedures:


Radiology/Procedures:


[]





Heart Score:


C/O Chest Pain:  N/A


Risk Factors:


Risk Factors:  DM, Current or recent (<one month) smoker, HTN, HLP, family 

history of CAD, obesity.


Risk Scores:


Score 0 - 3:  2.5% MACE over next 6 weeks - Discharge Home


Score 4 - 6:  20.3% MACE over next 6 weeks - Admit for Clinical Observation


Score 7 - 10:  72.7% MACE over next 6 weeks - Early Invasive Strategies





Course & Med Decision Making:


Course & Med Decision Making


Pertinent Labs and Imaging studies reviewed. (See chart for details)





Keep left thigh clean and dry.  Massage area Polysporin 4 times a day.  Take 

Benadryl 25-50 mg 4 times a day for itching.  Take Bactrim DS twice a day for 7 

days.  Follow-up primary care.  Return if any concerns.  Advise if a Brown 

recluse spider bite, will most likely ulcerate. 





Impression:





1.  Insect bite left thigh-suspect brown recluse





[]





Dragon Disclaimer:


Dragon Disclaimer:


This electronic medical record was generated, in whole or in part, using a voice

 recognition dictation system.





Departure


Departure:


Referrals:  


PB ISLAS MD (PCP)


Scripts


Sulfamethoxazole/Trimethoprim (BACTRIM DS TABLET) 1 Each Tablet


1 TAB PO BID for cellulitis for 7 Days, #14 TAB 0 Refills


   Prov: JESÚS LEIGH MD         7/5/21





Dragon Disclaimer


This chart was dictated in whole or in part using Voice Recognition software in 

a busy, high-work load, and often noisy Emergency Department environment.  It 

may contain unintended and wholly unrecognized errors or omissions.











JESÚS LEIGH MD            Jul 5, 2021 03:02

## 2021-07-06 VITALS — SYSTOLIC BLOOD PRESSURE: 158 MMHG | DIASTOLIC BLOOD PRESSURE: 74 MMHG

## 2021-10-01 ENCOUNTER — HOSPITAL ENCOUNTER (EMERGENCY)
Dept: HOSPITAL 63 - ER | Age: 71
Discharge: HOME | End: 2021-10-01
Payer: MEDICARE

## 2021-10-01 VITALS — HEIGHT: 60 IN | BODY MASS INDEX: 34.02 KG/M2 | WEIGHT: 173.28 LBS

## 2021-10-01 VITALS — SYSTOLIC BLOOD PRESSURE: 140 MMHG | DIASTOLIC BLOOD PRESSURE: 87 MMHG

## 2021-10-01 DIAGNOSIS — M54.2: Primary | ICD-10-CM

## 2021-10-01 DIAGNOSIS — K21.9: ICD-10-CM

## 2021-10-01 DIAGNOSIS — J44.9: ICD-10-CM

## 2021-10-01 DIAGNOSIS — Z87.891: ICD-10-CM

## 2021-10-01 DIAGNOSIS — I10: ICD-10-CM

## 2021-10-01 PROCEDURE — 99282 EMERGENCY DEPT VISIT SF MDM: CPT

## 2021-10-01 RX ADMIN — KETOROLAC TROMETHAMINE ONE MG: 30 INJECTION, SOLUTION INTRAMUSCULAR at 02:55

## 2021-10-01 RX ADMIN — DIAZEPAM ONE MG: 5 TABLET ORAL at 03:00

## 2021-10-01 NOTE — PHYS DOC
Past History


Past Medical History:  Anxiety, COPD, GERD, Hypertension


Additional Past Medical Histor:  COVID-19, 3 stents


Past Surgical History:  Other


Additional Past Surgical Histo:  DONALD knee repl; 3 card stents;esoph dil;lt wrist

repair; rt rad/uln open red


Smoking:  Quit Greater Than 1 Year


Alcohol Use:  None


Drug Use:  None





Adult General


Chief Complaint


Chief Complaint:  Neck Pain





HPI


HPI


Patient is a 70-year-old female who presents to the emergency department with 

generalized neck discomfort, 6 out of 10, dull and achy in nature.  States that 

she has had chronic neck pain for years and has an appointment with the 

orthopedist soon but it is aching more than usual.  States she has some 

hydrocodone at home that she took earlier in the day which did help but is worn 

off now.  Denies any other injuries.  Denies headache, change in vision, fevers,

chest pain, shortness of breath, abdominal pain, nausea, vomiting.  States she 

did not take any other medicines for pain including Tylenol or ibuprofen.





Review of Systems


Review of Systems


Review of systems otherwise unremarkable except noted in HPI





Allergies


Allergies





Allergies








Coded Allergies Type Severity Reaction Last Updated Verified


 


  venom-honey bee Allergy Severe Shortness of Air 1/24/21 Yes


 


  adhesive Allergy Intermediate  1/24/21 Yes


 


  cimetidine Allergy Intermediate "toxic" 1/24/21 Yes


 


  cimetidine HCl Allergy Intermediate "toxic" 1/24/21 Yes











Physical Exam


Physical Exam





Constitutional: Well developed, well nourished, no acute distress, non-toxic 

appearance. []


HENT: Normocephalic, atraumatic, bilateral external ears normal, oropharynx 

moist, no oral exudates, nose normal. []


Eyes:  conjunctiva normal, no discharge. [] 


Neck: Normal range of motion, no tenderness, supple, no stridor. [] 


Cardiovascular:Heart rate regular rhythm, no murmur []


Lungs & Thorax:  Bilateral breath sounds clear to auscultation []


Abdomen: Bowel sounds normal, soft, no tenderness, no masses, no pulsatile 

masses. [] 


Skin: Warm, dry, no erythema, no rash. [] 


Back: No tenderness, 


Extremities: No tenderness, no cyanosis, no clubbing, ROM intact, no edema. [] 


Neurologic: Alert and oriented X 3, n no focal deficits noted. []


Psychologic: Affect normal, judgement normal, mood normal. []





Current Patient Data


Vital Signs





                                   Vital Signs








  Date Time  Temp Pulse Resp B/P (MAP) Pulse Ox O2 Delivery O2 Flow Rate FiO2


 


10/1/21 02:35 97.6 84 20 156/78 (104) 95 Room Air  











EKG


EKG


[]





Radiology/Procedures


Radiology/Procedures


[]





Heart Score


C/O Chest Pain:  No


Risk Factors:


Risk Factors:  DM, Current or recent (<one month) smoker, HTN, HLP, family 

history of CAD, obesity.


Risk Scores:


Risk Factors:  DM, Current or recent (<one month) smoker, HTN, HLP, family 

history of CAD, obesity.





Course & Med Decision Making


Course & Med Decision Making


Patient is a 70-year-old female who presents with neck ache


Vital signs notable for mild hypertension.  Physical exam noted above.  No focal

 neurologic deficits.  Patient has hydrocodone at home.


Given muscle relaxers for home.  Advised on symptom management.  Advised to call

 primary care physician in the morning to set up a follow-up and discuss chronic

 pain management.


Gave return precautions to the ED.  Patient grateful, verbalized understanding 

and agreed with plan of discharge.





Dragon Disclaimer


Dragon Disclaimer


This electronic medical record was generated, in whole or in part, using a voice

 recognition dictation system.





Departure


Departure:


Impression:  


   Primary Impression:  


   Neck pain


Disposition:  01 HOME / SELF CARE / HOMELESS


Condition:  GOOD


Referrals:  


PB SAENZ MD (PCP)


Patient Instructions:  RICE - Routine Care for Injuries





Additional Instructions:  


Thank you for coming into the emergency department tonight and allowing us to 

take care of you.  Please read the attached information carefully to go over 

things we discussed.  Please continue a Tylenol regimen as well as Benadryl, 

Lidoderm patches and ice.  Please take your hydrocodone as prescribed but be 

careful not to take more than 3000 mg of Tylenol daily as this can hurt your 

liver and your hydrocodone does have Tylenol in it.  Please take your muscle 

relaxers as we discussed.  Please call your primary care physician in the 

morning to update on your ED visit and set up a follow-up to discuss chronic 

pain management.  Please come back to the ED with new or concerning symptoms as 

discussed.











CHOLO CABELLO MD                Oct 1, 2021 02:51

## 2021-11-08 VITALS — DIASTOLIC BLOOD PRESSURE: 71 MMHG | SYSTOLIC BLOOD PRESSURE: 124 MMHG

## 2021-12-15 ENCOUNTER — HOSPITAL ENCOUNTER (OUTPATIENT)
Dept: HOSPITAL 63 - RAD | Age: 71
End: 2021-12-15
Attending: PHYSICIAN ASSISTANT
Payer: MEDICARE

## 2021-12-15 DIAGNOSIS — M79.89: ICD-10-CM

## 2021-12-15 DIAGNOSIS — M19.042: Primary | ICD-10-CM

## 2021-12-15 PROCEDURE — 73140 X-RAY EXAM OF FINGER(S): CPT

## 2021-12-15 NOTE — RAD
3 views right fifth digit dated 12/15/2021.



COMPARISON: None.



CLINICAL INDICATION: Pain.



FINDINGS:



3 views of the left small finger show normal bony alignment. No displaced fracture. No periostitis or
 bone destruction. There is moderate to severe degenerative change at the DIP joint. Mild soft tissue
 swelling.



IMPRESSION:

1. No acute radiographic abnormality.

2. 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



Electronically signed by: Los Stevens MD (12/15/2021 4:45 PM) CH[facility]STOF[facility]

## 2021-12-20 ENCOUNTER — HOSPITAL ENCOUNTER (EMERGENCY)
Dept: HOSPITAL 63 - ER | Age: 71
Discharge: HOME | End: 2021-12-20
Payer: MEDICARE

## 2021-12-20 VITALS — HEIGHT: 60 IN | BODY MASS INDEX: 34.19 KG/M2 | WEIGHT: 174.17 LBS

## 2021-12-20 VITALS — SYSTOLIC BLOOD PRESSURE: 146 MMHG | DIASTOLIC BLOOD PRESSURE: 77 MMHG

## 2021-12-20 DIAGNOSIS — Z91.030: ICD-10-CM

## 2021-12-20 DIAGNOSIS — I25.10: ICD-10-CM

## 2021-12-20 DIAGNOSIS — Z20.822: ICD-10-CM

## 2021-12-20 DIAGNOSIS — J96.01: Primary | ICD-10-CM

## 2021-12-20 DIAGNOSIS — K21.9: ICD-10-CM

## 2021-12-20 DIAGNOSIS — I10: ICD-10-CM

## 2021-12-20 DIAGNOSIS — E78.5: ICD-10-CM

## 2021-12-20 DIAGNOSIS — Z87.891: ICD-10-CM

## 2021-12-20 DIAGNOSIS — J44.1: ICD-10-CM

## 2021-12-20 DIAGNOSIS — Z88.8: ICD-10-CM

## 2021-12-20 DIAGNOSIS — F41.9: ICD-10-CM

## 2021-12-20 LAB
ALBUMIN SERPL-MCNC: 3.7 G/DL (ref 3.4–5)
ALP SERPL-CCNC: 52 U/L (ref 46–116)
ALT SERPL-CCNC: 28 U/L (ref 14–59)
ANION GAP SERPL CALC-SCNC: 10 MMOL/L (ref 6–14)
AST SERPL-CCNC: 20 U/L (ref 15–37)
BASOPHILS # BLD AUTO: 0.1 X10^3/UL (ref 0–0.2)
BASOPHILS NFR BLD: 1 % (ref 0–3)
BILIRUB DIRECT SERPL-MCNC: 0.2 MG/DL (ref 0–0.2)
BILIRUB SERPL-MCNC: 0.5 MG/DL (ref 0.2–1)
CA-I SERPL ISE-MCNC: 9 MG/DL (ref 7–20)
CALCIUM SERPL-MCNC: 8.6 MG/DL (ref 8.5–10.1)
CHLORIDE SERPL-SCNC: 104 MMOL/L (ref 98–107)
CO2 SERPL-SCNC: 27 MMOL/L (ref 21–32)
CREAT SERPL-MCNC: 0.7 MG/DL (ref 0.6–1)
EOSINOPHIL NFR BLD: 0.9 X10^3/UL (ref 0–0.7)
EOSINOPHIL NFR BLD: 11 % (ref 0–3)
ERYTHROCYTE [DISTWIDTH] IN BLOOD BY AUTOMATED COUNT: 15.2 % (ref 11.5–14.5)
GFR SERPLBLD BASED ON 1.73 SQ M-ARVRAT: 83.2 ML/MIN
GLUCOSE SERPL-MCNC: 109 MG/DL (ref 70–99)
HCT VFR BLD CALC: 41.8 % (ref 36–47)
HGB BLD-MCNC: 13.9 G/DL (ref 12–15.5)
LIPASE: 104 U/L (ref 73–393)
LYMPHOCYTES # BLD: 2.8 X10^3/UL (ref 1–4.8)
LYMPHOCYTES NFR BLD AUTO: 34 % (ref 24–48)
MAGNESIUM SERPL-MCNC: 2.1 MG/DL (ref 1.8–2.4)
MCH RBC QN AUTO: 30 PG (ref 25–35)
MCHC RBC AUTO-ENTMCNC: 33 G/DL (ref 31–37)
MCV RBC AUTO: 90 FL (ref 79–100)
MONO #: 0.6 X10^3/UL (ref 0–1.1)
MONOCYTES NFR BLD: 7 % (ref 0–9)
NEUT #: 3.9 X10^3UL (ref 1.8–7.7)
NEUTROPHILS NFR BLD AUTO: 48 % (ref 31–73)
PLATELET # BLD AUTO: 203 X10^3/UL (ref 140–400)
POTASSIUM SERPL-SCNC: 3.2 MMOL/L (ref 3.5–5.1)
PROT SERPL-MCNC: 6.4 G/DL (ref 6.4–8.2)
RBC # BLD AUTO: 4.66 X10^6/UL (ref 3.5–5.4)
SODIUM SERPL-SCNC: 141 MMOL/L (ref 136–145)
WBC # BLD AUTO: 8.2 X10^3/UL (ref 4–11)

## 2021-12-20 PROCEDURE — 84484 ASSAY OF TROPONIN QUANT: CPT

## 2021-12-20 PROCEDURE — 84443 ASSAY THYROID STIM HORMONE: CPT

## 2021-12-20 PROCEDURE — 71045 X-RAY EXAM CHEST 1 VIEW: CPT

## 2021-12-20 PROCEDURE — C9803 HOPD COVID-19 SPEC COLLECT: HCPCS

## 2021-12-20 PROCEDURE — G0238 OTH RESP PROC, INDIV: HCPCS

## 2021-12-20 PROCEDURE — 85730 THROMBOPLASTIN TIME PARTIAL: CPT

## 2021-12-20 PROCEDURE — 87426 SARSCOV CORONAVIRUS AG IA: CPT

## 2021-12-20 PROCEDURE — 85025 COMPLETE CBC W/AUTO DIFF WBC: CPT

## 2021-12-20 PROCEDURE — 80048 BASIC METABOLIC PNL TOTAL CA: CPT

## 2021-12-20 PROCEDURE — 36415 COLL VENOUS BLD VENIPUNCTURE: CPT

## 2021-12-20 PROCEDURE — 85379 FIBRIN DEGRADATION QUANT: CPT

## 2021-12-20 PROCEDURE — 80076 HEPATIC FUNCTION PANEL: CPT

## 2021-12-20 PROCEDURE — 99285 EMERGENCY DEPT VISIT HI MDM: CPT

## 2021-12-20 PROCEDURE — 94640 AIRWAY INHALATION TREATMENT: CPT

## 2021-12-20 PROCEDURE — 85610 PROTHROMBIN TIME: CPT

## 2021-12-20 PROCEDURE — 82550 ASSAY OF CK (CPK): CPT

## 2021-12-20 PROCEDURE — 83690 ASSAY OF LIPASE: CPT

## 2021-12-20 PROCEDURE — U0003 INFECTIOUS AGENT DETECTION BY NUCLEIC ACID (DNA OR RNA); SEVERE ACUTE RESPIRATORY SYNDROME CORONAVIRUS 2 (SARS-COV-2) (CORONAVIRUS DISEASE [COVID-19]), AMPLIFIED PROBE TECHNIQUE, MAKING USE OF HIGH THROUGHPUT TECHNOLOGIES AS DESCRIBED BY CMS-2020-01-R: HCPCS

## 2021-12-20 PROCEDURE — 83735 ASSAY OF MAGNESIUM: CPT

## 2021-12-20 PROCEDURE — 96365 THER/PROPH/DIAG IV INF INIT: CPT

## 2021-12-20 PROCEDURE — 96375 TX/PRO/DX INJ NEW DRUG ADDON: CPT

## 2021-12-20 PROCEDURE — 93005 ELECTROCARDIOGRAM TRACING: CPT

## 2021-12-20 PROCEDURE — 83880 ASSAY OF NATRIURETIC PEPTIDE: CPT

## 2021-12-20 NOTE — EKG
[facility] [address], [place] [address]

Test Date:    2021               Test Time:    04:19:15

Pat Name:     PARISH RICO      Department:   

Patient ID:   [id number]           Room:          

Gender:       F                        Technician:   

:          1950               Requested By: JESÚS LEIGH

Order Number: [id number]            Reading MD:     

                                 Measurements

Intervals                              Axis          

Rate:         76                       P:            

DE:                                    QRS:          218

QRSD:         134                      T:            79

QT:           426                                    

QTc:          484                                    

                           Interpretive Statements

ATRIAL FLUTTER

ABNORMAL RIGHT SUPERIOR AXIS DEVIATION

NON SPECIFIC INTRAVENTRICULAR BLOCK

QRS(T) CONTOUR ABNORMALITY

CONSISTENT WITH HIGH LATERAL INFARCT

AGE UNDETERMINED

CONSISTENT WITH INFERIOR INFARCT

PROBABLY OLD

ABNORMAL ECG

RI6.02

No previous ECG available for comparison

## 2021-12-20 NOTE — PHYS DOC
Past History


Past Medical History:  Anxiety, COPD, GERD, Hypertension


Additional Past Medical Histor:  COVID-19, 3 stents, hemorrhoids


 (JESÚS LEIGH MD)


Past Surgical History:  Other


Additional Past Surgical Histo:  DONALD knee repl; 3 card stents;esoph dil;lt wrist

repair; rt rad/uln open red


 (JESÚS LEIGH MD)


Smoking:  Quit Greater Than 1 Year


Alcohol Use:  None


Drug Use:  None


 (JESÚS LEIGH MD)





General Adult


HPI:


HPI:


".. My breathing really ... bad tonight...  it my COPD.. short of breath.. 

wheezing bad.. "  "  I am worse than ....when I was...  admitted last month for 

my COPD..."





Patient is a 70 year old female who presents with above hx and complaints of 

COPD exacerbation.   Patient has history of seasonal COPD exacerbations.  Last 

admitted for COPD exacerbation in November of this year.  Patient history of 

GERD, hypertension, hyperlipidemia, coronary artery disease, history of previous

percutaneous coronary artery stents x3 last in .  Degenerative joint 

disease, degenerative disc disease, esophageal strictures status post prior 

dilations, left bundle branch block, history of alcohol abuse.  And multiple 

allergies.  Patient has not smoked for the last 12 years.  Patient has had COVID

vaccination, flu vaccination.   Patient has never required intubation for her 

COPD exacerbations.  Patient has no home oxygen currently. The pt follows with 

Dr. Saenz


 (JESÚS LEIGH MD)





Review of Systems:


Review of Systems:


Constitutional:  Denies fever or chills 


Eyes:  Denies change in visual acuity 


HENT:  Denies nasal congestion or sore throat 


Respiratory: Complains of cough, wheezing, shortness of breath 


Cardiovascular:  Denies chest pain or edema 


GI:  Denies abdominal pain, nausea, vomiting, bloody stools or diarrhea 


: Denies dysuria 


Musculoskeletal:  Denies back pain or joint pain 


Integument:  Denies rash 


Neurologic:  Denies headache, focal weakness or sensory changes 


Endocrine:  Denies polyuria or polydipsia 


Lymphatic:  Denies swollen glands 


Psychiatric:  Denies depression or anxiety


 (JESÚS LEIGH MD)





Family History:


Family History:


Father  in 80s because of cerebral aneurysm, mother age of 90 has metastatic

breast cancer and CHF


 (JESÚS LEIGH MD)





Current Medications:


Current Meds:


See nursing for home meds


 (JESÚS LEIGH MD)





Allergies:


Allergies:





Allergies








Coded Allergies Type Severity Reaction Last Updated Verified


 


  venom-honey bee Allergy Severe Shortness of Air 21 Yes


 


  adhesive Allergy Intermediate  21 Yes


 


  cimetidine Allergy Intermediate "toxic" 21 Yes


 


  cimetidine HCl Allergy Intermediate "toxic" 21 Yes








 (JESÚS LEIGH MD)





Physical Exam:


PE:





Constitutional: Moderate acute distress, non-toxic appearance. []


HENT: Normocephalic, atraumatic, bilateral external ears normal, oropharynx 

moist, no oral exudates, nose clear rhinorrhea []


Eyes: PERRLA, EOMI, conjunctiva normal, no discharge. [] 


Neck: Normal range of motion, no tenderness, supple, no stridor. [] 


Cardiovascular: Irregular heart rate regular rhythm, no murmur [] PMI to the 

left


Lungs & Thorax:  Bilateral breath sounds equal apex with scattered wheezing 

throughout on auscultation [] sitting in tripod position.  Does have some 

intercostal retractions..  Pursed lip breathing


Abdomen: Bowel sounds normal, soft, no tenderness, no masses, no pulsatile 

masses.  Old surgery scars.


Skin: Warm, diaphoretic,, no erythema, no rash. [] 


Back: No tenderness, no CVA tenderness. [] 


Extremities: No tenderness, no cyanosis, no clubbing, ROM intact, some ankle 

edema, no cording appreciated


Neurologic: Alert and oriented X 3, moves all extremities on request, does have 

distal sensory, is ambulatory,, no focal deficits noted. []


Psychologic: Affect anxious, judgement normal, mood normal. []


 (JESÚS LEIGH MD)





EKG:


EKG:


My interpretation EKG shows a sinus rhythm at 79 bpm.  Does have 

interventricular block as well as contour abnormalities.  Is an abnormal EKG.  

There is some difficulty in interpreting because of patient's respiratory 

distress has caused a loss of baseline in assessment.  [] Time of EKG is 0420 

hrs.


 (JESÚS LEIGH MD)





Radiology/Procedures:


Radiology/Procedures:


[][facility] [address], [place] [address]


                                 [phone]


                                        


                                 IMAGING REPORT





                                     Signed





PATIENT: PARISH RICO LACCOUNT: [id number]     MRN#: [id number]


: 1950           LOCATION: ER              AGE: 70


SEX: F                    EXAM DT: 21         ACCESSION#: [id number]


STATUS: REG ER            ORD. PHYSICIAN: JESÚS LEIGH MD


REASON: dyspnea ,  


PROCEDURE: PORTABLE CHEST 1V





EXAM: CHEST ONE VIEW.





HISTORY: Dyspnea.





COMPARISON: 2021.





FINDINGS: A frontal view of the chest is obtained.





There are no confluent infiltrates. There is no pneumothorax or pleural 

effusion. The heart is not enlarged. There are atherosclerotic calcifications of

 the aorta.





IMPRESSION: 


1. No confluent infiltrates.





Electronically signed by: GABBY Balderas MD (2021 6:11 AM) [id number]














DICTATED AND SIGNED BY:     KAILEY BALDERAS MD


DATE:     21





CC: PB SAENZ MD; JESÚS LEIGH MD ~MTH0 0


 (JESÚS LEIGH MD)





Heart Score:


C/O Chest Pain:  Yes


HEART Score for Chest Pain:  








HEART Score for Chest Pain Response (Comments) Value


 


History Moderately Suspicious 1


 


ECG Nonspecific Repolarizatio 1


 


Age > 65 2


 


Risk Factors                            1 or 2 Risk Factors 1


 


Troponin < Normal Limit 0


 


Total  5








Risk Factors:


Risk Factors:  DM, Current or recent (<one month) smoker, HTN, HLP, family 

history of CAD, obesity.


Risk Scores:


Score 0 - 3:  2.5% MACE over next 6 weeks - Discharge Home


Score 4 - 6:  20.3% MACE over next 6 weeks - Admit for Clinical Observation


Score 7 - 10:  72.7% MACE over next 6 weeks - Early Invasive Strategies


 (JESÚS LEIGH MD)





Course & Med Decision Making:


Course & Med Decision Making


Pertinent Labs and Imaging studies reviewed. (See chart for details)





Discussed with Dr. Little and Pt.  of possible admission if unable to get 

saturation above 90 on room air.   Labs pending at shift change.    Will need 

rule out because of pt.cardiac hx.  Endorsed to Dr. Mtz at shift change. 








Impression:





1. COPD Exacerbation


2. Respiratory Failure - Hypoxia


3. Hx. CADz- prior Stents x3





[]


 (JESÚS LEIGH MD)


Course & Med Decision Making


The patient states feeling better and having less coughing.  She has been able 

to maintain her oxygen saturation above 92%.  Her repeat troponin is negative.  

She is stable for discharge at this time.


 (BESSIE MTZ DO)


Dragon Disclaimer:


Dragon Disclaimer:


This electronic medical record was generated, in whole or in part, using a voice

 recognition dictation system.


 (JESÚS LEIGH MD)





Departure


Departure:


Impression:  


   Primary Impression:  


   COPD exacerbation


Disposition:   HOME / SELF CARE / HOMELESS


Condition:  STABLE


Referrals:  


PB SAENZ MD (PCP)


Patient Instructions:  Chronic Obstructive Pulmonary Disease Exacerbation, 

Easy-to-Read


Scripts


Prednisone (PREDNISONE) 50 Mg Tablet


1 TAB PO DAILY for COPD, #5 TAB


   Prov: BESSIE MTZ DO         21





Dragon Disclaimer


This chart was dictated in whole or in part using Voice Recognition software in 

a busy, high-work load, and often noisy Emergency Department environment.  It 

may contain unintended and wholly unrecognized errors or omissions.


 (JESÚS LEIGH MD)





Dragon Disclaimer


This chart was dictated in whole or in part using Voice Recognition software in 

a busy, high-work load, and often noisy Emergency Department environment.  It 

may contain unintended and wholly unrecognized errors or omissions.


 (JESÚS LEIGH MD)





Dragon Disclaimer


This chart was dictated in whole or in part using Voice Recognition software in 

a busy, high-work load, and often noisy Emergency Department environment.  It 

may contain unintended and wholly unrecognized errors or omissions.


 (JESÚS LEIGH MD)











JESÚS LEIGH MD           Dec 20, 2021 04:14


BESSIE MTZ DO                 Dec 20, 2021 09:35

## 2022-01-18 ENCOUNTER — HOSPITAL ENCOUNTER (EMERGENCY)
Dept: HOSPITAL 63 - ER | Age: 72
Discharge: HOME | End: 2022-01-18
Payer: MEDICARE

## 2022-01-18 VITALS — WEIGHT: 174.17 LBS | BODY MASS INDEX: 34.19 KG/M2 | HEIGHT: 60 IN

## 2022-01-18 VITALS — DIASTOLIC BLOOD PRESSURE: 107 MMHG | SYSTOLIC BLOOD PRESSURE: 151 MMHG

## 2022-01-18 DIAGNOSIS — K21.9: ICD-10-CM

## 2022-01-18 DIAGNOSIS — J11.1: ICD-10-CM

## 2022-01-18 DIAGNOSIS — I10: ICD-10-CM

## 2022-01-18 DIAGNOSIS — U07.1: Primary | ICD-10-CM

## 2022-01-18 DIAGNOSIS — Z87.891: ICD-10-CM

## 2022-01-18 LAB
INFLUENZA A PATIENT: POSITIVE
INFLUENZA B PATIENT: NEGATIVE

## 2022-01-18 PROCEDURE — 87428 SARSCOV & INF VIR A&B AG IA: CPT

## 2022-01-18 NOTE — PHYS DOC
Past History


Past Medical History:  Anxiety, COPD, GERD, Hypertension


Additional Past Medical Histor:  3 stents, hemorrhoids


Past Surgical History:  Other


Additional Past Surgical Histo:  DONALD knee repl; 3 card stents;esoph dil;lt wrist

repair; rt rad/uln open red


Smoking:  Quit Greater Than 1 Year


Alcohol Use:  None


Drug Use:  None





Adult General


Chief Complaint


Chief Complaint:  SHORTNESS OF BREATH





HPI


HPI





Patient is a 71-year-old female presenting via EMS for COVID contact.  Patient 

who is well-known to our facility and was recently seen, evaluated, and treated 

for COPD exacerbation with steroids and antibiotics 1 month prior resents today 

requesting COVID swab.  She is unvaccinated and states that a close colleague 

that she was around approximately 1 week ago informed her that he was positive 

for the virus.  Patient reports she has been at baseline health but after 

hearing the news was concerned due to slight increase in postnasal drip 

prompting her to come in for evaluation.  Otherwise denies any fever, chest 

pain, shortness of breath past baseline or other concerning symptoms





Review of Systems


Review of Systems


Fourteen body systems of review of systems have been reviewed. See HPI for 

pertinent positives and negative responses, other wise all other systems are 

negative, non-pertinent or non-contributory





Allergies


Allergies





Allergies








Coded Allergies Type Severity Reaction Last Updated Verified


 


  venom-honey bee Allergy Severe Shortness of Air 1/18/22 Yes


 


  adhesive Allergy Intermediate  1/18/22 Yes


 


  cimetidine Allergy Intermediate "toxic" 1/18/22 Yes


 


  cimetidine HCl Allergy Intermediate "toxic" 1/18/22 Yes











Physical Exam


Physical Exam


Constitutional: Well developed, well nourished, no acute distress, non-toxic 

appearance. 


HENT: Normocephalic, atraumatic, bilateral external ears normal, oropharynx 

moist, no oral exudates, nose normal. 


Eyes: PERRLA, EOMI, conjunctiva normal, no discharge.  


Neck: Normal range of motion, no tenderness, supple, no stridor.  


Cardiovascular: Heart rate regular, sinus rhythm, no murmurs rubs or gallops


Lungs & Thorax:  Bilateral breath sounds clear to auscultation 


Abdomen: Bowel sounds normal, soft, no tenderness, no masses, no pulsatile 

masses.  Nonsurgical abdomen, no peritoneal signs


Skin: Warm, dry, no erythema, no rash.  


Back: No tenderness, no CVA tenderness.  


Extremities: No tenderness, no cyanosis, no clubbing, ROM intact, no edema.  


Neurologic: Alert and oriented X 3, grossly normal motor & sensory function, no 

focal deficits noted. 


Psychologic: Affect normal, judgement normal, mood normal.





Current Patient Data


Vital Signs





Vital Signs








  Date Time  Temp Pulse Resp B/P (MAP) Pulse Ox O2 Delivery O2 Flow Rate FiO2


 


1/18/22 14:44 98.1 99 18 147/70 (95) 95 Room Air  








Vital Signs








  Date Time  Temp Pulse Resp B/P (MAP) Pulse Ox O2 Delivery O2 Flow Rate FiO2


 


1/18/22 16:06  104 18 151/107 (122) 94 Room Air  


 


1/18/22 14:44 98.1       











EKG


EKG


[]





Radiology/Procedures


Radiology/Procedures


[]





Heart Score


C/O Chest Pain:  No


Risk Factors:


Risk Factors:  DM, Current or recent (<one month) smoker, HTN, HLP, family 

history of CAD, obesity.


Risk Scores:


Risk Factors:  DM, Current or recent (<one month) smoker, HTN, HLP, family 

history of CAD, obesity.





Course & Med Decision Making


Course & Med Decision Making


ABCs unremarkable


HPI physical exam and ER work-up positive for influenza and COVID-19


Patient reports she has been at baseline health and asymptomatic but presented 

nearly because she was notified of positive COVID-19 exposure


I disclosed given her history and risk factors need for further work-up but 

patient deferred.  She is not vaccinated against COVID-19.  As such, appropriate

 self quarantine and supportive care practices advised with close PCP follow-up 

recommended when safe to do so





Dragon Disclaimer


Dragon Disclaimer


This electronic medical record was generated, in whole or in part, using a voice

 recognition dictation system.





Departure


Departure:


Impression:  


   Primary Impression:  


   COVID-19


   Additional Impression:  


   Influenza B


Disposition:  01 HOME / SELF CARE / HOMELESS


Condition:  STABLE


Referrals:  


PB SAENZ MD (PCP)





Additional Instructions:  


As discussed prior to ER departure, you were tested and found to be positive for

 COVID-19 and influenza B.  You have been at baseline health and asymptomatic 

and presented today due to close contact only.  There are no current medications

 or anything beyond supportive care that are indicated at present and so, 

continued use of home inhalers, avoidance of chemicals/fumes/smoke etc., and 

deep breathing exercises are advised.  Please follow CDC recommendations 

regarding self quarantine protocols especially given the fact that you did not 

get your COVID-19 vaccination.  You should return to the ED if you develop 

worsening cough, shortness of breath, chest pain, or any other new or concerning

 symptoms.





Problem Qualifiers











ANDREINA CABELLO DO                 Jan 18, 2022 14:53

## 2022-03-13 ENCOUNTER — HOSPITAL ENCOUNTER (OUTPATIENT)
Dept: HOSPITAL 63 - ER | Age: 72
Setting detail: OBSERVATION
LOS: 2 days | Discharge: HOME | End: 2022-03-15
Attending: INTERNAL MEDICINE | Admitting: INTERNAL MEDICINE
Payer: MEDICARE

## 2022-03-13 VITALS — HEIGHT: 60 IN | WEIGHT: 179.46 LBS | BODY MASS INDEX: 35.23 KG/M2

## 2022-03-13 VITALS — SYSTOLIC BLOOD PRESSURE: 133 MMHG | DIASTOLIC BLOOD PRESSURE: 82 MMHG

## 2022-03-13 DIAGNOSIS — Z95.1: ICD-10-CM

## 2022-03-13 DIAGNOSIS — Z96.653: ICD-10-CM

## 2022-03-13 DIAGNOSIS — M19.90: ICD-10-CM

## 2022-03-13 DIAGNOSIS — I10: ICD-10-CM

## 2022-03-13 DIAGNOSIS — E78.5: ICD-10-CM

## 2022-03-13 DIAGNOSIS — I25.10: ICD-10-CM

## 2022-03-13 DIAGNOSIS — Z87.891: ICD-10-CM

## 2022-03-13 DIAGNOSIS — Z79.899: ICD-10-CM

## 2022-03-13 DIAGNOSIS — Z95.5: ICD-10-CM

## 2022-03-13 DIAGNOSIS — Z20.822: ICD-10-CM

## 2022-03-13 DIAGNOSIS — Z98.51: ICD-10-CM

## 2022-03-13 DIAGNOSIS — K21.9: ICD-10-CM

## 2022-03-13 DIAGNOSIS — J44.1: Primary | ICD-10-CM

## 2022-03-13 DIAGNOSIS — Z90.49: ICD-10-CM

## 2022-03-13 DIAGNOSIS — Z98.890: ICD-10-CM

## 2022-03-13 LAB
ALBUMIN SERPL-MCNC: 3.8 G/DL (ref 3.4–5)
ALBUMIN/GLOB SERPL: 1.2 {RATIO} (ref 1–1.7)
ALP SERPL-CCNC: 51 U/L (ref 46–116)
ALT SERPL-CCNC: 27 U/L (ref 14–59)
ANION GAP SERPL CALC-SCNC: 10 MMOL/L (ref 6–14)
AST SERPL-CCNC: 17 U/L (ref 15–37)
BASOPHILS # BLD AUTO: 0.2 X10^3/UL (ref 0–0.2)
BASOPHILS NFR BLD: 2 % (ref 0–3)
BILIRUB SERPL-MCNC: 0.7 MG/DL (ref 0.2–1)
BUN/CREAT SERPL: 8 (ref 6–20)
CA-I SERPL ISE-MCNC: 6 MG/DL (ref 7–20)
CALCIUM SERPL-MCNC: 8.8 MG/DL (ref 8.5–10.1)
CHLORIDE SERPL-SCNC: 104 MMOL/L (ref 98–107)
CO2 SERPL-SCNC: 27 MMOL/L (ref 21–32)
CREAT SERPL-MCNC: 0.8 MG/DL (ref 0.6–1)
EOSINOPHIL NFR BLD: 1.2 X10^3/UL (ref 0–0.7)
EOSINOPHIL NFR BLD: 15 % (ref 0–3)
ERYTHROCYTE [DISTWIDTH] IN BLOOD BY AUTOMATED COUNT: 14 % (ref 11.5–14.5)
GFR SERPLBLD BASED ON 1.73 SQ M-ARVRAT: 71.1 ML/MIN
GLOBULIN SER-MCNC: 3.2 G/DL (ref 2.2–3.8)
GLUCOSE SERPL-MCNC: 94 MG/DL (ref 70–99)
HCT VFR BLD CALC: 47.1 % (ref 36–47)
HGB BLD-MCNC: 15.4 G/DL (ref 12–15.5)
INFLUENZA A PATIENT: NEGATIVE
INFLUENZA B PATIENT: NEGATIVE
LYMPHOCYTES # BLD: 1.5 X10^3/UL (ref 1–4.8)
LYMPHOCYTES NFR BLD AUTO: 20 % (ref 24–48)
MCH RBC QN AUTO: 30 PG (ref 25–35)
MCHC RBC AUTO-ENTMCNC: 33 G/DL (ref 31–37)
MCV RBC AUTO: 91 FL (ref 79–100)
MONO #: 0.5 X10^3/UL (ref 0–1.1)
MONOCYTES NFR BLD: 6 % (ref 0–9)
NEUT #: 4.2 X10^3UL (ref 1.8–7.7)
NEUTROPHILS NFR BLD AUTO: 56 % (ref 31–73)
PLATELET # BLD AUTO: 237 X10^3/UL (ref 140–400)
POTASSIUM SERPL-SCNC: 3.8 MMOL/L (ref 3.5–5.1)
PROT SERPL-MCNC: 7 G/DL (ref 6.4–8.2)
RBC # BLD AUTO: 5.2 X10^6/UL (ref 3.5–5.4)
SODIUM SERPL-SCNC: 141 MMOL/L (ref 136–145)
WBC # BLD AUTO: 7.5 X10^3/UL (ref 4–11)

## 2022-03-13 PROCEDURE — 85379 FIBRIN DEGRADATION QUANT: CPT

## 2022-03-13 PROCEDURE — G0378 HOSPITAL OBSERVATION PER HR: HCPCS

## 2022-03-13 PROCEDURE — 93005 ELECTROCARDIOGRAM TRACING: CPT

## 2022-03-13 PROCEDURE — 87428 SARSCOV & INF VIR A&B AG IA: CPT

## 2022-03-13 PROCEDURE — 87040 BLOOD CULTURE FOR BACTERIA: CPT

## 2022-03-13 PROCEDURE — 84484 ASSAY OF TROPONIN QUANT: CPT

## 2022-03-13 PROCEDURE — 96361 HYDRATE IV INFUSION ADD-ON: CPT

## 2022-03-13 PROCEDURE — 96374 THER/PROPH/DIAG INJ IV PUSH: CPT

## 2022-03-13 PROCEDURE — 94640 AIRWAY INHALATION TREATMENT: CPT

## 2022-03-13 PROCEDURE — 80053 COMPREHEN METABOLIC PANEL: CPT

## 2022-03-13 PROCEDURE — 85027 COMPLETE CBC AUTOMATED: CPT

## 2022-03-13 PROCEDURE — 36415 COLL VENOUS BLD VENIPUNCTURE: CPT

## 2022-03-13 PROCEDURE — 96376 TX/PRO/DX INJ SAME DRUG ADON: CPT

## 2022-03-13 PROCEDURE — 99285 EMERGENCY DEPT VISIT HI MDM: CPT

## 2022-03-13 PROCEDURE — 71045 X-RAY EXAM CHEST 1 VIEW: CPT

## 2022-03-13 PROCEDURE — G0379 DIRECT REFER HOSPITAL OBSERV: HCPCS

## 2022-03-13 PROCEDURE — 85025 COMPLETE CBC W/AUTO DIFF WBC: CPT

## 2022-03-13 PROCEDURE — 83605 ASSAY OF LACTIC ACID: CPT

## 2022-03-13 NOTE — EKG
[facility] [address], [place] [address]

Test Date:    2022               Test Time:    19:00:58

Pat Name:     PARISH RICO      Department:   

Patient ID:   [id number]           Room:          

Gender:       F                        Technician:   

:          1950               Requested By: TAYLOR RONDON

Order Number: [id number]            Reading MD:     

                                 Measurements

Intervals                              Axis          

Rate:         85                       P:            56

ND:           162                      QRS:          -24

QRSD:         124                      T:            121

QT:           410                                    

QTc:          494                                    

                           Interpretive Statements

SINUS RHYTHM

LEFTWARD AXIS

LVH WITH REPOLARIZATION ABNORMALITY

ABNORMAL ECG

RI6.01

No previous ECG available for comparison

## 2022-03-13 NOTE — RAD
EXAMINATION: XR CHEST 1V.



HISTORY: 71 years  Female  Reason: SOB  Hx: COPD / Spl. 



COMPARISON: December 20, 2021.



Findings: There is chronic appearing interstitial thickening similar to the previous exam with no def
inite acute infiltrate. The heart size is normal. There is no effusion or pneumothorax.



The mediastinum and haley appear unremarkable.



Impression: No definite acute process.



Electronically signed by: Antonio Randall MD (3/13/2022 9:21 PM) [id number]

## 2022-03-13 NOTE — PHYS DOC
Past History


Past Medical History:  Anxiety, COPD, GERD, Hypertension


Additional Past Medical Histor:  3 stents, hemorrhoids; left bundle branch block


 (TAYLOR RONDON)


Past Surgical History:  Other


Additional Past Surgical Histo:  DONALD knee repl; 3 card stents;esoph dil;lt wrist

repair; rt rad/uln open red


 (TAYLOR RONDON)


Smoking:  Quit Greater Than 1 Year


Alcohol Use:  Occasionally


Drug Use:  None


 (TAYLOR RONDON)





General Adult


EDM:


Chief Complaint:  SHORTNESS OF BREATH





HPI:


HPI:





Patient is a 71-year-old female who presents with shortness of breath and cough 

x1 week.  Patient has a history of COPD.  No fever.  Denies recent illness.  "I 

have this happen sometimes because of my COPD but it has been worse this week 

and not getting better".  Patient's been using inhalers at home with little 

relief.  Denies chest pain.  History of anxiety, hypertension, COPD, GERD.


 (TAYLOR RONDON)





Review of Systems:


Review of Systems:


ROS


At least 10 ROS systems have been reviewed and are negative except as documented

in the HPI.


General: Negative except as outlined in HPI above.


Skin: Negative except as outlined in HPI above.


HEENT: Negative except as outlined in HPI above.


Neck: Negative except as outlined in HPI above.


Respiratory: Negative except as outlined in HPI above..


Cardiovascular: Negative except as outlined in HPI above.


Abdomen: Negative except as outlined in HPI above.


: Negative except as outlined in HPI above.


Back/MSK: Negative except as outlined in HPI above.


Neuro: Negative except as outlined in HPI above.


Psych: Negative except as outlined in HPI above.


 (TAYLOR RONDON)





Current Medications:


Current Meds:





Current Medications








 Medications


  (Trade)  Dose


 Ordered  Sig/Mar  Start Time


 Stop Time Status Last Admin


Dose Admin


 


 Albuterol/


 Ipratropium


  (Duoneb)  3 ml  1X  ONCE  3/13/22 19:00


 3/13/22 19:01 DC 3/13/22 19:14


3 ML


 


 Methylprednisolone


 Sodium Succinate


  (SOLU-Medrol


 125MG VIAL)  125 mg  1X  ONCE  3/13/22 19:00


 3/13/22 19:01 DC 3/13/22 19:14


125 MG








 (TAYLOR RONDON)





Allergies:


Allergies:





Allergies








Coded Allergies Type Severity Reaction Last Updated Verified


 


  venom-honey bee Allergy Severe Shortness of Air 1/18/22 Yes


 


  adhesive Allergy Intermediate  1/18/22 Yes


 


  cimetidine Allergy Intermediate "toxic" 1/18/22 Yes


 


  cimetidine HCl Allergy Intermediate "toxic" 1/18/22 Yes








 (TAYLOR RONDON APRN)





Physical Exam:


PE:





Constitutional: Well developed, well nourished, no acute distress, non-toxic 

appearance. []


HENT:  bilateral external ears normal, oropharynx moist, no oral exudates


Eyes: PERRLA,conjunctiva normal, no discharge. [] 


Neck: Normal range of motion, no tenderness, supple 


Cardiovascular:Heart rate regular rhythm, no murmur []


Lungs & Thorax: Inspiratory and expiratory wheezes heard throughout, coarse 

bilateral lower lobes


Abdomen: Bowel sounds normal, soft, no tenderness, no masses


Skin: Warm, dry, no erythema, no rash. [] 


Back: No tenderness, no CVA tenderness. [] 


Extremities: No tenderness, no cyanosis, no clubbing, ROM intact, no edema. [] 


Neurologic: Alert and oriented X 3, normal motor function, normal sensory 

function, no focal deficits noted. []


Psychologic: Affect normal, judgement normal, mood normal. []


 (TAYLOR RONDON APRN)





Current Patient Data:


Labs:





                                Laboratory Tests








Test


 3/13/22


19:07


 


White Blood Count


 7.5 x10^3/uL


(4.0-11.0)


 


Red Blood Count


 5.20 x10^6/uL


(3.50-5.40)


 


Hemoglobin


 15.4 g/dL


(12.0-15.5)


 


Hematocrit


 47.1 %


(36.0-47.0)  H


 


Mean Corpuscular Volume


 91 fL ()





 


Mean Corpuscular Hemoglobin 30 pg (25-35)  


 


Mean Corpuscular Hemoglobin


Concent 33 g/dL


(31-37)


 


Red Cell Distribution Width


 14.0 %


(11.5-14.5)


 


Platelet Count


 237 x10^3/uL


(140-400)


 


Neutrophils (%) (Auto) 56 % (31-73)  


 


Lymphocytes (%) (Auto) 20 % (24-48)  L


 


Monocytes (%) (Auto) 6 % (0-9)  


 


Eosinophils (%) (Auto) 15 % (0-3)  H


 


Basophils (%) (Auto) 2 % (0-3)  


 


Neutrophils # (Auto)


 4.2 x10^3uL


(1.8-7.7)


 


Lymphocytes # (Auto)


 1.5 x10^3/uL


(1.0-4.8)


 


Monocytes # (Auto)


 0.5 x10^3/uL


(0.0-1.1)


 


Eosinophils # (Auto)


 1.2 x10^3/uL


(0.0-0.7)  H


 


Basophils # (Auto)


 0.2 x10^3/uL


(0.0-0.2)








Vital Signs:





                                   Vital Signs








  Date Time  Temp Pulse Resp B/P (MAP) Pulse Ox O2 Delivery O2 Flow Rate FiO2


 


3/13/22 19:21     96 Room Air  


 


3/13/22 18:19  99 28 150/109 (123)    








 (TAYLOR RONDON)





EKG:


EKG:


Sinus rhythm.  Heart rate 85 bpm.  No ST elevation or depression.  Read by Dr. Ward.  []


 (TAYLOR RONDON)





Radiology/Procedures:


Radiology/Procedures:


[]


 (TAYLOR RONDON)





Heart Score:


C/O Chest Pain:  No


Risk Factors:


Risk Factors:  DM, Current or recent (<one month) smoker, HTN, HLP, family 

history of CAD, obesity.


Risk Scores:


Score 0 - 3:  2.5% MACE over next 6 weeks - Discharge Home


Score 4 - 6:  20.3% MACE over next 6 weeks - Admit for Clinical Observation


Score 7 - 10:  72.7% MACE over next 6 weeks - Early Invasive Strategies


 (TAYLOR RONDON)





Course & Med Decision Making:


Course & Med Decision Making


Pertinent Labs and Imaging studies reviewed. (See chart for details)





[] 71-year-old female presents with shortness of breath and cough for 1 week.  

Patient does have a history of COPD but states that symptoms have not been 

improving.  Work-up in ER consisted of labs, urinalysis, EKG, chest x-ray.





Labs unremarkable.  Patient given albuterol treatment.  Reports that symptoms 

have improved but still very short of breath.  Patient ambulated to the bathroom

 and oxygen level dropped down to mid 80s.  Took patient a few minutes to 

recover.  Patient is very anxious and feeling very short of breath.  Patient is 

stating that she is unable to go home because of shortness of breath.  Rapid 

Covid swab sent.  Patient is not vaccinated for COVID-19.





At 2115I spoke with Dr. Jordan who will be accepting patient on med telemetry for

 COPD exacerbation and PUI.





Updated patient on admission status.  Advised patient that she would be staying 

at [facility] for further management.  Patient agrees with admission plan and is

 appreciative.








 (TAYLOR RONDON)





Dragon Disclaimer:


Dragon Disclaimer:


This electronic medical record was generated, in whole or in part, using a voice

 recognition dictation system.


 (TAYLOR RONDON)





Departure


Departure:


Impression:  


   Primary Impression:  


   COPD exacerbation


   Additional Impression:  


   Person under investigation for COVID-19


Disposition:  09 ADMITTED AS INPATIENT


Admitting Physician:  Yogesh Jordan


 (TAYLOR RONDON)


Condition:  STABLE


Referrals:  


PB SAENZ MD (PCP)





Karo Disclaimer


This chart was dictated in whole or in part using Voice Recognition software in 

a busy, high-work load, and often noisy Emergency Department environment.  It 

may contain unintended and wholly unrecognized errors or omissions.


 (JESÚS WARD MD)





Attending Signature


Attending Signature


I have participated in the care of this patient and I have reviewed and agree 

with all pertinent clinical information above including history, exam, and 

recommendations.





 (JESÚS WARD MD)











TAYLOR RONDON               Mar 13, 2022 20:00


JESÚS WARD MD           Mar 13, 2022 23:59

## 2022-03-13 NOTE — NUR
COVID-19 PCR test canceled by lab due to pt having recent infection in the last 90 days. Pt 
reports she was Covid+ at the end of January this year. Rapid antigen swab was obtained in 
ER and resulted negative. Pt not requiring isolation.

## 2022-03-13 NOTE — NUR
The patient, PARISH RICO, 70 y/o, F admitted by ROSA HAYNES MD, was given written 
information regarding hospital policies, unit procedures and contact persons.  



Valuables were checked and logged. Call light at bedside.

## 2022-03-14 VITALS — DIASTOLIC BLOOD PRESSURE: 72 MMHG | SYSTOLIC BLOOD PRESSURE: 114 MMHG

## 2022-03-14 VITALS — SYSTOLIC BLOOD PRESSURE: 127 MMHG | DIASTOLIC BLOOD PRESSURE: 70 MMHG

## 2022-03-14 VITALS — SYSTOLIC BLOOD PRESSURE: 107 MMHG | DIASTOLIC BLOOD PRESSURE: 70 MMHG

## 2022-03-14 VITALS — SYSTOLIC BLOOD PRESSURE: 117 MMHG | DIASTOLIC BLOOD PRESSURE: 72 MMHG

## 2022-03-14 VITALS — DIASTOLIC BLOOD PRESSURE: 72 MMHG | SYSTOLIC BLOOD PRESSURE: 116 MMHG

## 2022-03-14 RX ADMIN — ASPIRIN 81 MG SCH MG: 81 TABLET ORAL at 08:39

## 2022-03-14 RX ADMIN — BENZONATATE PRN MG: 100 CAPSULE, LIQUID FILLED ORAL at 21:14

## 2022-03-14 RX ADMIN — METOPROLOL TARTRATE SCH MG: 25 TABLET, FILM COATED ORAL at 08:41

## 2022-03-14 RX ADMIN — IPRATROPIUM BROMIDE AND ALBUTEROL SULFATE SCH ML: .5; 3 SOLUTION RESPIRATORY (INHALATION) at 19:43

## 2022-03-14 RX ADMIN — IPRATROPIUM BROMIDE AND ALBUTEROL SULFATE SCH ML: .5; 3 SOLUTION RESPIRATORY (INHALATION) at 11:50

## 2022-03-14 RX ADMIN — ATORVASTATIN CALCIUM SCH MG: 20 TABLET, FILM COATED ORAL at 08:39

## 2022-03-14 RX ADMIN — MONTELUKAST SODIUM SCH MG: 10 TABLET ORAL at 08:39

## 2022-03-14 RX ADMIN — BUDESONIDE SCH MG: 0.5 SUSPENSION RESPIRATORY (INHALATION) at 08:30

## 2022-03-14 RX ADMIN — BUDESONIDE SCH MG: 0.5 SUSPENSION RESPIRATORY (INHALATION) at 09:24

## 2022-03-14 RX ADMIN — BENZONATATE PRN MG: 100 CAPSULE, LIQUID FILLED ORAL at 12:20

## 2022-03-14 RX ADMIN — BUDESONIDE SCH MG: 0.5 SUSPENSION RESPIRATORY (INHALATION) at 20:22

## 2022-03-14 RX ADMIN — DOXYCYCLINE HYCLATE SCH MG: 100 TABLET, COATED ORAL at 20:23

## 2022-03-14 RX ADMIN — PANTOPRAZOLE SODIUM SCH MG: 40 TABLET, DELAYED RELEASE ORAL at 08:39

## 2022-03-14 RX ADMIN — IPRATROPIUM BROMIDE AND ALBUTEROL SULFATE SCH ML: .5; 3 SOLUTION RESPIRATORY (INHALATION) at 16:45

## 2022-03-14 RX ADMIN — IPRATROPIUM BROMIDE AND ALBUTEROL SULFATE SCH ML: .5; 3 SOLUTION RESPIRATORY (INHALATION) at 05:20

## 2022-03-14 RX ADMIN — METOPROLOL TARTRATE SCH MG: 25 TABLET, FILM COATED ORAL at 20:25

## 2022-03-14 RX ADMIN — FLUTICASONE PROPIONATE SCH SPRAY: 50 SPRAY, METERED NASAL at 08:39

## 2022-03-14 NOTE — PN
DATE: 03/14/2022

SUBJECTIVE:  The patient is resting, slightly propped up in bed, continues to 

have recurrent bouts of dry hacking cough with scanty sputum.  Continues to have

marked chest tightness and wheezing.  She is using accessory muscles.  When I 

examined her this afternoon, she was definitely tachypneic, tachycardic.  

Surprisingly, she is not hypoxic and wheezing can be heard with naked ears, 

without any stethoscope.



PHYSICAL EXAMINATION:

GENERAL:  When I examined her, there was no pallor, jaundice, cyanosis.  No 

lymphadenopathy, no thyromegaly, no jugular venous distention.  No lower limb 

edema.

VITAL SIGNS:  Her heart rate was 111, blood pressure was 133/82, temperature 98,

respiratory rate was 24 and oxygen saturation was 93% on room air.

HEAD, EYES, EARS, NOSE, AND THROAT:  Normocephalic, atraumatic.

NECK:  Supple.

HEART:  Showed normal first and second heart sounds.  No gallop or murmur.

CHEST:  Shows central trachea, equally reduced expansion, reduced air entry, 

vesicular breath sounds with bilateral scattered rhonchi.  I could not 

appreciate any crepitation.

ABDOMEN:  Distended, soft, nontender.

NEUROLOGIC:  She was grossly intact.



Intake and output are incompletely recorded.



LABORATORY DATA:  She has no lab work done this morning.



ASSESSMENT:

1.  Acute chronic obstructive pulmonary disease exacerbation.

2.  Hypertension.

3.  Hyperlipidemia.

4.  Gastroesophageal reflux disease.

5.  Coronary artery disease, status post percutaneous coronary intervention x3.

6.  Degenerative joint disease.

7.  Degenerative disc disease.



PLAN:  To continue with IV Solu-Medrol.  Continue with nebulized albuterol and 

Atrovent.  Continue with Singulair and Mucinex.  I added doxycycline and 

discontinued her guaifenesin/codeine.







AMM/PRM

DR: AMM/greg   DD: 03/14/2022 14:30

DT: 03/14/2022 20:45   TID: [id number]

## 2022-03-14 NOTE — HP
DATE OF SERVICE: 2022

ADMIT DATE: 2022

HISTORY OF PRESENT ILLNESS:  The patient is a 71-year-old  female 

patient who presented to the Emergency Room with a complaint of shortness of 

breath, cough with scanty greenish sputum that has been going on for almost a 

week now.  Denied any chest pain.  Denied any chills, rigors or fever.  Her 

symptoms have been steadily worsening over the last week, not getting any better

and therefore, the patient presented to the Emergency Room. She has been using 

her inhaler as prescribed at home with little relief.  She was extensively 

evaluated in the Emergency Room and has had lab work and imaging studies.  Her 

lab work was mostly unremarkable, in fact her D-dimer was only 0.42.  Her 

coronavirus rapid antigen testing was negative.  Her influenza A and B were 

negative.  Her chest x-ray showed that there is a chronic-appearing interstitial

thickening, similar to the previous exam, with no definite acute infiltrate, 

heart size is normal.  There is no effusion or pneumothorax.  The mediastinum 

and haley appears unremarkable.  The patient was admitted with acute COPD 

exacerbation.  She was treated with Solu-Medrol as well as albuterol and 

Atrovent was admitted for further evaluation and treatment.



PAST MEDICAL HISTORY:  Significant for chronic obstructive pulmonary disease, 

gastroesophageal reflux disease, hypertension, hyperlipidemia, coronary artery 

disease status post PCI with stent deployment x 3 in 2016.  She has degenerative

joint disease and degenerative disk disease.



PAST SURGICAL HISTORY:  Significant for right ulnar and radial fracture, status 

post open reduction internal fixation; angioplasty with stent deployment x 3; 

bilateral total knee arthroplasty; left wrist fracture, status post open 

reduction internal fixation; tonsillectomy and tubal ligation.



ALLERGIES:  SHE IS ALLERGIC TO ADHESIVE TAPES, CIMETIDINE, VENOM HONEY BEE AND 

WASP.



FAMILY HISTORY:  Her father  at age of 78 because of brain tumor and 

cerebral aneurysm.  Mother  at age of 91 with metastatic breast cancer.



SOCIAL HISTORY:  She is , has 2 daughters.  She quit smoking 25 years 

ago.  She drinks 1-2 beers on the weekends.  Does not use any drugs.  She is 

retired.  She used to be a demo worker.



MEDICATIONS:  She is currently on the following medications:  She is on 

diphenhydramine 25 mg once a day, ipratropium bromide, albuterol sulfate by 

nebulizer 4 times a day, albuterol sulfate 2 puffs every 4-6 hours, rosuvastatin

for Crestor 40 mg once a day, metoprolol tartrate 12.5 mg twice a day, aspirin 

81 mg once a day, ibuprofen 600 mg 3 times a day.  She is on hydrocodone/APAP 

5/325 one tablet once a day as needed for pain, guaifenesin/codeine phosphate 5 

mL 3 times a day, fluticasone for Trelegy Ellipta 1 inhalation once a day, 

montelukast 10 mg daily.  She is on Flonase 2 sprays to each nostril once a day,

Colace 100 mg twice a day, Protonix 40 mg once a day.



REVIEW OF SYSTEMS:  As per history of present illness.



PHYSICAL EXAMINATION:

GENERAL:  On arrival to the Emergency Room, the patient was clearly tachypneic, 

tachycardic; however, there was no pallor, jaundice, cyanosis.  No 

lymphadenopathy, no thyromegaly, no jugular venous distention.  No lower limb 

edema.

VITAL SIGNS:  Her heart rate on admission was 117, blood pressure was 150/109.  

Her temperature was 98, respiratory rate was 32 and oxygen saturation was 92% on

room air.

HEAD, EYES, EARS, NOSE, AND THROAT:  Normocephalic, atraumatic.

NECK:  Supple.

HEART:  Showed normal first and second heart sounds.  No gallop or murmur.

CHEST:  Shows central trachea, equally reduced expansion, reduced air entry, 

vesicular breath sounds with bilateral diffuse rhonchi.  I could not appreciate 

any crepitation.

ABDOMEN:  Distended, soft, nontender.

NEUROLOGIC:  She was grossly intact.



LABORATORY DATA:  On admission showed a white cell count 7500, hemoglobin 15, 

hematocrit 47, MCV 91, and platelet count 237,000 with normal manual 

differential.  Her chemistry showed a serum sodium 141, potassium 3.8, chloride 

104, bicarbonate 27, anion gap of 10, BUN 6, creatinine 0.8.  Estimated GFR was 

71 mL per minute.  Her glucose was 94.  Lactic acid was 1.6.  Calcium was 8.8. 

Her total bilirubin, AST, ALT, alkaline phosphatase were normal.  Total protein 

7, albumin 3.8.  Troponin I high sensitivity was only 12.  Her chest x-ray 

showed that the patient has chronic-appearing interstitial thickening, similar 

to the previous exam, with no definite acute infiltrate, heart size is normal 

and there is no effusion or pneumothorax.  The mediastinum and haley appears 

unremarkable.



ASSESSMENT AND PLAN:  The patient was admitted with acute chronic obstructive 

pulmonary disease exacerbation.  The patient has a multitude of other medical 

problems including:

A. Gastroesophageal reflux disease.

B.  Hypertension.

C.  Hyperlipidemia.

D.  Coronary artery disease status post percutaneous coronary intervention with 

stent deployment.

E.  Degenerative joint disease.

F.  Degenerative disk disease.



We will continue with the IV Solu-Medrol.  Continue with the bronchodilator, 

Singulair.  I will add Mucinex and doxycycline and follow her on a daily basis.







AMM/SAY

DR: AMM/nts   DD: 2022 14:18

DT: 2022 14:31   TID: [id number]

## 2022-03-15 VITALS — SYSTOLIC BLOOD PRESSURE: 136 MMHG | DIASTOLIC BLOOD PRESSURE: 77 MMHG

## 2022-03-15 VITALS — SYSTOLIC BLOOD PRESSURE: 123 MMHG | DIASTOLIC BLOOD PRESSURE: 78 MMHG

## 2022-03-15 LAB
ALBUMIN SERPL-MCNC: 3.6 G/DL (ref 3.4–5)
ALBUMIN/GLOB SERPL: 1.3 {RATIO} (ref 1–1.7)
ALP SERPL-CCNC: 45 U/L (ref 46–116)
ALT SERPL-CCNC: 24 U/L (ref 14–59)
ANION GAP SERPL CALC-SCNC: 10 MMOL/L (ref 6–14)
AST SERPL-CCNC: 15 U/L (ref 15–37)
BILIRUB SERPL-MCNC: 0.4 MG/DL (ref 0.2–1)
BUN/CREAT SERPL: 20 (ref 6–20)
CA-I SERPL ISE-MCNC: 16 MG/DL (ref 7–20)
CALCIUM SERPL-MCNC: 8.5 MG/DL (ref 8.5–10.1)
CHLORIDE SERPL-SCNC: 106 MMOL/L (ref 98–107)
CO2 SERPL-SCNC: 25 MMOL/L (ref 21–32)
CREAT SERPL-MCNC: 0.8 MG/DL (ref 0.6–1)
ERYTHROCYTE [DISTWIDTH] IN BLOOD BY AUTOMATED COUNT: 14.7 % (ref 11.5–14.5)
GFR SERPLBLD BASED ON 1.73 SQ M-ARVRAT: 71.1 ML/MIN
GLOBULIN SER-MCNC: 2.8 G/DL (ref 2.2–3.8)
GLUCOSE SERPL-MCNC: 131 MG/DL (ref 70–99)
HCT VFR BLD CALC: 44.5 % (ref 36–47)
HGB BLD-MCNC: 14.4 G/DL (ref 12–15.5)
MCH RBC QN AUTO: 30 PG (ref 25–35)
MCHC RBC AUTO-ENTMCNC: 32 G/DL (ref 31–37)
MCV RBC AUTO: 91 FL (ref 79–100)
PLATELET # BLD AUTO: 231 X10^3/UL (ref 140–400)
POTASSIUM SERPL-SCNC: 4.4 MMOL/L (ref 3.5–5.1)
PROT SERPL-MCNC: 6.4 G/DL (ref 6.4–8.2)
RBC # BLD AUTO: 4.87 X10^6/UL (ref 3.5–5.4)
SODIUM SERPL-SCNC: 141 MMOL/L (ref 136–145)
WBC # BLD AUTO: 11.7 X10^3/UL (ref 4–11)

## 2022-03-15 RX ADMIN — ATORVASTATIN CALCIUM SCH MG: 20 TABLET, FILM COATED ORAL at 07:46

## 2022-03-15 RX ADMIN — BENZONATATE PRN MG: 100 CAPSULE, LIQUID FILLED ORAL at 07:46

## 2022-03-15 RX ADMIN — ASPIRIN 81 MG SCH MG: 81 TABLET ORAL at 07:46

## 2022-03-15 RX ADMIN — IPRATROPIUM BROMIDE AND ALBUTEROL SULFATE SCH ML: .5; 3 SOLUTION RESPIRATORY (INHALATION) at 12:35

## 2022-03-15 RX ADMIN — METOPROLOL TARTRATE SCH MG: 25 TABLET, FILM COATED ORAL at 07:47

## 2022-03-15 RX ADMIN — DOXYCYCLINE HYCLATE SCH MG: 100 TABLET, COATED ORAL at 07:46

## 2022-03-15 RX ADMIN — MONTELUKAST SODIUM SCH MG: 10 TABLET ORAL at 07:46

## 2022-03-15 RX ADMIN — BUDESONIDE SCH MG: 0.5 SUSPENSION RESPIRATORY (INHALATION) at 08:30

## 2022-03-15 RX ADMIN — FLUTICASONE PROPIONATE SCH SPRAY: 50 SPRAY, METERED NASAL at 09:00

## 2022-03-15 RX ADMIN — PANTOPRAZOLE SODIUM SCH MG: 40 TABLET, DELAYED RELEASE ORAL at 07:45

## 2022-03-15 RX ADMIN — IPRATROPIUM BROMIDE AND ALBUTEROL SULFATE SCH ML: .5; 3 SOLUTION RESPIRATORY (INHALATION) at 05:20

## 2022-03-15 NOTE — NUR
Pt. rested quietly between cares throughout NOC. Cont. to get dyspneic et frequent cough 
when gets up to BR. No changes from previous assessment.

## 2022-03-15 NOTE — NUR
discharge note 

PT discharged at 1343 via ambulation accompanied by security . pt given written and verbal 
instructions with verbal statement of understanding received.

## 2022-04-11 NOTE — DS
DATE OF DISCHARGE:  07/05/2018



HOSPITAL COURSE:  The patient was admitted with multiple complaints including

congestion, hives, urticaria, pruritus, nausea, dyspnea, fever, malaise,

myalgia, fatigue.  She follows with Dr. Sherwood, primary care physician.  She was

admitted recently to [facility] on 06/20/2018.  She has completed Medrol

Dosepak 4 days ago and started having nonproductive cough.  Denied any history

of venous suppression.  She was evaluated in the Emergency Room and was admitted

with upper respiratory tract infection, asthma, COPD exacerbation, allergic

reaction, UTI, left bundle branch block, chronic pain syndrome, elevated

D-dimer.  Given she has elevated D-dimer, she was started on Lovenox.  She

underwent CT angio of the chest, which was negative for pulmonary emboli. 

Bilateral Doppler ultrasound was negative for lower extremity deep vein

thrombosis.  She did actually very well and has had no further episode of skin

rash, no cough, and generally stated that she is back to her baseline and would

like to go home.



PHYSICAL EXAMINATION:

GENERAL:  When I saw her today, she looked well and was clearly in no apparent

respiratory distress, pale, but no jaundice, cyanosis, or thyromegaly.  No

jugular venous distention.  No limb edema.

VITAL SIGNS:  Her heart rate was 79, blood pressure was 170/68, temperature was

97.3, respiratory rate was 20, and oxygen saturation was 98% on room air.

HEAD, EYES, EARS, NOSE AND THROAT:  Showed normocephalic, atraumatic.

NECK:  Supple.

HEART:  Showed normal first and second heart sounds with no gallop, rub, or

murmur.

CHEST:  Clear to auscultation.  No crepitation or rhonchi.

ABDOMEN:  Distended, soft, nontender.  No guarding or rigidity.  No

organomegaly.  All hernia orifices intact.  Bowel sounds normal.

NEUROLOGIC:  She was awake, alert, responding appropriately.  All her cranial

nerves are intact.  She moves extremities without difficulty.  She was actually

able to walk with her cane steadily without any problem.



Her intake over the last 24 hours was 3400 and no output was recorded.



LABORATORY DATA:  Her lab work showed a white cell count of 14,600, hemoglobin

13, hematocrit 41, MCV 89, and platelet count of 259,000.  Her prothrombin time

was 9.6, INR was 0.9, aPTT was 24, and D-dimer was 0.83.  Her chemistry today

showed a serum sodium 142, potassium 3.9, chloride 107, bicarbonate 27, anion

gap of 8, BUN 9, creatinine 0.7, estimated GFR was 84 mL per minute.  Her

glucose was 137, calcium was 8.5.  Total bilirubin, AST, ALT, alkaline

phosphatase were normal.  Total protein was 6.2, albumin was 3.1.  Her TSH was

2.436.  C-reactive protein was 2.1.  Her sedimentation rate was only 5 mm per

hour.  Urinalysis was unremarkable and tox screen was positive only for opiates.



DISCHARGE MEDICATIONS:  She was discharged home to continue on cefpodoxime 200

mg twice a day for 5 more days, albuterol sulfate 2 puffs every 4 hours, aspirin

81 mg once a day, atorvastatin 20 mg at bedtime, Plavix 75 mg once a day,

cyproheptadine 4 mg 3 times a day, diphenhydramine 25 mg every 6 hours as needed

for itching, Flonase 2 sprays to each nostril daily, fluticasone/Advair 250/50

one puff twice a day, hydrocodone/APAP 5/325 one to two tablets twice a day,

lorazepam 0.5 mg once a day, metoprolol tartrate 12.5 mg twice a day,

nitroglycerin 0.4 mg sublingually for 5 minutes x 3, olopatadine for Patanol 1

drop to each eye twice a day, and Protonix 40 mg once a day.



FINAL DISCHARGE DIAGNOSES:  Asthma, chronic obstructive pulmonary disease

exacerbation, allergic reaction, urinary tract infection, chronic pain syndrome,

elevated D-dimer with negative CT angiogram of the chest as well as bilateral

Doppler ultrasound.





______________________________

ROSA HAYNES MD



DR:  FLORY/greg  JOB#:  [id number] / [id number]

DD:  07/05/2018 11:54  DT:  07/05/2018 13:37 [Dear  ___] : Dear  [unfilled], [Courtesy Letter:] : I had the pleasure of seeing your patient, [unfilled], in my office today. [Please see my note below.] : Please see my note below. [Sincerely,] : Sincerely, [FreeTextEntry2] : Cammy Valle MD\par [address] [place]\par [facility]   [id number]\par  [FreeTextEntry3] : Robert Bolton MD FAAP FACS\par Director, [facility]\par Division of Pediatric General, Thoracic and Endoscopic Surgery\par [facility]\par  Double O-Z Plasty Text: The defect edges were debeveled with a #15 scalpel blade.  Given the location of the defect, shape of the defect and the proximity to free margins a Double O-Z plasty (double transposition flap) was deemed most appropriate.  Using a sterile surgical marker, the appropriate transposition flaps were drawn incorporating the defect and placing the expected incisions within the relaxed skin tension lines where possible. The area thus outlined was incised deep to adipose tissue with a #15 scalpel blade.  The skin margins were undermined to an appropriate distance in all directions utilizing iris scissors.  Hemostasis was achieved with electrocautery.  The flaps were then transposed into place, one clockwise and the other counterclockwise, and anchored with interrupted buried subcutaneous sutures.

## 2022-05-01 ENCOUNTER — HOSPITAL ENCOUNTER (EMERGENCY)
Dept: HOSPITAL 63 - ER | Age: 72
Discharge: ANOTHER HOSPITAL | End: 2022-05-01
Payer: MEDICARE

## 2022-05-01 VITALS — SYSTOLIC BLOOD PRESSURE: 119 MMHG | DIASTOLIC BLOOD PRESSURE: 61 MMHG

## 2022-05-01 VITALS — HEIGHT: 60 IN | WEIGHT: 179.46 LBS | BODY MASS INDEX: 35.23 KG/M2

## 2022-05-01 DIAGNOSIS — Z87.891: ICD-10-CM

## 2022-05-01 DIAGNOSIS — I44.7: ICD-10-CM

## 2022-05-01 DIAGNOSIS — J44.9: ICD-10-CM

## 2022-05-01 DIAGNOSIS — Z88.8: ICD-10-CM

## 2022-05-01 DIAGNOSIS — K21.9: ICD-10-CM

## 2022-05-01 DIAGNOSIS — I10: ICD-10-CM

## 2022-05-01 DIAGNOSIS — F41.9: ICD-10-CM

## 2022-05-01 DIAGNOSIS — Z91.030: ICD-10-CM

## 2022-05-01 DIAGNOSIS — I48.20: Primary | ICD-10-CM

## 2022-05-01 LAB
ALBUMIN SERPL-MCNC: 3.8 G/DL (ref 3.4–5)
ALBUMIN/GLOB SERPL: 1 {RATIO} (ref 1–1.7)
ALP SERPL-CCNC: 59 U/L (ref 46–116)
ALT SERPL-CCNC: 31 U/L (ref 14–59)
AMPHETAMINE/METHAMPHETAMINE: (no result)
ANION GAP SERPL CALC-SCNC: 9 MMOL/L (ref 6–14)
APTT PPP: YELLOW S
AST SERPL-CCNC: 21 U/L (ref 15–37)
BACTERIA #/AREA URNS HPF: 0 /HPF
BARBITURATES UR-MCNC: (no result) UG/ML
BASOPHILS # BLD AUTO: 0.1 X10^3/UL (ref 0–0.2)
BASOPHILS NFR BLD: 1 % (ref 0–3)
BENZODIAZ UR-MCNC: (no result) UG/L
BILIRUB SERPL-MCNC: 0.4 MG/DL (ref 0.2–1)
BUN/CREAT SERPL: 16 (ref 6–20)
CA-I SERPL ISE-MCNC: 13 MG/DL (ref 7–20)
CALCIUM SERPL-MCNC: 9.3 MG/DL (ref 8.5–10.1)
CANNABINOIDS UR-MCNC: (no result) UG/L
CHLORIDE SERPL-SCNC: 104 MMOL/L (ref 98–107)
CO2 SERPL-SCNC: 28 MMOL/L (ref 21–32)
COCAINE UR-MCNC: (no result) NG/ML
CREAT SERPL-MCNC: 0.8 MG/DL (ref 0.6–1)
EOSINOPHIL NFR BLD: 0.3 X10^3/UL (ref 0–0.7)
EOSINOPHIL NFR BLD: 3 % (ref 0–3)
ERYTHROCYTE [DISTWIDTH] IN BLOOD BY AUTOMATED COUNT: 14.1 % (ref 11.5–14.5)
FIBRINOGEN PPP-MCNC: CLEAR MG/DL
GFR SERPLBLD BASED ON 1.73 SQ M-ARVRAT: 71.1 ML/MIN
GLOBULIN SER-MCNC: 3.7 G/DL (ref 2.2–3.8)
GLUCOSE SERPL-MCNC: 112 MG/DL (ref 70–99)
GLUCOSE UR STRIP-MCNC: (no result) MG/DL
HCT VFR BLD CALC: 49.7 % (ref 36–47)
HGB BLD-MCNC: 16.3 G/DL (ref 12–15.5)
LYMPHOCYTES # BLD: 2.7 X10^3/UL (ref 1–4.8)
LYMPHOCYTES NFR BLD AUTO: 29 % (ref 24–48)
MAGNESIUM SERPL-MCNC: 2.2 MG/DL (ref 1.8–2.4)
MCH RBC QN AUTO: 30 PG (ref 25–35)
MCHC RBC AUTO-ENTMCNC: 33 G/DL (ref 31–37)
MCV RBC AUTO: 90 FL (ref 79–100)
METHADONE SERPL-MCNC: (no result) NG/ML
MONO #: 0.8 X10^3/UL (ref 0–1.1)
MONOCYTES NFR BLD: 9 % (ref 0–9)
NEUT #: 5.3 X10^3UL (ref 1.8–7.7)
NEUTROPHILS NFR BLD AUTO: 58 % (ref 31–73)
NITRITE UR QL STRIP: (no result)
OPIATES UR-MCNC: (no result) NG/ML
PCP SERPL-MCNC: (no result) MG/DL
PLATELET # BLD AUTO: 268 X10^3/UL (ref 140–400)
POTASSIUM SERPL-SCNC: 3.4 MMOL/L (ref 3.5–5.1)
PROT SERPL-MCNC: 7.5 G/DL (ref 6.4–8.2)
RBC # BLD AUTO: 5.52 X10^6/UL (ref 3.5–5.4)
RBC #/AREA URNS HPF: 0 /HPF (ref 0–2)
SODIUM SERPL-SCNC: 141 MMOL/L (ref 136–145)
SP GR UR STRIP: 1.01
SQUAMOUS #/AREA URNS LPF: (no result) /LPF
UROBILINOGEN UR-MCNC: 0.2 MG/DL
WBC # BLD AUTO: 9.2 X10^3/UL (ref 4–11)
WBC #/AREA URNS HPF: 0 /HPF (ref 0–4)

## 2022-05-01 PROCEDURE — 96365 THER/PROPH/DIAG IV INF INIT: CPT

## 2022-05-01 PROCEDURE — 80307 DRUG TEST PRSMV CHEM ANLYZR: CPT

## 2022-05-01 PROCEDURE — 85025 COMPLETE CBC W/AUTO DIFF WBC: CPT

## 2022-05-01 PROCEDURE — 96361 HYDRATE IV INFUSION ADD-ON: CPT

## 2022-05-01 PROCEDURE — 96375 TX/PRO/DX INJ NEW DRUG ADDON: CPT

## 2022-05-01 PROCEDURE — 93005 ELECTROCARDIOGRAM TRACING: CPT

## 2022-05-01 PROCEDURE — 85730 THROMBOPLASTIN TIME PARTIAL: CPT

## 2022-05-01 PROCEDURE — 99285 EMERGENCY DEPT VISIT HI MDM: CPT

## 2022-05-01 PROCEDURE — 81001 URINALYSIS AUTO W/SCOPE: CPT

## 2022-05-01 PROCEDURE — 71045 X-RAY EXAM CHEST 1 VIEW: CPT

## 2022-05-01 PROCEDURE — 83735 ASSAY OF MAGNESIUM: CPT

## 2022-05-01 PROCEDURE — 85610 PROTHROMBIN TIME: CPT

## 2022-05-01 PROCEDURE — 80053 COMPREHEN METABOLIC PANEL: CPT

## 2022-05-01 PROCEDURE — 36415 COLL VENOUS BLD VENIPUNCTURE: CPT

## 2022-05-01 PROCEDURE — 96374 THER/PROPH/DIAG INJ IV PUSH: CPT

## 2022-05-01 PROCEDURE — 83880 ASSAY OF NATRIURETIC PEPTIDE: CPT

## 2022-05-01 PROCEDURE — 84484 ASSAY OF TROPONIN QUANT: CPT

## 2022-05-01 PROCEDURE — 84443 ASSAY THYROID STIM HORMONE: CPT

## 2022-05-01 PROCEDURE — 96366 THER/PROPH/DIAG IV INF ADDON: CPT

## 2022-05-01 NOTE — EKG
[facility] [address], [place] [address]

Test Date:    2022               Test Time:    17:48:27

Pat Name:     PARISH RICO      Department:   

Patient ID:   [id number]           Room:          

Gender:       F                        Technician:   

:          1950               Requested By: VERONICA SAMPSON

Order Number: [id number]            Reading MD:     

                                 Measurements

Intervals                              Axis          

Rate:         147                      P:            -64

NE:           88                       QRS:          0

QRSD:         122                      T:            156

QT:           312                                    

QTc:          495                                    

                           Interpretive Statements

SINUS TACHYCARDIA

COMPLEX(ES) WITH ABERRANT INTRAVENTRICULAR CONDUCTION

ATRIAL PREMATURE COMPLEX(ES)

LEFTWARD AXIS

INCOMPLETE LEFT BUNDLE BRANCH BLOCK

ST ABNORMALITY, POSSIBLE

HIGH LATERAL SUBENDOCARDIAL INJURY

ABNORMAL ECG

RI6.02

No previous ECG available for comparison

## 2022-05-01 NOTE — RAD
Exam: Chest one view



INDICATION: Dyspnea



TECHNIQUE: Frontal view of the chest



Comparisons: 3/13/2022



FINDINGS:

The cardiomediastinal silhouette and pulmonary vessels are within normal limits.



Strandy opacities at the dependent portion lungs likely representing atelectasis. No pleural effusion
.



IMPRESSION:

Findings which may relate to mild bibasilar atelectasis.



Electronically signed by: Gretchen Lowery MD (5/1/2022 7:24 PM) [facility]

## 2022-05-01 NOTE — PHYS DOC
Past History


Past Medical History:  Anxiety, COPD, GERD, Hypertension


Additional Past Medical Histor:  3 stents, hemorrhoids; left bundle branch block


Past Surgical History:  Other


Additional Past Surgical Histo:  DONALD knee repl; 3 card stents;esoph dil;lt wrist

repair; rt rad/uln open red


Smoking:  Quit Greater Than 1 Year


Alcohol Use:  Occasionally


Drug Use:  None





General Adult


EDM:


Chief Complaint:  CHEST PAIN





HPI:


HPI:





Patient is a 71 year old female who presents with dizziness, mild dyspnea and 

palpitations.  She reports that symptoms began just shortly prior to arrival, 

while urinating.  No previous similar symptoms.  She denies chest pain.  She 

denies syncope, near syncope, headache, numbness, tingling, motor weakness, n/v,

abdominal pain, fever, chills.  She admits to drinking every weekend and drank 

yesterday.  She denies daily alcohol use.  She denies illicit drug use.  She has

a history of coronary artery disease with stents.  She takes aspirin daily. She 

denies previous history of MI.  She reports that she underwent colonoscopy and 

polyp removal last week, and this was reportedly uneventful.  The patient has a 

history of known LBBB.





Review of Systems:


Review of Systems:


Constitutional:  Denies fever or chills 


Eyes:  Denies change in visual acuity 


HENT:  Denies nasal congestion or sore throat 


Respiratory:  Denies cough or hemoptysis.  Reports shortness of breath.


Cardiovascular:  Denies chest pain or edema.  Reports palpitations and feeling 

of heart racing.


GI:  Denies abdominal pain, nausea, vomiting, bloody stools 


: Denies dysuria, reports urinary urgency and frequency 


Musculoskeletal:  Denies back pain or joint pain 


Integument:  Denies rash 


Neurologic:  Denies headache, focal weakness or sensory changes. Reports 

dizziness/lightheadedness.  Denies syncope or near syncope.  Denies fall or head

injury.


Psychiatric:  Denies depression or anxiety





Current Medications:


Current Meds:





Current Medications








 Medications


  (Trade)  Dose


 Ordered  Sig/Mar  Start Time


 Stop Time Status Last Admin


Dose Admin


 


 Diltiazem HCl


  (Cardizem Iv


 Push)  10 mg  1X  ONCE  22 18:30


 22 18:31   





 


 Sodium Chloride  1,000 ml @ 


 1,000 mls/hr  1X  ONCE  22 18:30


 22 19:29   














Allergies:


Allergies:





Allergies








Coded Allergies Type Severity Reaction Last Updated Verified


 


  venom-honey bee Allergy Severe Shortness of Air 22 Yes


 


  adhesive Allergy Intermediate  22 Yes


 


  cimetidine Allergy Intermediate "toxic" 22 Yes


 


  cimetidine HCl Allergy Intermediate "toxic" 22 Yes











Physical Exam:


PE:





Constitutional: Well developed, well nourished, no acute distress, non-toxic 

appearance. She is anxious appearing.


HENT: Normocephalic, atraumatic


Eyes: PERRL, EOMI, conjunctiva normal, no discharge. No scleral icterus.


Neck: Normal range of motion, no tenderness, supple, no stridor. [] 


Cardiovascular: Irregularly irregular, tachycardic, +2 radial and +2 PT pulses 

bilaterally.


Lungs & Thorax:  Bilateral breath sounds clear to auscultation, no R/R/W, no 

stridor, no tachypnea, no distress.


Abdomen: Abdomen is soft, non-distended, normal bowel sounds, mild suprapubic 

tenderness, no guarding, no rebound, no rigidity, no CVA tenderness


Skin: Warm, dry, no erythema, no rash. [] 


Back: No tenderness, no CVA tenderness. [] 


Extremities: No tenderness, no cyanosis, no clubbing, ROM intact, no edema. No 

calf tenderness.


Neurologic: Alert and oriented X 3, normal motor function, normal sensory 

function, no focal deficits noted. []


Psychologic: Mildly anxious, cooperative.





Current Patient Data:


Vital Signs:





                                   Vital Signs








  Date Time  Temp Pulse Resp B/P (MAP) Pulse Ox O2 Delivery O2 Flow Rate FiO2


 


22 17:53 98.1 152 28 121/78 (92) 95 Room Air  











EKG:


EKG:


EKG is interpreted at 1758


Rhythm is irregularly irregular, tachycardia, atrial fibrillation RVR


Rate is 147 bpm


Axis is left


LBBB (old, per patient)


LVH


No STEMI





Radiology/Procedures:


Radiology/Procedures:


                                 IMAGING REPORT





                                     Signed





PATIENT: PARISH RICOOUNT: [id number]     MRN#: [id number]


: 1950           LOCATION: ER              AGE: 71


SEX: F                    EXAM DT: 22         ACCESSION#: [id number]


STATUS: REG ER            ORD. PHYSICIAN: VERONICA SAMPSON DO


REASON: dyspnea


PROCEDURE: PORTABLE CHEST 1V





Exam: Chest one view





INDICATION: Dyspnea





TECHNIQUE: Frontal view of the chest





Comparisons: 3/13/2022





FINDINGS:


The cardiomediastinal silhouette and pulmonary vessels are within normal limits.





Strandy opacities at the dependent portion lungs likely representing at

electasis. No pleural effusion.





IMPRESSION:


Findings which may relate to mild bibasilar atelectasis.





Electronically signed by: Gretchen Hoff MD (2022 7:24 PM) [facility]














DICTATED AND SIGNED BY:     GRETCHEN HOFF MD


DATE:     22





CC: PB SAENZ MD; VERONICA SAMPSON DO ~





Heart Score:


C/O Chest Pain:  No


Risk Factors:


Risk Factors:  DM, Current or recent (<one month) smoker, HTN, HLP, family 

history of CAD, obesity.


Risk Scores:


Score 0 - 3:  2.5% MACE over next 6 weeks - Discharge Home


Score 4 - 6:  20.3% MACE over next 6 weeks - Admit for Clinical Observation


Score 7 - 10:  72.7% MACE over next 6 weeks - Early Invasive Strategies





Course & Med Decision Making:


Course & Med Decision Making


Pertinent Labs and Imaging studies reviewed. (See chart for details)





The patient is given IV Diltiazem bolus of 20 mg.  HR improved, but RVR 

persists.  She is started on a Diltiazem drip and heparin drip. Heart rate is in

 the 90s, blood pressure is stable.  She continues to deny chest pain, dyspnea 

is now resolved, palpitations subjectively resolved.  I have discussed the 

findings, differential diagnosis and plan of care with the patient. She requests

 transfer to [facility], since this is where her cardiologist is located.  She 

was accepted for admission at [facility] on the [address]. Accepting physician is 

Dr. Chavis.  The cardiologist, Dr. Ko, requested placing the patient on oral 

Eliquis and stopping the heparin drip, so this was done.  The patient is 

improved and stable at time of transfer to [facility].





Dragon Disclaimer:


Dragon Disclaimer:


This electronic medical record was generated, in whole or in part, using a voice

 recognition dictation system.





Departure


Departure:


Impression:  


   Primary Impression:  


   Atrial fibrillation with rapid ventricular response


   Additional Impression:  


   Left bundle branch block


Disposition:   SHORT TERM [facility] ([facility] )


Condition:  STABLE


Referrals:  


PB SAENZ MD (PCP)











VERONICA SAMPSON DO              May 1, 2022 18:21

## 2022-05-03 ENCOUNTER — HOSPITAL ENCOUNTER (EMERGENCY)
Dept: HOSPITAL 63 - ER | Age: 72
Discharge: HOME | End: 2022-05-03
Payer: MEDICARE

## 2022-05-03 VITALS — DIASTOLIC BLOOD PRESSURE: 71 MMHG | SYSTOLIC BLOOD PRESSURE: 157 MMHG

## 2022-05-03 VITALS — WEIGHT: 193.12 LBS | HEIGHT: 64 IN | BODY MASS INDEX: 32.97 KG/M2

## 2022-05-03 DIAGNOSIS — Z88.8: ICD-10-CM

## 2022-05-03 DIAGNOSIS — R07.89: Primary | ICD-10-CM

## 2022-05-03 DIAGNOSIS — Z91.030: ICD-10-CM

## 2022-05-03 DIAGNOSIS — Z87.891: ICD-10-CM

## 2022-05-03 DIAGNOSIS — I10: ICD-10-CM

## 2022-05-03 DIAGNOSIS — J44.9: ICD-10-CM

## 2022-05-03 DIAGNOSIS — K64.4: ICD-10-CM

## 2022-05-03 DIAGNOSIS — K21.9: ICD-10-CM

## 2022-05-03 LAB
ALBUMIN SERPL-MCNC: 3.6 G/DL (ref 3.4–5)
ALBUMIN/GLOB SERPL: 1.2 {RATIO} (ref 1–1.7)
ALP SERPL-CCNC: 49 U/L (ref 46–116)
ALT SERPL-CCNC: 27 U/L (ref 14–59)
ANION GAP SERPL CALC-SCNC: 8 MMOL/L (ref 6–14)
APTT PPP: YELLOW S
AST SERPL-CCNC: 17 U/L (ref 15–37)
BACTERIA #/AREA URNS HPF: 0 /HPF
BASOPHILS # BLD AUTO: 0.1 X10^3/UL (ref 0–0.2)
BASOPHILS NFR BLD: 1 % (ref 0–3)
BILIRUB SERPL-MCNC: 0.4 MG/DL (ref 0.2–1)
BUN/CREAT SERPL: 19 (ref 6–20)
CA-I SERPL ISE-MCNC: 13 MG/DL (ref 7–20)
CALCIUM SERPL-MCNC: 9.2 MG/DL (ref 8.5–10.1)
CHLORIDE SERPL-SCNC: 105 MMOL/L (ref 98–107)
CO2 SERPL-SCNC: 26 MMOL/L (ref 21–32)
CREAT SERPL-MCNC: 0.7 MG/DL (ref 0.6–1)
EOSINOPHIL NFR BLD: 0.3 X10^3/UL (ref 0–0.7)
EOSINOPHIL NFR BLD: 5 % (ref 0–3)
ERYTHROCYTE [DISTWIDTH] IN BLOOD BY AUTOMATED COUNT: 14.3 % (ref 11.5–14.5)
FIBRINOGEN PPP-MCNC: CLEAR MG/DL
GFR SERPLBLD BASED ON 1.73 SQ M-ARVRAT: 83 ML/MIN
GLOBULIN SER-MCNC: 3 G/DL (ref 2.2–3.8)
GLUCOSE SERPL-MCNC: 89 MG/DL (ref 70–99)
GLUCOSE UR STRIP-MCNC: (no result) MG/DL
HCT VFR BLD CALC: 45.7 % (ref 36–47)
HGB BLD-MCNC: 15.1 G/DL (ref 12–15.5)
LYMPHOCYTES # BLD: 2.5 X10^3/UL (ref 1–4.8)
LYMPHOCYTES NFR BLD AUTO: 36 % (ref 24–48)
MAGNESIUM SERPL-MCNC: 2.2 MG/DL (ref 1.8–2.4)
MCH RBC QN AUTO: 30 PG (ref 25–35)
MCHC RBC AUTO-ENTMCNC: 33 G/DL (ref 31–37)
MCV RBC AUTO: 90 FL (ref 79–100)
MONO #: 0.6 X10^3/UL (ref 0–1.1)
MONOCYTES NFR BLD: 8 % (ref 0–9)
NEUT #: 3.6 X10^3UL (ref 1.8–7.7)
NEUTROPHILS NFR BLD AUTO: 51 % (ref 31–73)
NITRITE UR QL STRIP: (no result)
PLATELET # BLD AUTO: 253 X10^3/UL (ref 140–400)
POTASSIUM SERPL-SCNC: 4.3 MMOL/L (ref 3.5–5.1)
PROT SERPL-MCNC: 6.6 G/DL (ref 6.4–8.2)
RBC # BLD AUTO: 5.06 X10^6/UL (ref 3.5–5.4)
RBC #/AREA URNS HPF: (no result) /HPF (ref 0–2)
SODIUM SERPL-SCNC: 139 MMOL/L (ref 136–145)
SP GR UR STRIP: 1.01
SQUAMOUS #/AREA URNS LPF: (no result) /LPF
UROBILINOGEN UR-MCNC: 0.2 MG/DL
WBC # BLD AUTO: 7 X10^3/UL (ref 4–11)
WBC #/AREA URNS HPF: (no result) /HPF (ref 0–4)

## 2022-05-03 PROCEDURE — 83735 ASSAY OF MAGNESIUM: CPT

## 2022-05-03 PROCEDURE — 81001 URINALYSIS AUTO W/SCOPE: CPT

## 2022-05-03 PROCEDURE — 85610 PROTHROMBIN TIME: CPT

## 2022-05-03 PROCEDURE — 85730 THROMBOPLASTIN TIME PARTIAL: CPT

## 2022-05-03 PROCEDURE — 99284 EMERGENCY DEPT VISIT MOD MDM: CPT

## 2022-05-03 PROCEDURE — 85025 COMPLETE CBC W/AUTO DIFF WBC: CPT

## 2022-05-03 PROCEDURE — 85379 FIBRIN DEGRADATION QUANT: CPT

## 2022-05-03 PROCEDURE — 36415 COLL VENOUS BLD VENIPUNCTURE: CPT

## 2022-05-03 PROCEDURE — 93005 ELECTROCARDIOGRAM TRACING: CPT

## 2022-05-03 PROCEDURE — 80053 COMPREHEN METABOLIC PANEL: CPT

## 2022-05-03 PROCEDURE — 84484 ASSAY OF TROPONIN QUANT: CPT

## 2022-05-03 RX ADMIN — HYDROCORTISONE ACETATE PRN MG: 25 SUPPOSITORY RECTAL at 06:19

## 2022-05-03 NOTE — PHYS DOC
Past History


Past Medical History:  Anxiety, COPD, GERD, Hypertension


Additional Past Medical Histor:  3 stents, hemorrhoids; left bundle branch block


Past Surgical History:  Other


Additional Past Surgical Histo:  DONALD knee repl; 3 card stents;esoph dil;lt wrist

repair; rt rad/uln open red


Smoking:  Quit Greater Than 1 Year


Alcohol Use:  Occasionally


Drug Use:  None





Adult General


HPI


HPI


Patient is a 71-year-old female with a past medical history significant for A. 

fib on Eliquis who presents with chest pain, 5 out of 10, dull and achy in 

nature which started about 4 5 hours ago but has since resolved in the ED.  

Denies any recent traumas, travels, illness, fevers, shortness of breath, 

abdominal pain, nausea, vomiting, diarrhea.  Denies any dysuria, hematuria or 

blood in the stool.  States she does have a hemorrhoid that she is had for quite

some time and it was bleeding a little bit yesterday and was wondering about 

treatment for that.  Denies any alcohol or drug use.  Denies any 

numbness/weakness/tingling.  States he is eating and drinking normally for her. 

States he is making urine and stool normally for her.  Denies any falls





Review of Systems


Review of Systems


Review of systems otherwise unremarkable except noted in HPI





Allergies


Allergies





Allergies








Coded Allergies Type Severity Reaction Last Updated Verified


 


  venom-honey bee Allergy Severe Shortness of Air 1/18/22 Yes


 


  adhesive Allergy Intermediate  1/18/22 Yes


 


  cimetidine Allergy Intermediate "toxic" 1/18/22 Yes


 


  cimetidine HCl Allergy Intermediate "toxic" 1/18/22 Yes











Physical Exam


Physical Exam





Constitutional: Well developed, well nourished, no acute distress, non-toxic 

appearance. []


HENT: Normocephalic, atraumatic, bilateral external ears normal, oropharynx 

moist, no oral exudates, nose normal. []


Eyes: conjunctiva normal, no discharge. [] 


Neck: Normal range of motion, no tenderness, supple, no stridor. [] 


Cardiovascular:Heart rate regular rhythm, no murmur []


Lungs & Thorax:  Bilateral breath sounds clear to auscultation []


Abdomen: soft, no tenderness, no masses, no pulsatile masses. 


: Tiny external hemorrhoid, nonthrombosed or bleeding [] 


Skin: Warm, dry, no erythema, no rash. [] 


Back: No tenderness, no CVA tenderness. [] 


Extremities: No tenderness, no cyanosis, no clubbing, ROM intact, no edema. [] 


Neurologic: Alert and oriented X 3, normal motor function, normal sensory 

function, able to sit, stand and walk without issue, no focal deficits noted. []


Psychologic: Affect normal, judgement normal, mood normal. []





EKG


EKG


[]





Radiology/Procedures


Radiology/Procedures


[]





Heart Score


C/O Chest Pain:  Yes


HEART Score for Chest Pain:  








HEART Score for Chest Pain Response (Comments) Value


 


History Slighlty/Non-Suspicious 0


 


ECG Nonspecific Repolarizatio 1


 


Age > 65 2


 


Risk Factors                            1 or 2 Risk Factors 1


 


Troponin < Normal Limit 0


 


Total  4








Risk Factors:


Risk Factors:  DM, Current or recent (<one month) smoker, HTN, HLP, family 

history of CAD, obesity.


Risk Scores:


Risk Factors:  DM, Current or recent (<one month) smoker, HTN, HLP, family 

history of CAD, obesity.





Course & Med Decision Making


Course & Med Decision Making


Patient is a 71-year-old female who presents to the emergency department with a 

chief complaint of chest pain


Vital signs notable for borderline bradycardia and mild hypertension.  Physical 

exam noted above.  EKG with a rate of 56, QRS of 130, QTc 445, no STEMI, left 

bundle branch morphology.  Troponin not concerning.  Low risk Wells.  D-dimer 

negative.


Rest and laboratory analysis not concerning.  Urinalysis not concerning.  

Patient asymptomatic in the ED, alert and oriented no acute distress and able to

 take p.o.  Discussed all findings with patient and offered further observation 

and repeat troponins and EKGs.


Patient states she was feeling well and was ready to be discharged home and will

 follow up in the morning with her primary care physician.  Gave strict return 

precautions to the ED.  Patient grateful, verbalized understanding and agreed 

with plan of discharge.





Dragon Disclaimer


Dragon Disclaimer


This electronic medical record was generated, in whole or in part, using a voice

 recognition dictation system.





Departure


Departure:


Impression:  


   Primary Impression:  


   Chest pain


Disposition:  01 HOME / SELF CARE / HOMELESS


Condition:  STABLE


Referrals:  


PB SAENZ MD (PCP)


Patient Instructions:  Chest Pain (Nonspecific), Hemorrhoids





Additional Instructions:  


Thank you for coming into the emergency department tonight and allowing us to 

take care of you.  Please read the attached information carefully go over things

 we discussed.  Please take all your medications as prescribed.  You can stop at

 the pharmacy and  sitz bath to help with your hemorrhoids.  You can also

 do the suppositories, like Anusol as we discussed and gave to you here in the 

ED for your hemorrhoids.  Please read the attached information carefully on 

hemorrhoid management at home.  Please call your primary care physician this 

morning as we discussed to discuss your ED visit and set up a follow-up as soon 

as possible.  Please come back with new or concerning symptoms as discussed.











CHOLO CABELLO MD                May 3, 2022 04:23

## 2022-05-22 ENCOUNTER — HOSPITAL ENCOUNTER (EMERGENCY)
Dept: HOSPITAL 63 - ER | Age: 72
Discharge: HOME | End: 2022-05-22
Payer: COMMERCIAL

## 2022-05-22 VITALS — HEIGHT: 64 IN | BODY MASS INDEX: 32.97 KG/M2 | WEIGHT: 193.12 LBS

## 2022-05-22 VITALS — DIASTOLIC BLOOD PRESSURE: 71 MMHG | SYSTOLIC BLOOD PRESSURE: 157 MMHG
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
## 2022-05-22 DIAGNOSIS — I10: ICD-10-CM
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
## 2022-05-22 DIAGNOSIS — J44.9: ICD-10-CM

## 2022-05-22 DIAGNOSIS — S09.90XA: Primary | ICD-10-CM
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
## 2022-05-22 PROCEDURE — 70486 CT MAXILLOFACIAL W/O DYE: CPT

## 2022-05-22 PROCEDURE — 72125 CT NECK SPINE W/O DYE: CPT

## 2022-05-22 PROCEDURE — 70450 CT HEAD/BRAIN W/O DYE: CPT

## 2022-05-22 PROCEDURE — 73090 X-RAY EXAM OF FOREARM: CPT

## 2022-05-22 PROCEDURE — 71045 X-RAY EXAM CHEST 1 VIEW: CPT
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
## 2022-05-23 ENCOUNTER — HOSPITAL ENCOUNTER (EMERGENCY)
Dept: HOSPITAL 63 - ER | Age: 72
Discharge: HOME | End: 2022-05-23
Payer: COMMERCIAL

## 2022-05-23 VITALS — WEIGHT: 184.75 LBS | HEIGHT: 60 IN | BODY MASS INDEX: 36.27 KG/M2

## 2022-05-23 VITALS — DIASTOLIC BLOOD PRESSURE: 88 MMHG | SYSTOLIC BLOOD PRESSURE: 155 MMHG

## 2022-05-23 DIAGNOSIS — J44.9: ICD-10-CM

## 2022-05-23 DIAGNOSIS — S06.0X9A: Primary | ICD-10-CM

## 2022-05-23 DIAGNOSIS — F41.9: ICD-10-CM

## 2022-05-23 DIAGNOSIS — I10: ICD-10-CM

## 2022-05-23 DIAGNOSIS — E78.5: ICD-10-CM

## 2022-05-23 DIAGNOSIS — Z88.8: ICD-10-CM
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
## 2022-05-23 DIAGNOSIS — Z91.030: ICD-10-CM
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
## 2022-05-23 DIAGNOSIS — S20.211A: ICD-10-CM

## 2022-05-23 DIAGNOSIS — Z87.891: ICD-10-CM
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
## 2022-05-23 DIAGNOSIS — I25.10: ICD-10-CM
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
## 2022-05-23 DIAGNOSIS — K21.9: ICD-10-CM

## 2022-05-23 DIAGNOSIS — S60.222A: ICD-10-CM

## 2022-05-23 DIAGNOSIS — S20.01XA: ICD-10-CM

## 2022-05-23 LAB
ALBUMIN SERPL-MCNC: 3.6 G/DL (ref 3.4–5)
ALP SERPL-CCNC: 52 U/L (ref 46–116)
ALT SERPL-CCNC: 34 U/L (ref 14–59)
AMPHETAMINE/METHAMPHETAMINE: (no result)
ANION GAP SERPL CALC-SCNC: 4 MMOL/L (ref 6–14)
APTT PPP: YELLOW S
AST SERPL-CCNC: 30 U/L (ref 15–37)
BACTERIA #/AREA URNS HPF: 0 /HPF
BARBITURATES UR-MCNC: (no result) UG/ML
BASOPHILS # BLD AUTO: 0.1 X10^3/UL (ref 0–0.2)
BASOPHILS NFR BLD: 1 % (ref 0–3)
BENZODIAZ UR-MCNC: (no result) UG/L
BILIRUB DIRECT SERPL-MCNC: 0.1 MG/DL (ref 0–0.2)
BILIRUB SERPL-MCNC: 0.7 MG/DL (ref 0.2–1)
CA-I SERPL ISE-MCNC: 12 MG/DL (ref 7–20)
CALCIUM SERPL-MCNC: 9 MG/DL (ref 8.5–10.1)
CANNABINOIDS UR-MCNC: (no result) UG/L
CHLORIDE SERPL-SCNC: 105 MMOL/L (ref 98–107)
CO2 SERPL-SCNC: 31 MMOL/L (ref 21–32)
COCAINE UR-MCNC: (no result) NG/ML
CREAT SERPL-MCNC: 0.6 MG/DL (ref 0.6–1)
EOSINOPHIL NFR BLD: 0.3 X10^3/UL (ref 0–0.7)
EOSINOPHIL NFR BLD: 5 % (ref 0–3)
ERYTHROCYTE [DISTWIDTH] IN BLOOD BY AUTOMATED COUNT: 14.6 % (ref 11.5–14.5)
FIBRINOGEN PPP-MCNC: CLEAR MG/DL
GFR SERPLBLD BASED ON 1.73 SQ M-ARVRAT: 99.1 ML/MIN
GLUCOSE SERPL-MCNC: 80 MG/DL (ref 70–99)
GLUCOSE UR STRIP-MCNC: (no result) MG/DL
HCT VFR BLD CALC: 45.2 % (ref 36–47)
HGB BLD-MCNC: 14.9 G/DL (ref 12–15.5)
LIPASE: 139 U/L (ref 73–393)
LYMPHOCYTES # BLD: 2.1 X10^3/UL (ref 1–4.8)
LYMPHOCYTES NFR BLD AUTO: 33 % (ref 24–48)
MAGNESIUM SERPL-MCNC: 2.2 MG/DL (ref 1.8–2.4)
MCH RBC QN AUTO: 30 PG (ref 25–35)
MCHC RBC AUTO-ENTMCNC: 33 G/DL (ref 31–37)
MCV RBC AUTO: 91 FL (ref 79–100)
METHADONE SERPL-MCNC: (no result) NG/ML
MONO #: 0.6 X10^3/UL (ref 0–1.1)
MONOCYTES NFR BLD: 9 % (ref 0–9)
NEUT #: 3.3 X10^3UL (ref 1.8–7.7)
NEUTROPHILS NFR BLD AUTO: 52 % (ref 31–73)
NITRITE UR QL STRIP: (no result)
OPIATES UR-MCNC: (no result) NG/ML
PCP SERPL-MCNC: (no result) MG/DL
PLATELET # BLD AUTO: 220 X10^3/UL (ref 140–400)
POTASSIUM SERPL-SCNC: 4.5 MMOL/L (ref 3.5–5.1)
PROT SERPL-MCNC: 6.7 G/DL (ref 6.4–8.2)
RBC # BLD AUTO: 4.99 X10^6/UL (ref 3.5–5.4)
RBC #/AREA URNS HPF: 0 /HPF (ref 0–2)
SODIUM SERPL-SCNC: 140 MMOL/L (ref 136–145)
SP GR UR STRIP: 1.02
SQUAMOUS #/AREA URNS LPF: (no result) /LPF
UROBILINOGEN UR-MCNC: 0.2 MG/DL
WBC # BLD AUTO: 6.3 X10^3/UL (ref 4–11)
WBC #/AREA URNS HPF: 0 /HPF (ref 0–4)

## 2022-05-23 PROCEDURE — 83735 ASSAY OF MAGNESIUM: CPT

## 2022-05-23 PROCEDURE — 84484 ASSAY OF TROPONIN QUANT: CPT

## 2022-05-23 PROCEDURE — 85025 COMPLETE CBC W/AUTO DIFF WBC: CPT

## 2022-05-23 PROCEDURE — 85610 PROTHROMBIN TIME: CPT

## 2022-05-23 PROCEDURE — 82550 ASSAY OF CK (CPK): CPT

## 2022-05-23 PROCEDURE — 99285 EMERGENCY DEPT VISIT HI MDM: CPT

## 2022-05-23 PROCEDURE — 81001 URINALYSIS AUTO W/SCOPE: CPT

## 2022-05-23 PROCEDURE — 93005 ELECTROCARDIOGRAM TRACING: CPT

## 2022-05-23 PROCEDURE — 84443 ASSAY THYROID STIM HORMONE: CPT

## 2022-05-23 PROCEDURE — 36415 COLL VENOUS BLD VENIPUNCTURE: CPT

## 2022-05-23 PROCEDURE — 71250 CT THORAX DX C-: CPT

## 2022-05-23 PROCEDURE — 80307 DRUG TEST PRSMV CHEM ANLYZR: CPT

## 2022-05-23 PROCEDURE — 80076 HEPATIC FUNCTION PANEL: CPT

## 2022-05-23 PROCEDURE — 70450 CT HEAD/BRAIN W/O DYE: CPT

## 2022-05-23 PROCEDURE — 83690 ASSAY OF LIPASE: CPT

## 2022-05-23 PROCEDURE — 80048 BASIC METABOLIC PNL TOTAL CA: CPT

## 2022-05-23 PROCEDURE — 85730 THROMBOPLASTIN TIME PARTIAL: CPT

## 2022-05-23 PROCEDURE — 73130 X-RAY EXAM OF HAND: CPT
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
## 2022-05-24 ENCOUNTER — HOSPITAL ENCOUNTER (EMERGENCY)
Dept: HOSPITAL 63 - ER | Age: 72
Discharge: HOME | End: 2022-05-24
Payer: MEDICARE

## 2022-05-24 VITALS — SYSTOLIC BLOOD PRESSURE: 118 MMHG | DIASTOLIC BLOOD PRESSURE: 70 MMHG

## 2022-05-24 VITALS — HEIGHT: 60 IN | BODY MASS INDEX: 36.27 KG/M2 | WEIGHT: 184.75 LBS

## 2022-05-24 DIAGNOSIS — K21.9: ICD-10-CM

## 2022-05-24 DIAGNOSIS — Z87.891: ICD-10-CM

## 2022-05-24 DIAGNOSIS — I10: ICD-10-CM

## 2022-05-24 DIAGNOSIS — J44.9: ICD-10-CM
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
## 2022-05-24 DIAGNOSIS — I25.10: ICD-10-CM

## 2022-05-24 DIAGNOSIS — F41.9: ICD-10-CM

## 2022-05-24 DIAGNOSIS — S60.022D: Primary | ICD-10-CM

## 2022-05-24 DIAGNOSIS — Z88.8: ICD-10-CM

## 2022-05-24 DIAGNOSIS — Z91.030: ICD-10-CM

## 2022-05-24 PROCEDURE — 99281 EMR DPT VST MAYX REQ PHY/QHP: CPT
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
## 2025-02-18 NOTE — RAD
EXAM: CHEST ONE VIEW.



HISTORY: Dyspnea.



COMPARISON: 11/06/2021.



FINDINGS: A frontal view of the chest is obtained.



There are no confluent infiltrates. There is no pneumothorax or pleural effusion. The heart is not en
larged. There are atherosclerotic calcifications of the aorta.



IMPRESSION: 

1. No confluent infiltrates.



Electronically signed by: GABBY Balderas MD (12/20/2021 6:11 AM) [id number] [Use of Plain Language] : use of plain language [Adequate] : adequate [None] : none [] : I have reviewed management goals with caretaker and provided a copy of care plan